# Patient Record
Sex: MALE | Race: WHITE | NOT HISPANIC OR LATINO | Employment: OTHER | URBAN - NONMETROPOLITAN AREA
[De-identification: names, ages, dates, MRNs, and addresses within clinical notes are randomized per-mention and may not be internally consistent; named-entity substitution may affect disease eponyms.]

---

## 2017-11-30 ENCOUNTER — OFFICE VISIT (OUTPATIENT)
Dept: CARDIOLOGY | Facility: CLINIC | Age: 57
End: 2017-11-30

## 2017-11-30 VITALS
OXYGEN SATURATION: 98 % | HEART RATE: 71 BPM | HEIGHT: 74 IN | SYSTOLIC BLOOD PRESSURE: 130 MMHG | WEIGHT: 193 LBS | BODY MASS INDEX: 24.77 KG/M2 | DIASTOLIC BLOOD PRESSURE: 80 MMHG

## 2017-11-30 DIAGNOSIS — R07.2 PRECORDIAL PAIN: ICD-10-CM

## 2017-11-30 DIAGNOSIS — R06.09 DOE (DYSPNEA ON EXERTION): ICD-10-CM

## 2017-11-30 DIAGNOSIS — I48.0 PAROXYSMAL ATRIAL FIBRILLATION (HCC): Primary | ICD-10-CM

## 2017-11-30 DIAGNOSIS — M54.40 CHRONIC LOW BACK PAIN WITH SCIATICA, SCIATICA LATERALITY UNSPECIFIED, UNSPECIFIED BACK PAIN LATERALITY: ICD-10-CM

## 2017-11-30 DIAGNOSIS — K57.32 DIVERTICULITIS OF LARGE INTESTINE WITHOUT PERFORATION OR ABSCESS WITHOUT BLEEDING: ICD-10-CM

## 2017-11-30 DIAGNOSIS — G89.29 CHRONIC LOW BACK PAIN WITH SCIATICA, SCIATICA LATERALITY UNSPECIFIED, UNSPECIFIED BACK PAIN LATERALITY: ICD-10-CM

## 2017-11-30 PROCEDURE — 93000 ELECTROCARDIOGRAM COMPLETE: CPT | Performed by: INTERNAL MEDICINE

## 2017-11-30 PROCEDURE — 99204 OFFICE O/P NEW MOD 45 MIN: CPT | Performed by: INTERNAL MEDICINE

## 2017-11-30 RX ORDER — FAMOTIDINE 20 MG/1
20 TABLET, FILM COATED ORAL DAILY
COMMUNITY
End: 2021-03-11

## 2017-11-30 RX ORDER — ASPIRIN 81 MG/1
81 TABLET ORAL DAILY
COMMUNITY
End: 2021-07-05 | Stop reason: ALTCHOICE

## 2017-11-30 RX ORDER — LATANOPROST 50 UG/ML
1 SOLUTION/ DROPS OPHTHALMIC NIGHTLY
COMMUNITY

## 2017-11-30 RX ORDER — GABAPENTIN 300 MG/1
300 CAPSULE ORAL 2 TIMES DAILY
COMMUNITY
End: 2021-03-11

## 2017-11-30 RX ORDER — DORZOLAMIDE HYDROCHLORIDE AND TIMOLOL MALEATE 20; 5 MG/ML; MG/ML
1 SOLUTION/ DROPS OPHTHALMIC EVERY MORNING
COMMUNITY

## 2017-11-30 RX ORDER — LORAZEPAM 1 MG/1
0.5 TABLET ORAL NIGHTLY
COMMUNITY
End: 2021-03-11

## 2017-11-30 NOTE — PROGRESS NOTES
Viktor Mendez  [id number]  1960  57 y.o.  male    Referring Provider: Kaz Galindo DO    Reason for Follow-up Visit:  Initial visit for evaluation for Paroxysmal atrial fibrillation   Had periop Paroxysmal atrial fibrillation during abdominal surgery in [place] for diverticulitis and colostomy  Subjective    Overall feeling well   Chest pain with exertion, moderate substernal, pressure like. Lasts less than 1 minute   Non exertional chest pain at times and and at other times with exertion, he this this is due to gas  No diaphoresis  No nausea  No radiation  Precipitated with exertion  Moderate associated dyspnea    Moderate exertional shortness of breath on exertion relieved with rest  No significant cough or wheezing  Going on for several months  No palpitations  No associated chest pain  No significant pedal edema  No fever or chills  No significant expectoration  No hemoptysis  No presyncope or syncope  No significant pedal edema  Compliant with medications and dietary advice  Poor  effort tolerance  No presyncope or syncope  Compliant with medications          History of present illness:  Viktor Mendez is a 57 y.o. yo male with history of Paroxysmal atrial fibrillation who presents today for   Chief Complaint   Patient presents with   • Atrial Fibrillation     NEW    .    History  Past Medical History:   Diagnosis Date   • Atrial fibrillation    • DDD (degenerative disc disease), lumbar    • Hepatitis C    • Kidney cysts    ,   Past Surgical History:   Procedure Laterality Date   • COLONOSCOPY     • EYE SURGERY     ,   Family History   Problem Relation Age of Onset   • Diabetes Mother    • No Known Problems Father    ,   Social History   Substance Use Topics   • Smoking status: Former Smoker     Packs/day: 2.00     Years: 30.00     Types: Cigarettes   • Smokeless tobacco: None      Comment: QUIT 10 YEARS AGO    • Alcohol use No   ,     Medications  Current Outpatient Prescriptions   Medication Sig  "Dispense Refill   • aspirin 325 MG tablet Take 325 mg by mouth Daily.     • diltiaZEM (CARDIZEM) 30 MG tablet Take 30 mg by mouth 2 (Two) Times a Day.  0   • dorzolamide-timolol (COSOPT) 22.3-6.8 MG/ML ophthalmic solution 1 drop Every Morning.     • famotidine (PEPCID) 20 MG tablet Take 20 mg by mouth Daily.     • gabapentin (NEURONTIN) 300 MG capsule Take 300 mg by mouth 2 (Two) Times a Day.     • Lactobacillus (ACIDOPHILUS PO) Take 15 mg by mouth Daily.     • latanoprost (XALATAN) 0.005 % ophthalmic solution 1 drop Every Night.     • Epavvc-Dedgm-Ohqn-B12-Liver (LIVERITE PO) Take  by mouth Daily.     • LORazepam (ATIVAN) 1 MG tablet Take 0.5 mg by mouth Every Night.     • Multiple Vitamins-Minerals (MULTIVITAL PO) Take  by mouth.     • Omega-3 Fatty Acids (FISH OIL PO) Take  by mouth Daily.       No current facility-administered medications for this visit.        Allergies:  Review of patient's allergies indicates no known allergies.    Review of Systems  Review of Systems   Constitution: Negative.   HENT: Negative.    Eyes: Negative.    Cardiovascular: Positive for dyspnea on exertion and palpitations. Negative for chest pain, claudication, cyanosis, irregular heartbeat, leg swelling, near-syncope, orthopnea, paroxysmal nocturnal dyspnea and syncope.   Respiratory: Negative.    Endocrine: Negative.    Hematologic/Lymphatic: Negative.    Skin: Negative.    Musculoskeletal: Positive for arthritis and back pain.   Gastrointestinal: Negative for anorexia.   Genitourinary: Negative.    Neurological: Negative.    Psychiatric/Behavioral: Negative.        Objective     Physical Exam:  /80  Pulse 71  Ht 74\" (188 cm)  Wt 193 lb (87.5 kg)  SpO2 98%  BMI 24.78 kg/m2  Physical Exam   Constitutional: He appears well-developed.   HENT:   Head: Normocephalic.   Neck: Normal carotid pulses and no JVD present. No tracheal tenderness present. Carotid bruit is not present. No tracheal deviation and no edema present. "   Cardiovascular: Regular rhythm and normal pulses.    Murmur heard.   Systolic murmur is present with a grade of 2/6   Pulmonary/Chest: Effort normal. No stridor.   Abdominal: Soft.   Neurological: He is alert. He has normal strength. No cranial nerve deficit or sensory deficit.   Skin: Skin is warm.   Psychiatric: He has a normal mood and affect. His speech is normal and behavior is normal.       Results Review:       ECG 12 Lead  Date/Time: 11/30/2017 9:34 AM  Performed by: HUNTER MENDEZ  Authorized by: HUNTER MENDEZ   Comparison: not compared with previous ECG   Rhythm: sinus rhythm  Rate: normal  ST Segments: ST segments normal  T Waves: T waves normal  QRS axis: normal  Other: no other findings  Clinical impression: normal ECG            Assessment/Plan   Viktor was seen today for atrial fibrillation.    Diagnoses and all orders for this visit:    Paroxysmal atrial fibrillation  -     CBC & Differential; Future  -     Comprehensive Metabolic Panel; Future  -     TSH; Future  -     Holter Monitor - 24 Hour; Future    Chronic low back pain with sciatica, sciatica laterality unspecified, unspecified back pain laterality    Diverticulitis of large intestine without perforation or abscess without bleeding    CHAKRABORTY (dyspnea on exertion)  -     BNP; Future  -     Lipid Panel; Future    Precordial pain  -     Stress Test With Myocardial Perfusion One Day; Future    Other orders  -     ECG 12 Lead         No significant pedal edema. Compliant with medications and diet. Latest labs and medications reviewed.    Plan:    CBC CMP BNP TSH LIPID PANEL  Lexiscan stress test, unable to walk or run on treadmill with fall risk   Holter  Patient is asked to monitor BP at home or work, several times per month and return with written values at next office visit.   Get records from Cardiologist from [place]  Low salt cardiac diet.    Relevant printed educational materials given pertinent to above diagnoses    Close follow up with you as  scheduled.  Intensive factor modifications.  See order list.    Counseled regarding disease appropriate diet, fluid, caffeine, stimulants and sodium intake as well as importance of compliance to diet, exercise and regular follow up.  Avoid NSAIDS and COX2 inhibitors. Use Acetaminophen PRN.  Orders Placed This Encounter   Procedures   • Comprehensive Metabolic Panel     Standing Status:   Future     Standing Expiration Date:   11/30/2018   • BNP     Standing Status:   Future     Standing Expiration Date:   11/30/2018   • TSH     Standing Status:   Future     Standing Expiration Date:   11/30/2018   • Lipid Panel     Standing Status:   Future     Standing Expiration Date:   11/30/2018   • Stress Test With Myocardial Perfusion One Day     Standing Status:   Future     Standing Expiration Date:   11/30/2018     Order Specific Question:   What stress agent will be used?     Answer:   Regadenoson (Lexiscan)     Order Specific Question:   Difficulty walking criteria?     Answer:   Musculoskeletal (hips, knees, feet, back, amputee)     Order Specific Question:   Reason for exam?     Answer:   Chest Pain   • Holter Monitor - 24 Hour     Standing Status:   Future     Standing Expiration Date:   11/30/2018     Order Specific Question:   Reason for exam?     Answer:   AFib   • ECG 12 Lead     This order was created via procedure documentation   • CBC & Differential     Standing Status:   Future     Standing Expiration Date:   11/30/2018     Order Specific Question:   Manual Differential     Answer:   No       Return in about 6 weeks (around 1/11/2018).

## 2017-12-08 ENCOUNTER — APPOINTMENT (OUTPATIENT)
Dept: CARDIOLOGY | Facility: HOSPITAL | Age: 57
End: 2017-12-08
Attending: INTERNAL MEDICINE

## 2017-12-12 ENCOUNTER — OFFICE VISIT (OUTPATIENT)
Dept: SURGERY | Age: 57
End: 2017-12-12
Payer: MEDICAID

## 2017-12-12 VITALS
BODY MASS INDEX: 24.92 KG/M2 | DIASTOLIC BLOOD PRESSURE: 74 MMHG | HEIGHT: 74 IN | TEMPERATURE: 98 F | SYSTOLIC BLOOD PRESSURE: 130 MMHG | WEIGHT: 194.2 LBS

## 2017-12-12 DIAGNOSIS — Z43.3 ATTENTION TO COLOSTOMY (HCC): Primary | ICD-10-CM

## 2017-12-12 PROCEDURE — 99202 OFFICE O/P NEW SF 15 MIN: CPT | Performed by: SURGERY

## 2017-12-12 RX ORDER — LATANOPROST 50 UG/ML
SOLUTION/ DROPS OPHTHALMIC
COMMUNITY

## 2017-12-12 RX ORDER — LORAZEPAM 1 MG/1
0.5 TABLET ORAL
COMMUNITY

## 2017-12-12 RX ORDER — GABAPENTIN 300 MG/1
300 CAPSULE ORAL
COMMUNITY

## 2017-12-12 RX ORDER — YOHIMBE BARK 500 MG
CAPSULE ORAL
COMMUNITY

## 2017-12-12 RX ORDER — DORZOLAMIDE HYDROCHLORIDE AND TIMOLOL MALEATE 20; 5 MG/ML; MG/ML
SOLUTION/ DROPS OPHTHALMIC
COMMUNITY

## 2017-12-12 RX ORDER — ASPIRIN 325 MG
325 TABLET ORAL
COMMUNITY

## 2017-12-12 ASSESSMENT — ENCOUNTER SYMPTOMS
SHORTNESS OF BREATH: 0
SINUS PRESSURE: 0
COUGH: 0
NAUSEA: 0
DIARRHEA: 0
CHEST TIGHTNESS: 0
TROUBLE SWALLOWING: 0
CONSTIPATION: 0
WHEEZING: 0
ABDOMINAL PAIN: 0
BACK PAIN: 1
COLOR CHANGE: 0
ABDOMINAL DISTENTION: 0
SORE THROAT: 0
VOMITING: 0

## 2017-12-12 NOTE — PROGRESS NOTES
Bowel sounds are normal. He exhibits no distension and no mass. There is no tenderness. There is no guarding. His midline incision is well-healed and without evidence of a hernia. The colostomy stoma is pink and healthy. The sutures used to mature the colostomy remain in place. Assessment:      1) Continued satisfactory recovery post Iliana's procedure for diverticulitis. 2) Transient atrial fibrillation with rapid ventricular response, now resolved. I suspect this was probably related to the stress of his illness and surgery. Workup continues per Dr. Cinyd Gallagher. Plan:      1) I've encouraged Mr. Schaffer to continue with light to moderate activity. In another 3 weeks he may gradually return to normal activity with no restriction. 2) I've encouraged him to continue his follow-up with Dr. Cindy Gallagher for formal cardiology evaluation. 3) We will call his surgeons office in [place] to obtain a copy of the operative note  4) We spoke about colostomy reversal.  I told that we usually wait about 4-6 months before considering colostomy reversal and he's okay with that. He was told in [place] that the colostomy was done in such a way that it would be easy to reverse with a laparoscope or robot. I told him that I don't do the surgery that way but would be happy to refer him to someone in [place] or [place] who may be able to do so. He and his wife will think about that and let me know their decision at the next office visit. 5) Follow-up with me in 1 month.

## 2017-12-22 ENCOUNTER — HOSPITAL ENCOUNTER (OUTPATIENT)
Dept: CARDIOLOGY | Facility: HOSPITAL | Age: 57
Discharge: HOME | End: 2017-12-22
Attending: INTERNAL MEDICINE

## 2017-12-22 VITALS
HEART RATE: 51 BPM | BODY MASS INDEX: 24.38 KG/M2 | WEIGHT: 190 LBS | HEIGHT: 74 IN | SYSTOLIC BLOOD PRESSURE: 134 MMHG | DIASTOLIC BLOOD PRESSURE: 98 MMHG

## 2017-12-22 DIAGNOSIS — R07.2 PRECORDIAL PAIN: ICD-10-CM

## 2017-12-22 LAB
BH CV STRESS BP STAGE 1: NORMAL
BH CV STRESS COMMENTS STAGE 1: NORMAL
BH CV STRESS DOSE REGADENOSON STAGE 1: 0.4
BH CV STRESS DURATION MIN STAGE 1: 0
BH CV STRESS DURATION SEC STAGE 1: 10
BH CV STRESS HR STAGE 1: 99
BH CV STRESS PROTOCOL 1: NORMAL
BH CV STRESS RECOVERY BP: NORMAL MMHG
BH CV STRESS RECOVERY HR: 92 BPM
BH CV STRESS STAGE 1: 1
LV EF NUC BP: 65 %
MAXIMAL PREDICTED HEART RATE: 163 BPM
PERCENT MAX PREDICTED HR: 60.74 %
STRESS BASELINE BP: NORMAL MMHG
STRESS BASELINE HR: 51 BPM
STRESS PERCENT HR: 71 %
STRESS POST EXERCISE DUR SEC: 10 SEC
STRESS POST PEAK BP: NORMAL MMHG
STRESS POST PEAK HR: 99 BPM
STRESS TARGET HR: 139 BPM

## 2017-12-22 PROCEDURE — 78452 HT MUSCLE IMAGE SPECT MULT: CPT | Performed by: INTERNAL MEDICINE

## 2017-12-22 PROCEDURE — 93018 CV STRESS TEST I&R ONLY: CPT | Performed by: INTERNAL MEDICINE

## 2017-12-22 PROCEDURE — 93017 CV STRESS TEST TRACING ONLY: CPT

## 2017-12-22 PROCEDURE — 78452 HT MUSCLE IMAGE SPECT MULT: CPT

## 2017-12-22 PROCEDURE — A9500 TC99M SESTAMIBI: HCPCS | Performed by: INTERNAL MEDICINE

## 2017-12-22 PROCEDURE — 0 TECHNETIUM SESTAMIBI: Performed by: INTERNAL MEDICINE

## 2017-12-22 PROCEDURE — 25010000002 REGADENOSON 0.4 MG/5ML SOLUTION: Performed by: INTERNAL MEDICINE

## 2017-12-22 RX ADMIN — TECHNETIUM TC-99M SESTAMIBI 1 DOSE: 1 INJECTION INTRAVENOUS at 08:41

## 2017-12-22 RX ADMIN — TECHNETIUM TC-99M SESTAMIBI 1 DOSE: 1 INJECTION INTRAVENOUS at 10:22

## 2017-12-22 RX ADMIN — REGADENOSON 0.4 MG: 0.08 INJECTION, SOLUTION INTRAVENOUS at 10:14

## 2018-01-10 ENCOUNTER — TELEPHONE (OUTPATIENT)
Dept: CARDIOLOGY | Facility: CLINIC | Age: 58
End: 2018-01-10

## 2018-01-11 ENCOUNTER — TELEPHONE (OUTPATIENT)
Dept: CARDIOLOGY | Facility: CLINIC | Age: 58
End: 2018-01-11

## 2018-01-11 NOTE — TELEPHONE ENCOUNTER
PT CALLED HAD AN APPT FOR TOMORROW WAS CA DUE TO WEATHER.  PT WOULD LIKE TO KNOW LANCE RESULTS DONE 12/22/2017    PLEASES ADVISE

## 2018-01-23 ENCOUNTER — OFFICE VISIT (OUTPATIENT)
Dept: SURGERY | Age: 58
End: 2018-01-23
Payer: MEDICAID

## 2018-01-23 VITALS
TEMPERATURE: 98.3 F | DIASTOLIC BLOOD PRESSURE: 76 MMHG | WEIGHT: 200 LBS | SYSTOLIC BLOOD PRESSURE: 134 MMHG | HEIGHT: 74 IN | BODY MASS INDEX: 25.67 KG/M2

## 2018-01-23 DIAGNOSIS — Z43.3 ATTENTION TO COLOSTOMY (HCC): Primary | ICD-10-CM

## 2018-01-23 PROCEDURE — 99212 OFFICE O/P EST SF 10 MIN: CPT | Performed by: SURGERY

## 2018-03-13 ENCOUNTER — OFFICE VISIT (OUTPATIENT)
Dept: CARDIOLOGY | Facility: CLINIC | Age: 58
End: 2018-03-13

## 2018-03-13 VITALS
HEART RATE: 74 BPM | WEIGHT: 197 LBS | BODY MASS INDEX: 25.28 KG/M2 | SYSTOLIC BLOOD PRESSURE: 134 MMHG | OXYGEN SATURATION: 99 % | DIASTOLIC BLOOD PRESSURE: 86 MMHG | HEIGHT: 74 IN

## 2018-03-13 DIAGNOSIS — I47.1 SVT (SUPRAVENTRICULAR TACHYCARDIA) (HCC): ICD-10-CM

## 2018-03-13 DIAGNOSIS — M54.40 CHRONIC LOW BACK PAIN WITH SCIATICA, SCIATICA LATERALITY UNSPECIFIED, UNSPECIFIED BACK PAIN LATERALITY: ICD-10-CM

## 2018-03-13 DIAGNOSIS — I48.0 PAROXYSMAL ATRIAL FIBRILLATION (HCC): ICD-10-CM

## 2018-03-13 DIAGNOSIS — G89.29 CHRONIC LOW BACK PAIN WITH SCIATICA, SCIATICA LATERALITY UNSPECIFIED, UNSPECIFIED BACK PAIN LATERALITY: ICD-10-CM

## 2018-03-13 DIAGNOSIS — R06.09 DOE (DYSPNEA ON EXERTION): Primary | ICD-10-CM

## 2018-03-13 DIAGNOSIS — K57.32 DIVERTICULITIS OF LARGE INTESTINE WITHOUT PERFORATION OR ABSCESS WITHOUT BLEEDING: ICD-10-CM

## 2018-03-13 PROBLEM — R07.2 PRECORDIAL PAIN: Status: RESOLVED | Noted: 2017-11-30 | Resolved: 2018-03-13

## 2018-03-13 PROBLEM — I47.10 SVT (SUPRAVENTRICULAR TACHYCARDIA): Status: ACTIVE | Noted: 2018-03-13

## 2018-03-13 PROCEDURE — 93000 ELECTROCARDIOGRAM COMPLETE: CPT | Performed by: INTERNAL MEDICINE

## 2018-03-13 PROCEDURE — 99214 OFFICE O/P EST MOD 30 MIN: CPT | Performed by: INTERNAL MEDICINE

## 2018-03-13 NOTE — PROGRESS NOTES
Viktor Mendez  [id number]  1960  58 y.o.  male    Referring Provider: Kaz Galindo DO    Reason for Follow-up Visit:  Here for follow up after cardiac testing.   Low risk stress test with normal LVEF    Had periop Paroxysmal atrial fibrillation during abdominal surgery in [place] for diverticulitis and colostomy  Subjective    Mild chronic  exertional shortness of breath on exertion relieved with rest  No significant cough or wheezing  Going on for several months  No palpitations  No associated chest pain  No significant pedal edema  No fever or chills  No significant expectoration  No hemoptysis  No presyncope or syncope   Chronic low back pain    Feels tired   Arthritic pain in small joints           History of present illness:  Viktor Mendez is a 58 y.o. yo male with history of Paroxysmal atrial fibrillation who presents today for   Chief Complaint   Patient presents with   • Atrial Fibrillation     5 Month follow up    .    History  Past Medical History:   Diagnosis Date   • Atrial fibrillation    • DDD (degenerative disc disease), lumbar    • Hepatitis C    • Kidney cysts    ,   Past Surgical History:   Procedure Laterality Date   • COLONOSCOPY     • COLOSTOMY     • EYE SURGERY     ,   Family History   Problem Relation Age of Onset   • Diabetes Mother    • No Known Problems Father    ,   Social History   Substance Use Topics   • Smoking status: Former Smoker     Packs/day: 2.00     Years: 30.00     Types: Cigarettes   • Smokeless tobacco: Not on file      Comment: QUIT 10 YEARS AGO    • Alcohol use No   ,     Medications  Current Outpatient Prescriptions   Medication Sig Dispense Refill   • aspirin 325 MG tablet Take 325 mg by mouth Daily.     • diltiaZEM (CARDIZEM) 30 MG tablet Take 30 mg by mouth 2 (Two) Times a Day.  0   • dorzolamide-timolol (COSOPT) 22.3-6.8 MG/ML ophthalmic solution 1 drop Every Morning.     • famotidine (PEPCID) 20 MG tablet Take 20 mg by mouth Daily.     • gabapentin  (NEURONTIN) 300 MG capsule Take 300 mg by mouth 2 (Two) Times a Day.     • Lactobacillus (ACIDOPHILUS PO) Take 15 mg by mouth Daily.     • latanoprost (XALATAN) 0.005 % ophthalmic solution 1 drop Every Night.     • Fyfowy-Sftta-Ekfv-B12-Liver (LIVERITE PO) Take  by mouth Daily.     • LORazepam (ATIVAN) 1 MG tablet Take 0.5 mg by mouth Every Night.     • Multiple Vitamins-Minerals (MULTIVITAL PO) Take  by mouth.     • Omega-3 Fatty Acids (FISH OIL PO) Take  by mouth Daily.       No current facility-administered medications for this visit.    Holter  · Average HR: 74. Min HR: 55. Max HR: 120.     · The predominant rhythm noted during the testing period was sinus rhythm.  · Premature atrial and ventricular contractions occured rarely.  · Total ectopy burden during the monitoring period; PVCs:    9    and APCs:    91      · 5   supraventricular tachycardia episodes  longest  11     beats at maximum rate of  127      BPM      · No significant  ventricular tachy or mary arrhythmia.  · No correlated arrhythmia  · No significant pauses   myocardial perfusion scan    Low risk stress test with normal LVEF     Allergies:  Review of patient's allergies indicates no known allergies.    Review of Systems  Review of Systems   Constitution: Positive for weakness and malaise/fatigue.   HENT: Negative.    Eyes: Negative.    Cardiovascular: Positive for dyspnea on exertion. Negative for chest pain, claudication, cyanosis, irregular heartbeat, leg swelling, near-syncope, orthopnea, palpitations, paroxysmal nocturnal dyspnea and syncope.   Respiratory: Negative.    Endocrine: Negative.    Hematologic/Lymphatic: Negative.    Skin: Negative.    Musculoskeletal: Positive for arthritis and back pain.   Gastrointestinal: Negative for anorexia.   Genitourinary: Negative.    Psychiatric/Behavioral: Negative.        Objective     Physical Exam:  /86 (BP Location: Left arm, Patient Position: Sitting, Cuff Size: Adult)   Pulse 74   Ht  "188 cm (74\")   Wt 89.4 kg (197 lb)   SpO2 99%   BMI 25.29 kg/m²   Physical Exam   Constitutional: He appears well-developed.   HENT:   Head: Normocephalic.   Neck: Normal carotid pulses and no JVD present. No tracheal tenderness present. Carotid bruit is not present. No tracheal deviation and no edema present.   Cardiovascular: Regular rhythm and normal pulses.    Murmur heard.   Systolic murmur is present with a grade of 2/6   Pulmonary/Chest: Effort normal. No stridor.   Abdominal: Soft.   Neurological: He is alert. He has normal strength. No cranial nerve deficit or sensory deficit.   Skin: Skin is warm.   Psychiatric: He has a normal mood and affect. His speech is normal and behavior is normal.       Results Review:       ECG 12 Lead  Date/Time: 3/13/2018 10:12 AM  Performed by: HUNTER MENDEZ  Authorized by: HUNTER MENDEZ   Comparison: compared with previous ECG from 11/30/2017  Similar to previous ECG  Rhythm: sinus rhythm  Rate: normal  Conduction: conduction normal  ST Segments: ST segments normal  QRS axis: normal  Other: no other findings  Clinical impression: normal ECG            Assessment/Plan   Viktor was seen today for atrial fibrillation.    Diagnoses and all orders for this visit:    CHAKRABORTY (dyspnea on exertion)    Chronic low back pain with sciatica, sciatica laterality unspecified, unspecified back pain laterality    Paroxysmal atrial fibrillation    Diverticulitis of large intestine without perforation or abscess without bleeding    SVT (supraventricular tachycardia)    Other orders  -     ECG 12 Lead         No significant pedal edema. Compliant with medications and diet. Latest labs and medications reviewed.    Plan:    Acceptable cardiovascular risk of repeat colonoscopy surgery.  Can proceed with surgery with usual caution and perioperative hemodynamic and cardiac rhythm monitoring.   Monitor for signs of volume overload and maintain an euvolemic status.   The patient is advised to reduce or " "avoid caffeine or other cardiac stimulants.    Patient is asked to monitor BP at home or work, several times per month and return with written values at next office visit.   Low salt/ HTN/ Heart healthy carbohydrate restricted cardiac diet as applicable to this patient's current diagnoses.   This handout has relevant information regarding shopping for food, preparing meals, what to eat at restaurants, tracking of food intake, information regarding sodium intake and salt content, how to read food labels, knowing what to eat, tips reagarding physical activity, calorie count and calorie expenditure. What foods to avoid. Information regarding alcoholic drinks along with \"good\" and \"bad\" fats.  Relevant printed educational materials given pertinent to above diagnoses      Close follow up with you as scheduled.  Intensive factor modifications.  See order list.    Counseled regarding disease appropriate diet, fluid, caffeine, stimulants and sodium intake as well as importance of compliance to diet, exercise and regular follow up.  Avoid NSAIDS and COX2 inhibitors. Use Acetaminophen PRN.  The patient was advised that NSAID-type medications have three  very important potential side effects: cardiovascular complications, gastrointestinal irritation including hemorrhage and renal injuries.  Do not use anti-inflammatories such as Motrin/ibuprofen, Alleve/naprosyn, Mobic and like medications Use Tylenol instead.The patient expresses understanding of these issues and questions were answered.   monitor for any signs of bleeding including red or dark stools. Fall precautions.    Gave a copy of my notes and relevant tests/ prior ECG etc for the patient to review and follow specific advise and relevant findings if any, prognosis, along with my current and future plans.     Return in about 6 months (around 9/13/2018).                "

## 2018-04-03 ENCOUNTER — OFFICE VISIT (OUTPATIENT)
Dept: SURGERY | Age: 58
End: 2018-04-03
Payer: MEDICAID

## 2018-04-03 VITALS
BODY MASS INDEX: 25.41 KG/M2 | WEIGHT: 198 LBS | HEIGHT: 74 IN | DIASTOLIC BLOOD PRESSURE: 74 MMHG | TEMPERATURE: 98.6 F | SYSTOLIC BLOOD PRESSURE: 120 MMHG

## 2018-04-03 DIAGNOSIS — Z43.3 ATTENTION TO COLOSTOMY (HCC): Primary | ICD-10-CM

## 2018-04-03 PROCEDURE — 99212 OFFICE O/P EST SF 10 MIN: CPT | Performed by: SURGERY

## 2018-09-14 ENCOUNTER — LAB (OUTPATIENT)
Dept: LAB | Facility: HOSPITAL | Age: 58
End: 2018-09-14
Attending: INTERNAL MEDICINE

## 2018-09-14 ENCOUNTER — OFFICE VISIT (OUTPATIENT)
Dept: CARDIOLOGY | Facility: CLINIC | Age: 58
End: 2018-09-14

## 2018-09-14 VITALS
OXYGEN SATURATION: 99 % | HEIGHT: 74 IN | DIASTOLIC BLOOD PRESSURE: 84 MMHG | HEART RATE: 55 BPM | SYSTOLIC BLOOD PRESSURE: 142 MMHG | WEIGHT: 199.8 LBS | BODY MASS INDEX: 25.64 KG/M2

## 2018-09-14 DIAGNOSIS — I47.1 SVT (SUPRAVENTRICULAR TACHYCARDIA) (HCC): Primary | ICD-10-CM

## 2018-09-14 DIAGNOSIS — I48.0 PAROXYSMAL ATRIAL FIBRILLATION (HCC): ICD-10-CM

## 2018-09-14 DIAGNOSIS — R00.1 SINUS BRADYCARDIA: ICD-10-CM

## 2018-09-14 DIAGNOSIS — M54.40 CHRONIC LOW BACK PAIN WITH SCIATICA, SCIATICA LATERALITY UNSPECIFIED, UNSPECIFIED BACK PAIN LATERALITY: ICD-10-CM

## 2018-09-14 DIAGNOSIS — R06.09 DOE (DYSPNEA ON EXERTION): ICD-10-CM

## 2018-09-14 DIAGNOSIS — K57.32 DIVERTICULITIS OF LARGE INTESTINE WITHOUT PERFORATION OR ABSCESS WITHOUT BLEEDING: ICD-10-CM

## 2018-09-14 DIAGNOSIS — G89.29 CHRONIC LOW BACK PAIN WITH SCIATICA, SCIATICA LATERALITY UNSPECIFIED, UNSPECIFIED BACK PAIN LATERALITY: ICD-10-CM

## 2018-09-14 LAB
ALBUMIN SERPL-MCNC: 4.6 G/DL (ref 3.5–5)
ALBUMIN/GLOB SERPL: 1.4 G/DL (ref 1.1–2.5)
ALP SERPL-CCNC: 69 U/L (ref 24–120)
ALT SERPL W P-5'-P-CCNC: 25 U/L (ref 0–54)
ANION GAP SERPL CALCULATED.3IONS-SCNC: 10 MMOL/L (ref 4–13)
ARTICHOKE IGE QN: 34 MG/DL (ref 0–99)
AST SERPL-CCNC: 26 U/L (ref 7–45)
BASOPHILS # BLD AUTO: 0.03 10*3/MM3 (ref 0–0.2)
BASOPHILS NFR BLD AUTO: 0.5 % (ref 0–2)
BILIRUB SERPL-MCNC: 1.2 MG/DL (ref 0.1–1)
BUN BLD-MCNC: 15 MG/DL (ref 5–21)
BUN/CREAT SERPL: 16 (ref 7–25)
CALCIUM SPEC-SCNC: 9.4 MG/DL (ref 8.4–10.4)
CHLORIDE SERPL-SCNC: 107 MMOL/L (ref 98–110)
CHOLEST SERPL-MCNC: 161 MG/DL (ref 130–200)
CO2 SERPL-SCNC: 29 MMOL/L (ref 24–31)
CREAT BLD-MCNC: 0.94 MG/DL (ref 0.5–1.4)
DEPRECATED RDW RBC AUTO: 41.9 FL (ref 40–54)
EOSINOPHIL # BLD AUTO: 0.15 10*3/MM3 (ref 0–0.7)
EOSINOPHIL NFR BLD AUTO: 2.4 % (ref 0–4)
ERYTHROCYTE [DISTWIDTH] IN BLOOD BY AUTOMATED COUNT: 13.3 % (ref 12–15)
GFR SERPL CREATININE-BSD FRML MDRD: 82 ML/MIN/1.73
GLOBULIN UR ELPH-MCNC: 3.2 GM/DL
GLUCOSE BLD-MCNC: 109 MG/DL (ref 70–100)
HCT VFR BLD AUTO: 35.9 % (ref 40–52)
HDLC SERPL-MCNC: 25 MG/DL
HGB BLD-MCNC: 12.9 G/DL (ref 14–18)
IMM GRANULOCYTES # BLD: 0.02 10*3/MM3 (ref 0–0.03)
IMM GRANULOCYTES NFR BLD: 0.3 % (ref 0–5)
LDLC/HDLC SERPL: ABNORMAL {RATIO}
LYMPHOCYTES # BLD AUTO: 2.42 10*3/MM3 (ref 0.72–4.86)
LYMPHOCYTES NFR BLD AUTO: 39 % (ref 15–45)
MCH RBC QN AUTO: 31.2 PG (ref 28–32)
MCHC RBC AUTO-ENTMCNC: 35.9 G/DL (ref 33–36)
MCV RBC AUTO: 86.9 FL (ref 82–95)
MONOCYTES # BLD AUTO: 0.55 10*3/MM3 (ref 0.19–1.3)
MONOCYTES NFR BLD AUTO: 8.9 % (ref 4–12)
NEUTROPHILS # BLD AUTO: 3.04 10*3/MM3 (ref 1.87–8.4)
NEUTROPHILS NFR BLD AUTO: 48.9 % (ref 39–78)
NRBC BLD MANUAL-RTO: 0 /100 WBC (ref 0–0)
PLATELET # BLD AUTO: 170 10*3/MM3 (ref 130–400)
PMV BLD AUTO: 10.5 FL (ref 6–12)
POTASSIUM BLD-SCNC: 4.5 MMOL/L (ref 3.5–5.3)
PROT SERPL-MCNC: 7.8 G/DL (ref 6.3–8.7)
RBC # BLD AUTO: 4.13 10*6/MM3 (ref 4.8–5.9)
SODIUM BLD-SCNC: 146 MMOL/L (ref 135–145)
TRIGL SERPL-MCNC: 483 MG/DL (ref 0–149)
TSH SERPL DL<=0.05 MIU/L-ACNC: 1.6 MIU/ML (ref 0.47–4.68)
WBC NRBC COR # BLD: 6.21 10*3/MM3 (ref 4.8–10.8)

## 2018-09-14 PROCEDURE — 80061 LIPID PANEL: CPT | Performed by: INTERNAL MEDICINE

## 2018-09-14 PROCEDURE — 93000 ELECTROCARDIOGRAM COMPLETE: CPT | Performed by: INTERNAL MEDICINE

## 2018-09-14 PROCEDURE — 80053 COMPREHEN METABOLIC PANEL: CPT | Performed by: INTERNAL MEDICINE

## 2018-09-14 PROCEDURE — 85025 COMPLETE CBC W/AUTO DIFF WBC: CPT | Performed by: INTERNAL MEDICINE

## 2018-09-14 PROCEDURE — 99214 OFFICE O/P EST MOD 30 MIN: CPT | Performed by: INTERNAL MEDICINE

## 2018-09-14 PROCEDURE — 84443 ASSAY THYROID STIM HORMONE: CPT | Performed by: INTERNAL MEDICINE

## 2018-09-14 PROCEDURE — 36415 COLL VENOUS BLD VENIPUNCTURE: CPT

## 2018-09-14 NOTE — PROGRESS NOTES
Viktor Mendez  [id number]  1960  58 y.o.  male    Referring Provider: Kaz Galindo DO    Reason for Follow-up Visit:   Routine follow up.   Low risk stress test with normal LVEF    Had periop Paroxysmal atrial fibrillation during abdominal surgery in [place] for diverticulitis and colostomy      Subjective      Similar symptoms as during last visit  Mild chronic  exertional shortness of breath on exertion relieved with rest  No significant cough or wheezing  Going on for several months  No palpitations  No associated chest pain  No significant pedal edema  No fever or chills  No significant expectoration  No hemoptysis  No presyncope or syncope   Chronic low back pain    Feels tired   Arthritic pain in small joints           History of present illness:  Viktor Mendez is a 58 y.o. yo male with history of Paroxysmal atrial fibrillation who presents today for   Chief Complaint   Patient presents with   • Atrial Fibrillation     6 Month follow up    .    History  Past Medical History:   Diagnosis Date   • Atrial fibrillation (CMS/HCC)    • DDD (degenerative disc disease), lumbar    • Hepatitis C    • Kidney cysts    ,   Past Surgical History:   Procedure Laterality Date   • COLONOSCOPY     • COLOSTOMY     • EYE SURGERY     ,   Family History   Problem Relation Age of Onset   • Diabetes Mother    • No Known Problems Father    ,   Social History   Substance Use Topics   • Smoking status: Former Smoker     Packs/day: 2.00     Years: 30.00     Types: Cigarettes   • Smokeless tobacco: Never Used      Comment: QUIT 10 YEARS AGO    • Alcohol use No   ,     Medications  Current Outpatient Prescriptions   Medication Sig Dispense Refill   • dorzolamide-timolol (COSOPT) 22.3-6.8 MG/ML ophthalmic solution 1 drop Every Morning.     • famotidine (PEPCID) 20 MG tablet Take 20 mg by mouth Daily.     • gabapentin (NEURONTIN) 300 MG capsule Take 300 mg by mouth 2 (Two) Times a Day.     • Lactobacillus (ACIDOPHILUS PO) Take  "15 mg by mouth Daily.     • latanoprost (XALATAN) 0.005 % ophthalmic solution 1 drop Every Night.     • Multiple Vitamins-Minerals (MULTIVITAL PO) Take  by mouth.     • aspirin 325 MG tablet Take 325 mg by mouth Daily.     • Vjtrvy-Hzrry-Phmc-B12-Liver (LIVERITE PO) Take  by mouth Daily.     • LORazepam (ATIVAN) 1 MG tablet Take 0.5 mg by mouth Every Night.     • Omega-3 Fatty Acids (FISH OIL PO) Take  by mouth Daily.       No current facility-administered medications for this visit.    Holter  · Average HR: 74. Min HR: 55. Max HR: 120.     · The predominant rhythm noted during the testing period was sinus rhythm.  · Premature atrial and ventricular contractions occured rarely.  · Total ectopy burden during the monitoring period; PVCs:    9    and APCs:    91      · 5   supraventricular tachycardia episodes  longest  11     beats at maximum rate of  127      BPM      · No significant  ventricular tachy or mary arrhythmia.  · No correlated arrhythmia  · No significant pauses   myocardial perfusion scan    Low risk stress test with normal LVEF     Allergies:  Patient has no known allergies.    Review of Systems  Review of Systems   Constitution: Positive for weakness and malaise/fatigue.   HENT: Negative.    Eyes: Negative.    Cardiovascular: Positive for dyspnea on exertion. Negative for chest pain, claudication, cyanosis, irregular heartbeat, leg swelling, near-syncope, orthopnea, palpitations, paroxysmal nocturnal dyspnea and syncope.   Respiratory: Negative.    Endocrine: Negative.    Hematologic/Lymphatic: Negative.    Skin: Negative.    Musculoskeletal: Positive for arthritis and back pain.   Gastrointestinal: Negative for anorexia.   Genitourinary: Negative.    Psychiatric/Behavioral: Negative.        Objective     Physical Exam:  /84 (BP Location: Left arm, Patient Position: Sitting, Cuff Size: Adult)   Pulse 55   Ht 188 cm (74\")   Wt 90.6 kg (199 lb 12.8 oz)   SpO2 99%   BMI 25.65 kg/m²   Physical " Exam   Constitutional: He appears well-developed.   HENT:   Head: Normocephalic.   Neck: Normal carotid pulses and no JVD present. No tracheal tenderness present. Carotid bruit is not present. No tracheal deviation and no edema present.   Cardiovascular: Regular rhythm and normal pulses.    Murmur heard.   Systolic murmur is present with a grade of 2/6   Pulmonary/Chest: Effort normal. No stridor.   Abdominal: Soft.   Neurological: He is alert. He has normal strength. No cranial nerve deficit or sensory deficit.   Skin: Skin is warm.   Psychiatric: He has a normal mood and affect. His speech is normal and behavior is normal.       Results Review:       ECG 12 Lead  Date/Time: 9/14/2018 8:32 AM  Performed by: HUNTER MENDEZ  Authorized by: HUNTER MENDEZ   Comparison: compared with previous ECG from 3/13/2018  Comparison to previous ECG: Ventricular rate decreased from 66  to   49  beats per minute    Rhythm: sinus bradycardia  Rate: bradycardic  Conduction: conduction normal  ST Segments: ST segments normal  T Waves: T waves normal  QRS axis: normal  Other: no other findings  Clinical impression: abnormal ECG            Assessment/Plan   Viktor was seen today for atrial fibrillation.    Diagnoses and all orders for this visit:    SVT (supraventricular tachycardia) (CMS/HCC)    Paroxysmal atrial fibrillation (CMS/HCC)    Diverticulitis of large intestine without perforation or abscess without bleeding    CHAKRABORTY (dyspnea on exertion)  -     Lipid Panel; Future    Chronic low back pain with sciatica, sciatica laterality unspecified, unspecified back pain laterality    Sinus bradycardia  -     CBC & Differential; Future  -     Comprehensive Metabolic Panel; Future  -     TSH; Future    Other orders  -     ECG 12 Lead         No significant pedal edema. Compliant with medications and diet. Latest labs and medications reviewed.    Plan:    Low salt/ HTN/ Heart healthy carbohydrate restricted cardiac diet as applicable to this  "patient's current diagnoses.   This handout has relevant information regarding shopping for food, preparing meals, what to eat at restaurants, tracking of food intake, information regarding sodium intake and salt content, how to read food labels, knowing what to eat, tips reagarding physical activity, calorie count and calorie expenditure. What foods to avoid. Information regarding alcoholic drinks along with \"good\" and \"bad\" fats.  Reiterated prior recommendations     The patient is advised to reduce or avoid caffeine or other cardiac stimulants.     The patient was advised that NSAID-type medications have three  very important potential side effects: cardiovascular complications, gastrointestinal irritation including hemorrhage and renal injuries.  Do not use anti-inflammatories such as Motrin/ibuprofen, Alleve/naprosyn, Mobic and like medications Use Tylenol instead.The patient expresses understanding of these issues and questions were answered.   Monitor for any signs of bleeding including red or dark stools. Fall precautions.       Monitor for any signs of bleeding including red or dark stools. Fall precautions.   Patient is asked to monitor BP at home or work, several times per month and return with written values at next office visit.     Advised staying uptodate with immunizations per established standard guidelines.    Reviewed available prior notes, consults, prior visits, laboratory findings, radiology And cardiology relevant reports. Updated chart as applicable. I have reviewed the patient's medical history in detail and updated the computerized patient record as relevant.    Updated patient regarding any new or relevant abnormalities on review of records or any new findings on physical exam. Mentioned to patient about purpose of visit and desirable health short and long term goals and objectives.    Offered to give patient  a copy of my notes and relevant tests/ prior ECG etc for the patient to review " and follow specific advise and relevant findings if any, prognosis, along with my current and future plans.      Questions were encouraged, asked and answered to the patient's  understanding and satisfaction. Questions if any regarding current medications and side effects, need for refills and importance of compliance to medications stressed.    Reviewed available prior notes, consults, prior visits, laboratory findings, radiology and cardiology relevant reports. Updated chart as applicable. I have reviewed the patient's medical history in detail and updated the computerized patient record as relevant.    Updated patient regarding any new or relevant abnormalities on review of records or any new findings on physical exam. Mentioned to patient about purpose of visit and desirable health short and long term goals and objectives.    Monitor CBC, CMP, TSH (as indicated) and Lipid Panel by primary     Orders Placed This Encounter   Procedures   • Comprehensive Metabolic Panel     Standing Status:   Future     Standing Expiration Date:   9/14/2019   • TSH     Standing Status:   Future     Standing Expiration Date:   9/14/2019   • Lipid Panel     Standing Status:   Future     Standing Expiration Date:   9/14/2019   • ECG 12 Lead     This order was created via procedure documentation   • CBC & Differential     Standing Status:   Future     Standing Expiration Date:   9/14/2019     Order Specific Question:   Manual Differential     Answer:   No         He will talk to his opthalmologist for alternative eye drops due to bradycardia        Return in about 4 weeks (around 10/12/2018).

## 2019-08-28 ENCOUNTER — OFFICE VISIT (OUTPATIENT)
Dept: CARDIOLOGY | Facility: CLINIC | Age: 59
End: 2019-08-28

## 2019-08-28 VITALS
HEIGHT: 74 IN | SYSTOLIC BLOOD PRESSURE: 124 MMHG | HEART RATE: 60 BPM | BODY MASS INDEX: 26.18 KG/M2 | WEIGHT: 204 LBS | DIASTOLIC BLOOD PRESSURE: 82 MMHG | OXYGEN SATURATION: 99 %

## 2019-08-28 DIAGNOSIS — I48.0 PAROXYSMAL ATRIAL FIBRILLATION (HCC): ICD-10-CM

## 2019-08-28 DIAGNOSIS — G89.29 CHRONIC LOW BACK PAIN WITH SCIATICA, SCIATICA LATERALITY UNSPECIFIED, UNSPECIFIED BACK PAIN LATERALITY: ICD-10-CM

## 2019-08-28 DIAGNOSIS — I47.1 SVT (SUPRAVENTRICULAR TACHYCARDIA) (HCC): Primary | ICD-10-CM

## 2019-08-28 DIAGNOSIS — R00.1 SINUS BRADYCARDIA: ICD-10-CM

## 2019-08-28 DIAGNOSIS — R06.09 DOE (DYSPNEA ON EXERTION): ICD-10-CM

## 2019-08-28 DIAGNOSIS — M54.40 CHRONIC LOW BACK PAIN WITH SCIATICA, SCIATICA LATERALITY UNSPECIFIED, UNSPECIFIED BACK PAIN LATERALITY: ICD-10-CM

## 2019-08-28 PROCEDURE — 93000 ELECTROCARDIOGRAM COMPLETE: CPT | Performed by: INTERNAL MEDICINE

## 2019-08-28 PROCEDURE — 99213 OFFICE O/P EST LOW 20 MIN: CPT | Performed by: INTERNAL MEDICINE

## 2019-08-28 RX ORDER — BRIMONIDINE TARTRATE 0.15 %
DROPS OPHTHALMIC (EYE)
Refills: 3 | COMMUNITY
Start: 2019-05-28 | End: 2021-03-11

## 2019-08-28 NOTE — PROGRESS NOTES
Viktor Mendez  [id number]  1960  59 y.o.  male    Referring Provider: Kaz Galindo DO    Reason for Follow-up Visit:   Routine follow up.   Low risk stress test with normal LVEF    Had periop Paroxysmal atrial fibrillation during abdominal surgery in [place] for diverticulitis and colostomy  sinus bradycardia  now improved after changing eye drops      Subjective    No new events or complaints since last visit   Similar symptoms as during last visit     Mild chronic exertional shortness of breath on exertion relieved with rest  No significant cough or wheezing    No palpitations  No associated chest pain  No significant pedal edema    No fever or chills  No significant expectoration    No hemoptysis  No presyncope or syncope     Chronic low back pain    Feels tired   Arthritic pain in small joints           History of present illness:  Viktor Mendez is a 59 y.o. yo male with history of Paroxysmal atrial fibrillation who presents today for   Chief Complaint   Patient presents with   • Atrial Fibrillation     1 YR FU   • Shortness of Breath   .    History  Past Medical History:   Diagnosis Date   • Atrial fibrillation (CMS/HCC)    • DDD (degenerative disc disease), lumbar    • Hepatitis C    • Kidney cysts    ,   Past Surgical History:   Procedure Laterality Date   • COLONOSCOPY     • COLOSTOMY     • EYE SURGERY     ,   Family History   Problem Relation Age of Onset   • Diabetes Mother    ,   Social History     Tobacco Use   • Smoking status: Former Smoker     Packs/day: 2.00     Years: 30.00     Pack years: 60.00     Types: Cigarettes   • Smokeless tobacco: Never Used   • Tobacco comment: QUIT 10 YEARS AGO    Substance Use Topics   • Alcohol use: No   • Drug use: No   ,     Medications  Current Outpatient Medications   Medication Sig Dispense Refill   • aspirin 325 MG tablet Take 325 mg by mouth Daily.     • brimonidine (ALPHAGAN) 0.15 % ophthalmic solution INSTILL 1 DROP INTO EACH EYE TWICE DAILY  3    • dorzolamide-timolol (COSOPT) 22.3-6.8 MG/ML ophthalmic solution 1 drop Every Morning.     • latanoprost (XALATAN) 0.005 % ophthalmic solution 1 drop Every Night.     • Multiple Vitamins-Minerals (MULTIVITAL PO) Take  by mouth.     • famotidine (PEPCID) 20 MG tablet Take 20 mg by mouth Daily.     • gabapentin (NEURONTIN) 300 MG capsule Take 300 mg by mouth 2 (Two) Times a Day.     • Lactobacillus (ACIDOPHILUS PO) Take 15 mg by mouth Daily.     • Keyzcc-Vvfiq-Fgcf-B12-Liver (LIVERITE PO) Take  by mouth Daily.     • LORazepam (ATIVAN) 1 MG tablet Take 0.5 mg by mouth Every Night.     • Omega-3 Fatty Acids (FISH OIL PO) Take  by mouth Daily.       No current facility-administered medications for this visit.    Holter  · Average HR: 74. Min HR: 55. Max HR: 120.     · The predominant rhythm noted during the testing period was sinus rhythm.  · Premature atrial and ventricular contractions occured rarely.  · Total ectopy burden during the monitoring period; PVCs:    9    and APCs:    91      · 5   supraventricular tachycardia episodes  longest  11     beats at maximum rate of  127      BPM      · No significant  ventricular tachy or mary arrhythmia.  · No correlated arrhythmia  · No significant pauses   myocardial perfusion scan    Low risk stress test with normal LVEF     Allergies:  Patient has no known allergies.    Review of Systems  Review of Systems   Constitution: Negative for weakness and malaise/fatigue.   HENT: Negative.    Eyes: Negative.    Cardiovascular: Positive for dyspnea on exertion. Negative for chest pain, claudication, cyanosis, irregular heartbeat, leg swelling, near-syncope, orthopnea, palpitations, paroxysmal nocturnal dyspnea and syncope.   Respiratory: Negative.    Endocrine: Negative.    Hematologic/Lymphatic: Negative.    Skin: Negative.    Musculoskeletal: Positive for arthritis and back pain.   Gastrointestinal: Negative for anorexia.   Genitourinary: Negative.    Psychiatric/Behavioral:  "Negative.        Objective     Physical Exam:  /82   Pulse 60   Ht 188 cm (74.02\")   Wt 92.5 kg (204 lb)   SpO2 99%   BMI 26.18 kg/m²   Physical Exam   Constitutional: He appears well-developed.   HENT:   Head: Normocephalic.   Neck: Normal carotid pulses and no JVD present. No tracheal tenderness present. Carotid bruit is not present. No tracheal deviation and no edema present.   Cardiovascular: Regular rhythm and normal pulses.   Murmur heard.   Systolic murmur is present with a grade of 2/6.  Pulmonary/Chest: Effort normal. No stridor.   Abdominal: Soft. He exhibits no distension. There is no tenderness. There is no rigidity.   Neurological: He is alert. He has normal strength. No cranial nerve deficit or sensory deficit.   Skin: Skin is warm.   Psychiatric: He has a normal mood and affect. His speech is normal and behavior is normal.       Results Review:  myocardial perfusion scan  2017    · Myocardial perfusion imaging indicates a normal myocardial perfusion study with no evidence of ischemia.  · Left ventricular ejection fraction is normal (Calculated EF = 65%).  · Diaphragmatic attenuation and GI artifacts are present.  · Raw images reviewed with the following abnormalities noted: vertical motion artifact.  · Impressions are consistent with a low risk study.     Holter 2017    · Average HR: 74. Min HR: 55. Max HR: 120.     · The predominant rhythm noted during the testing period was sinus rhythm.  · Premature atrial and ventricular contractions occured rarely.  · Total ectopy burden during the monitoring period; PVCs:    9    and APCs:    91      · 5   supraventricular tachycardia episodes  longest  11     beats at maximum rate of  127      BPM      · No significant  ventricular tachy or mary arrhythmia.  · No correlated arrhythmia  · No significant pauses          ECG 12 Lead  Date/Time: 8/28/2019 9:53 AM  Performed by: Carlito Barton MD  Authorized by: Carlito Barton MD   Comparison: compared " with previous ECG from 9/14/2018  Comparison to previous ECG: Ventricular rate increased from  49  to  57  beats per minute   Rhythm: sinus bradycardia  Rate: bradycardic  Conduction: conduction normal  ST Segments: ST segments normal  T Waves: T waves normal  QRS axis: normal  Other: no other findings    Clinical impression: abnormal EKG            Assessment/Plan   Viktor was seen today for atrial fibrillation and shortness of breath.    Diagnoses and all orders for this visit:    SVT (supraventricular tachycardia) (CMS/HCC)    Sinus bradycardia    CHAKRABORTY (dyspnea on exertion)    Chronic low back pain with sciatica, sciatica laterality unspecified, unspecified back pain laterality    Paroxysmal atrial fibrillation (CMS/HCC)    Other orders  -     ECG 12 Lead        Plan        ____________________________________________________________________________________________________________________________________________  Health maintenance and recommendations      Similar recommendations as last visit       Offered to give patient  a copy      Questions were encouraged, asked and answered to the patient's  understanding and satisfaction. Questions if any regarding current medications and side effects, need for refills and importance of compliance to medications stressed.    Reviewed available prior notes, consults, prior visits, laboratory findings, radiology and cardiology relevant reports. Updated chart as applicable. I have reviewed the patient's medical history in detail and updated the computerized patient record as relevant.      Updated patient regarding any new or relevant abnormalities on review of records or any new findings on physical exam. Mentioned to patient about purpose of visit and desirable health short and long term goals and objectives.    Primary to monitor CBC CMP Lipid panel and TSH as  applicable    ___________________________________________________________________________________________________________________________________________           Return in about 1 year (around 8/28/2020).

## 2020-08-28 ENCOUNTER — OFFICE VISIT (OUTPATIENT)
Dept: CARDIOLOGY | Facility: CLINIC | Age: 60
End: 2020-08-28

## 2020-08-28 VITALS
HEART RATE: 59 BPM | HEIGHT: 74 IN | BODY MASS INDEX: 25.93 KG/M2 | SYSTOLIC BLOOD PRESSURE: 140 MMHG | WEIGHT: 202 LBS | DIASTOLIC BLOOD PRESSURE: 80 MMHG

## 2020-08-28 DIAGNOSIS — G89.29 CHRONIC LOW BACK PAIN WITH SCIATICA, SCIATICA LATERALITY UNSPECIFIED, UNSPECIFIED BACK PAIN LATERALITY: ICD-10-CM

## 2020-08-28 DIAGNOSIS — I47.1 SVT (SUPRAVENTRICULAR TACHYCARDIA) (HCC): ICD-10-CM

## 2020-08-28 DIAGNOSIS — M54.40 CHRONIC LOW BACK PAIN WITH SCIATICA, SCIATICA LATERALITY UNSPECIFIED, UNSPECIFIED BACK PAIN LATERALITY: ICD-10-CM

## 2020-08-28 DIAGNOSIS — R06.09 DOE (DYSPNEA ON EXERTION): ICD-10-CM

## 2020-08-28 DIAGNOSIS — I48.0 PAROXYSMAL ATRIAL FIBRILLATION (HCC): ICD-10-CM

## 2020-08-28 DIAGNOSIS — R00.1 SINUS BRADYCARDIA: Primary | ICD-10-CM

## 2020-08-28 DIAGNOSIS — K57.32 DIVERTICULITIS OF LARGE INTESTINE WITHOUT PERFORATION OR ABSCESS WITHOUT BLEEDING: ICD-10-CM

## 2020-08-28 PROCEDURE — 93000 ELECTROCARDIOGRAM COMPLETE: CPT | Performed by: INTERNAL MEDICINE

## 2020-08-28 PROCEDURE — 99213 OFFICE O/P EST LOW 20 MIN: CPT | Performed by: INTERNAL MEDICINE

## 2020-08-28 NOTE — PROGRESS NOTES
Viktor Mendez  [id number]  1960  60 y.o.  male    Referring Provider: Kaz Galindo DO    Reason for Follow-up Visit:   Routine follow up.   Low risk stress test with normal LVEF    Had periop Paroxysmal atrial fibrillation during abdominal surgery in [place] for diverticulitis and colostomy  sinus bradycardia  now improved after changing eye drops      Subjective    Overall feels the same    No new events or complaints since last visit   Overall the patient feels no major change from baseline symptoms   Similar symptoms as during last visit     Tolerating current medications well with no untoward side effects   Compliant with prescribed medication regimen. Tries to adhere to cardiac diet.        Mild chronic exertional shortness of breath and feels he has to stop when he does something moderate exertion  relieved with rest  No significant cough or wheezing    No palpitations  No associated chest pain  No significant pedal edema    No fever or chills  No significant expectoration    No hemoptysis  No presyncope or syncope     Chronic low back pain    Feels tired   Arthritic pain in small joints     He feels more tired   Easy fatiguability   Has cataract      BP well controlled at home.     Was started on antihypertensives by Dr Galindo which was subsequently stopped      Has anxiety    History of present illness:  Viktor Mendez is a 60 y.o. yo male with history of paroxysmal atrial fibrillation who presents today for   Chief Complaint   Patient presents with   • Rapid Heart Rate     1 YR FU    .    History  Past Medical History:   Diagnosis Date   • Atrial fibrillation (CMS/HCC)    • DDD (degenerative disc disease), lumbar    • Hepatitis C    • Kidney cysts    ,   Past Surgical History:   Procedure Laterality Date   • COLONOSCOPY     • COLOSTOMY     • EYE SURGERY     ,   Family History   Problem Relation Age of Onset   • Diabetes Mother    ,   Social History     Tobacco Use   • Smoking status: Former  Smoker     Packs/day: 2.00     Years: 30.00     Pack years: 60.00     Types: Cigarettes   • Smokeless tobacco: Never Used   • Tobacco comment: QUIT 10 YEARS AGO    Substance Use Topics   • Alcohol use: No   • Drug use: No   ,     Medications  Current Outpatient Medications   Medication Sig Dispense Refill   • aspirin 325 MG tablet Take 81 mg by mouth Daily.     • dorzolamide-timolol (COSOPT) 22.3-6.8 MG/ML ophthalmic solution 1 drop Every Morning.     • latanoprost (XALATAN) 0.005 % ophthalmic solution 1 drop Every Night.     • Multiple Vitamins-Minerals (MULTIVITAL PO) Take  by mouth.     • brimonidine (ALPHAGAN) 0.15 % ophthalmic solution INSTILL 1 DROP INTO EACH EYE TWICE DAILY  3   • famotidine (PEPCID) 20 MG tablet Take 20 mg by mouth Daily.     • gabapentin (NEURONTIN) 300 MG capsule Take 300 mg by mouth 2 (Two) Times a Day.     • Lactobacillus (ACIDOPHILUS PO) Take 15 mg by mouth Daily.     • Xpwqbf-Hmhnx-Fpcf-B12-Liver (LIVERITE PO) Take  by mouth Daily.     • LORazepam (ATIVAN) 1 MG tablet Take 0.5 mg by mouth Every Night.     • Omega-3 Fatty Acids (FISH OIL PO) Take  by mouth Daily.       No current facility-administered medications for this visit.    Holter  · Average HR: 74. Min HR: 55. Max HR: 120.     · The predominant rhythm noted during the testing period was sinus rhythm.  · Premature atrial and ventricular contractions occured rarely.  · Total ectopy burden during the monitoring period; PVCs:    9    and APCs:    91      · 5   supraventricular tachycardia episodes  longest  11     beats at maximum rate of  127      BPM      · No significant  ventricular tachy or mary arrhythmia.  · No correlated arrhythmia  · No significant pauses   myocardial perfusion scan    Low risk stress test with normal LVEF     Allergies:  Patient has no known allergies.    Review of Systems  Review of Systems   Constitution: Positive for decreased appetite. Negative for malaise/fatigue.   HENT: Negative.    Eyes:  "Negative.    Cardiovascular: Positive for dyspnea on exertion. Negative for chest pain, claudication, cyanosis, irregular heartbeat, leg swelling, near-syncope, orthopnea, palpitations, paroxysmal nocturnal dyspnea and syncope.   Respiratory: Negative.    Endocrine: Negative.    Hematologic/Lymphatic: Negative.    Skin: Negative.    Musculoskeletal: Positive for arthritis and back pain.   Gastrointestinal: Negative for anorexia.   Genitourinary: Negative.    Neurological: Negative for weakness.   Psychiatric/Behavioral: Negative.        Objective     Physical Exam:  /80   Pulse 59   Ht 188 cm (74\")   Wt 91.6 kg (202 lb)   BMI 25.94 kg/m²   Physical Exam   Constitutional: He appears well-developed.   HENT:   Head: Normocephalic.   Neck: Normal carotid pulses and no JVD present. No tracheal tenderness present. Carotid bruit is not present. No tracheal deviation and no edema present.   Cardiovascular: Regular rhythm and normal pulses.   Murmur heard.   Systolic murmur is present with a grade of 2/6.  Pulmonary/Chest: Effort normal. No stridor.   Abdominal: Soft. He exhibits no distension. There is no tenderness. There is no rigidity.   Neurological: He is alert. He has normal strength. No cranial nerve deficit or sensory deficit.   Skin: Skin is warm.   Psychiatric: He has a normal mood and affect. His speech is normal and behavior is normal.       Results Review:  myocardial perfusion scan  2017    · Myocardial perfusion imaging indicates a normal myocardial perfusion study with no evidence of ischemia.  · Left ventricular ejection fraction is normal (Calculated EF = 65%).  · Diaphragmatic attenuation and GI artifacts are present.  · Raw images reviewed with the following abnormalities noted: vertical motion artifact.  · Impressions are consistent with a low risk study.     Holter 2017    · Average HR: 74. Min HR: 55. Max HR: 120.     · The predominant rhythm noted during the testing period was sinus " rhythm.  · Premature atrial and ventricular contractions occured rarely.  · Total ectopy burden during the monitoring period; PVCs:    9    and APCs:    91      · 5   supraventricular tachycardia episodes  longest  11     beats at maximum rate of  127      BPM      · No significant  ventricular tachy or mary arrhythmia.  · No correlated arrhythmia  · No significant pauses          ECG 12 Lead  Date/Time: 8/28/2020 8:23 AM  Performed by: Carlito Barton MD  Authorized by: Carlito Barton MD   Comparison: compared with previous ECG from 8/28/2019  Similar to previous ECG  Comparison to previous ECG: Ventricular rate increased from  57   to   59  beats per minute   Rhythm: sinus bradycardia  Rate: bradycardic  Conduction: conduction normal  ST Segments: ST segments normal  T Waves: T waves normal  QRS axis: normal  Other: no other findings    Clinical impression: normal ECG            Assessment/Plan   Viktor was seen today for rapid heart rate.    Diagnoses and all orders for this visit:    Sinus bradycardia    CHAKRABORTY (dyspnea on exertion)  -     Adult Transthoracic Echo Complete W/ Cont if Necessary Per Protocol; Future    Chronic low back pain with sciatica, sciatica laterality unspecified, unspecified back pain laterality    Paroxysmal atrial fibrillation (CMS/HCC)    SVT (supraventricular tachycardia) (CMS/HCC)    Diverticulitis of large intestine without perforation or abscess without bleeding    Other orders  -     ECG 12 Lead        ____________________________________________________________________________________________________________________________________________  Health maintenance and recommendations    Similar recommendations as last visit   Offered to give patient  a copy      Questions were encouraged, asked and answered to the patient's  understanding and satisfaction. Questions if any regarding current medications and side effects, need for refills and importance of compliance to medications  stressed.    Reviewed available prior notes, consults, prior visits, laboratory findings, radiology and cardiology relevant reports. Updated chart as applicable. I have reviewed the patient's medical history in detail and updated the computerized patient record as relevant.      Updated patient regarding any new or relevant abnormalities on review of records or any new findings on physical exam. Mentioned to patient about purpose of visit and desirable health short and long term goals and objectives.    Primary to monitor CBC CMP Lipid panel and TSH as applicable    ___________________________________________________________________________________________________________________________________________     Plan      Orders Placed This Encounter   Procedures   • ECG 12 Lead     This order was created via procedure documentation   • Adult Transthoracic Echo Complete W/ Cont if Necessary Per Protocol     Standing Status:   Future     Standing Expiration Date:   8/28/2021     Order Specific Question:   Reason for exam?     Answer:   Dyspnea        Discussed that Neurontin and Lorazepam can cause fatigue   Follow up with Dagmar TAMAYO to address interim issues            Return in about 6 months (around 2/28/2021).

## 2020-09-23 ENCOUNTER — HOSPITAL ENCOUNTER (OUTPATIENT)
Dept: CARDIOLOGY | Facility: HOSPITAL | Age: 60
Discharge: HOME | End: 2020-09-23
Admitting: INTERNAL MEDICINE

## 2020-09-23 VITALS
DIASTOLIC BLOOD PRESSURE: 80 MMHG | BODY MASS INDEX: 25.92 KG/M2 | SYSTOLIC BLOOD PRESSURE: 140 MMHG | WEIGHT: 201.94 LBS | HEIGHT: 74 IN

## 2020-09-23 DIAGNOSIS — R06.09 DOE (DYSPNEA ON EXERTION): ICD-10-CM

## 2020-09-23 PROCEDURE — 93356 MYOCRD STRAIN IMG SPCKL TRCK: CPT

## 2020-09-23 PROCEDURE — 93306 TTE W/DOPPLER COMPLETE: CPT

## 2020-09-23 PROCEDURE — 93356 MYOCRD STRAIN IMG SPCKL TRCK: CPT | Performed by: INTERNAL MEDICINE

## 2020-09-23 PROCEDURE — 93306 TTE W/DOPPLER COMPLETE: CPT | Performed by: INTERNAL MEDICINE

## 2020-09-25 LAB
BH CV ECHO MEAS - AO MAX PG (FULL): 3.7 MMHG
BH CV ECHO MEAS - AO MAX PG: 10 MMHG
BH CV ECHO MEAS - AO MEAN PG (FULL): 2 MMHG
BH CV ECHO MEAS - AO MEAN PG: 6 MMHG
BH CV ECHO MEAS - AO ROOT AREA (BSA CORRECTED): 1.7
BH CV ECHO MEAS - AO ROOT AREA: 10.5 CM^2
BH CV ECHO MEAS - AO ROOT DIAM: 3.7 CM
BH CV ECHO MEAS - AO V2 MAX: 158 CM/SEC
BH CV ECHO MEAS - AO V2 MEAN: 112 CM/SEC
BH CV ECHO MEAS - AO V2 VTI: 43.3 CM
BH CV ECHO MEAS - AVA(I,A): 3 CM^2
BH CV ECHO MEAS - AVA(I,D): 3 CM^2
BH CV ECHO MEAS - AVA(V,A): 3.6 CM^2
BH CV ECHO MEAS - AVA(V,D): 3.6 CM^2
BH CV ECHO MEAS - BSA(HAYCOCK): 2.2 M^2
BH CV ECHO MEAS - BSA: 2.2 M^2
BH CV ECHO MEAS - BZI_BMI: 25.8 KILOGRAMS/M^2
BH CV ECHO MEAS - BZI_METRIC_HEIGHT: 188 CM
BH CV ECHO MEAS - BZI_METRIC_WEIGHT: 91.2 KG
BH CV ECHO MEAS - EDV(CUBED): 88.7 ML
BH CV ECHO MEAS - EDV(MOD-SP4): 111 ML
BH CV ECHO MEAS - EDV(TEICH): 90.5 ML
BH CV ECHO MEAS - EF(CUBED): 64.1 %
BH CV ECHO MEAS - EF(MOD-SP4): 66.1 %
BH CV ECHO MEAS - EF(TEICH): 55.8 %
BH CV ECHO MEAS - ESV(CUBED): 31.9 ML
BH CV ECHO MEAS - ESV(MOD-SP4): 37.6 ML
BH CV ECHO MEAS - ESV(TEICH): 40 ML
BH CV ECHO MEAS - FS: 28.9 %
BH CV ECHO MEAS - IVS/LVPW: 1.6
BH CV ECHO MEAS - IVSD: 1.5 CM
BH CV ECHO MEAS - LA DIMENSION: 3.4 CM
BH CV ECHO MEAS - LA/AO: 0.93
BH CV ECHO MEAS - LAT PEAK E' VEL: 14.5 CM/SEC
BH CV ECHO MEAS - LV DIASTOLIC VOL/BSA (35-75): 51 ML/M^2
BH CV ECHO MEAS - LV MASS(C)D: 196.7 GRAMS
BH CV ECHO MEAS - LV MASS(C)DI: 90.3 GRAMS/M^2
BH CV ECHO MEAS - LV MAX PG: 6.3 MMHG
BH CV ECHO MEAS - LV MEAN PG: 4 MMHG
BH CV ECHO MEAS - LV SYSTOLIC VOL/BSA (12-30): 17.3 ML/M^2
BH CV ECHO MEAS - LV V1 MAX: 125 CM/SEC
BH CV ECHO MEAS - LV V1 MEAN: 94.6 CM/SEC
BH CV ECHO MEAS - LV V1 VTI: 28.8 CM
BH CV ECHO MEAS - LVIDD: 4.5 CM
BH CV ECHO MEAS - LVIDS: 3.2 CM
BH CV ECHO MEAS - LVLD AP4: 8.7 CM
BH CV ECHO MEAS - LVLS AP4: 6.5 CM
BH CV ECHO MEAS - LVOT AREA (M): 4.5 CM^2
BH CV ECHO MEAS - LVOT AREA: 4.5 CM^2
BH CV ECHO MEAS - LVOT DIAM: 2.4 CM
BH CV ECHO MEAS - LVPWD: 0.93 CM
BH CV ECHO MEAS - MED PEAK E' VEL: 6.4 CM/SEC
BH CV ECHO MEAS - MV A MAX VEL: 81.9 CM/SEC
BH CV ECHO MEAS - MV DEC SLOPE: 262 CM/SEC^2
BH CV ECHO MEAS - MV DEC TIME: 0.27 SEC
BH CV ECHO MEAS - MV E MAX VEL: 70.1 CM/SEC
BH CV ECHO MEAS - MV E/A: 0.86
BH CV ECHO MEAS - RAP SYSTOLE: 10 MMHG
BH CV ECHO MEAS - RVSP: 25.2 MMHG
BH CV ECHO MEAS - SI(AO): 208 ML/M^2
BH CV ECHO MEAS - SI(CUBED): 26.1 ML/M^2
BH CV ECHO MEAS - SI(LVOT): 59.8 ML/M^2
BH CV ECHO MEAS - SI(MOD-SP4): 33.7 ML/M^2
BH CV ECHO MEAS - SI(TEICH): 23.2 ML/M^2
BH CV ECHO MEAS - SV(AO): 453.1 ML
BH CV ECHO MEAS - SV(CUBED): 56.9 ML
BH CV ECHO MEAS - SV(LVOT): 130.3 ML
BH CV ECHO MEAS - SV(MOD-SP4): 73.4 ML
BH CV ECHO MEAS - SV(TEICH): 50.5 ML
BH CV ECHO MEAS - TR MAX VEL: 195 CM/SEC
BH CV ECHO MEASUREMENTS AVERAGE E/E' RATIO: 6.71
LEFT ATRIUM VOLUME INDEX: 22.8 ML/M2
LEFT ATRIUM VOLUME: 49.6 CM3
MAXIMAL PREDICTED HEART RATE: 160 BPM
STRESS TARGET HR: 136 BPM

## 2021-03-11 ENCOUNTER — OFFICE VISIT (OUTPATIENT)
Dept: CARDIOLOGY | Facility: CLINIC | Age: 61
End: 2021-03-11

## 2021-03-11 VITALS
HEIGHT: 74 IN | OXYGEN SATURATION: 98 % | HEART RATE: 57 BPM | WEIGHT: 208 LBS | BODY MASS INDEX: 26.69 KG/M2 | SYSTOLIC BLOOD PRESSURE: 142 MMHG | DIASTOLIC BLOOD PRESSURE: 88 MMHG

## 2021-03-11 DIAGNOSIS — I97.89 POSTOPERATIVE ATRIAL FIBRILLATION (HCC): Primary | ICD-10-CM

## 2021-03-11 DIAGNOSIS — I48.91 POSTOPERATIVE ATRIAL FIBRILLATION (HCC): Primary | ICD-10-CM

## 2021-03-11 DIAGNOSIS — I47.1 PSVT (PAROXYSMAL SUPRAVENTRICULAR TACHYCARDIA) (HCC): ICD-10-CM

## 2021-03-11 DIAGNOSIS — I10 ESSENTIAL HYPERTENSION: ICD-10-CM

## 2021-03-11 DIAGNOSIS — R00.1 SINUS BRADYCARDIA: ICD-10-CM

## 2021-03-11 PROCEDURE — 99214 OFFICE O/P EST MOD 30 MIN: CPT | Performed by: NURSE PRACTITIONER

## 2021-03-11 PROCEDURE — 93000 ELECTROCARDIOGRAM COMPLETE: CPT | Performed by: NURSE PRACTITIONER

## 2021-03-11 RX ORDER — LISINOPRIL 10 MG/1
5 TABLET ORAL DAILY
COMMUNITY

## 2021-03-11 RX ORDER — HYDROXYZINE HYDROCHLORIDE 10 MG/1
10 TABLET, FILM COATED ORAL EVERY 6 HOURS PRN
COMMUNITY

## 2021-03-11 RX ORDER — BUSPIRONE HYDROCHLORIDE 7.5 MG/1
7.5 TABLET ORAL 2 TIMES DAILY
COMMUNITY
End: 2022-11-03 | Stop reason: DRUGHIGH

## 2021-03-11 NOTE — PROGRESS NOTES
"Chief Complaint  Atrial Fibrillation (6mo F/U. Has been well. No chest pain, palpitations, or edema. )    Subjective          Viktor Mendez presents to [facility] for routine follow-up.  He has a history of perioperative paroxysmal atrial fibrillation, hypertension, colostomy, bradycardia, paroxysmal supraventricular tachycardia and hepatitis C.  Patient denies chest pain, shortness of breath, palpitations, dizziness, syncope, orthopnea, PND, edema or decreased stamina.  Patient denies any signs of bleeding.  Atrial Fibrillation  Presents for follow-up visit. Symptoms include bradycardia. Symptoms are negative for an AICD problem, chest pain, dizziness, hemodynamic instability, hypertension, hypotension, pacemaker problem, palpitations, shortness of breath, syncope, tachycardia and weakness. The symptoms have been stable. Past medical history includes atrial fibrillation. There are no medication compliance problems.   Hypertension  This is a chronic problem. The current episode started more than 1 month ago. Pertinent negatives include no anxiety, blurred vision, chest pain, headaches, malaise/fatigue, neck pain, orthopnea, palpitations, peripheral edema, PND, shortness of breath or sweats. Current antihypertension treatment includes ACE inhibitors. The current treatment provides significant improvement.       Objective   Vital Signs:   /88   Pulse 57   Ht 188 cm (74\")   Wt 94.3 kg (208 lb)   SpO2 98%   BMI 26.71 kg/m²     Vitals and nursing note reviewed.   Constitutional:       General: Awake.      Appearance: Normal and healthy appearance. Well-developed, normal weight and not in distress.   Eyes:      General: Lids are normal.      Conjunctiva/sclera: Conjunctivae normal.      Pupils: Pupils are equal, round, and reactive to light.   HENT:      Head: Normocephalic and atraumatic.      Nose: Nose normal.   Neck:      Vascular: No JVR. JVD normal.   Pulmonary:      Effort: " Pulmonary effort is normal.      Breath sounds: Normal breath sounds. No wheezing. No rhonchi. No rales.   Chest:      Chest wall: Not tender to palpatation.   Cardiovascular:      PMI at left midclavicular line. Normal rate. Regular rhythm. Normal S1. Normal S2.      Murmurs: There is no murmur.      No gallop. No click. No rub.   Pulses:     Intact distal pulses.   Edema:     Peripheral edema absent.   Abdominal:      General: Bowel sounds are normal.      Palpations: Abdomen is soft.      Tenderness: There is no abdominal tenderness.   Musculoskeletal: Normal range of motion.         General: No tenderness.      Cervical back: Normal range of motion. Skin:     General: Skin is warm and dry.   Neurological:      General: No focal deficit present.      Mental Status: Alert, oriented to person, place, and time and oriented to person, place and time.   Psychiatric:         Attention and Perception: Attention and perception normal.         Mood and Affect: Mood and affect normal.         Speech: Speech normal.         Behavior: Behavior normal. Behavior is cooperative.         Thought Content: Thought content normal.         Cognition and Memory: Cognition and memory normal.         Judgment: Judgment normal.         Result Review :   The following data was reviewed by: RONI Moreau on 03/11/2021:      Data reviewed: Cardiology studies 2D echo 9/23/20.      ECG 12 Lead    Date/Time: 3/11/2021 2:08 PM  Performed by: Dagmar Meyer APRN  Authorized by: Dagmar Meyer APRN   Comparison: compared with previous ECG from 8/28/2020  Similar to previous ECG  Rhythm: sinus bradycardia  Rate: normal  BPM: 57  Q waves: II, III and aVF      Clinical impression: abnormal EKG              Assessment and Plan    Diagnoses and all orders for this visit:    1. Postoperative atrial fibrillation (CMS/HCC) (Primary)- following abdominal surgery for diverticulitis resulting in colostomy in [place]. Remotely. No known  recurrence.     2. PSVT (paroxysmal supraventricular tachycardia) (CMS/HCC)- asymptomatic.     3. Sinus bradycardia- asymptomatic.     4. Essential hypertension- borderline in office today. Management per primary care provider.  Continue lisinopril.  Monitor and record daily blood pressure. Report readings consistently higher than 140/90 or consistently lower than 100/60.         Follow Up   Return in about 6 months (around 9/11/2021) for Next scheduled follow up with Dr. Barton.  Patient was given instructions and counseling regarding his condition or for health maintenance advice. Please see specific information pulled into the AVS if appropriate.

## 2021-07-05 ENCOUNTER — HOSPITAL ENCOUNTER (EMERGENCY)
Facility: HOSPITAL | Age: 61
Discharge: HOME | End: 2021-07-05
Attending: STUDENT IN AN ORGANIZED HEALTH CARE EDUCATION/TRAINING PROGRAM | Admitting: STUDENT IN AN ORGANIZED HEALTH CARE EDUCATION/TRAINING PROGRAM

## 2021-07-05 ENCOUNTER — APPOINTMENT (OUTPATIENT)
Dept: CT IMAGING | Facility: HOSPITAL | Age: 61
End: 2021-07-05

## 2021-07-05 ENCOUNTER — APPOINTMENT (OUTPATIENT)
Dept: GENERAL RADIOLOGY | Facility: HOSPITAL | Age: 61
End: 2021-07-05

## 2021-07-05 VITALS
SYSTOLIC BLOOD PRESSURE: 110 MMHG | RESPIRATION RATE: 16 BRPM | BODY MASS INDEX: 25.93 KG/M2 | OXYGEN SATURATION: 97 % | HEART RATE: 72 BPM | DIASTOLIC BLOOD PRESSURE: 82 MMHG | TEMPERATURE: 97.4 F | HEIGHT: 74 IN | WEIGHT: 202 LBS

## 2021-07-05 DIAGNOSIS — I48.0 PAROXYSMAL ATRIAL FIBRILLATION WITH RAPID VENTRICULAR RESPONSE (HCC): Primary | ICD-10-CM

## 2021-07-05 LAB
ALBUMIN SERPL-MCNC: 4.2 G/DL (ref 3.5–5.2)
ALBUMIN/GLOB SERPL: 1.6 G/DL
ALP SERPL-CCNC: 83 U/L (ref 39–117)
ALT SERPL W P-5'-P-CCNC: 16 U/L (ref 1–41)
ANION GAP SERPL CALCULATED.3IONS-SCNC: 9 MMOL/L (ref 5–15)
AST SERPL-CCNC: 18 U/L (ref 1–40)
BASOPHILS # BLD AUTO: 0.03 10*3/MM3 (ref 0–0.2)
BASOPHILS NFR BLD AUTO: 0.4 % (ref 0–1.5)
BILIRUB SERPL-MCNC: 0.7 MG/DL (ref 0–1.2)
BUN SERPL-MCNC: 13 MG/DL (ref 8–23)
BUN/CREAT SERPL: 17.1 (ref 7–25)
CALCIUM SPEC-SCNC: 9.2 MG/DL (ref 8.6–10.5)
CHLORIDE SERPL-SCNC: 108 MMOL/L (ref 98–107)
CO2 SERPL-SCNC: 26 MMOL/L (ref 22–29)
CREAT SERPL-MCNC: 0.76 MG/DL (ref 0.76–1.27)
DEPRECATED RDW RBC AUTO: 41.2 FL (ref 37–54)
EOSINOPHIL # BLD AUTO: 0.16 10*3/MM3 (ref 0–0.4)
EOSINOPHIL NFR BLD AUTO: 2.3 % (ref 0.3–6.2)
ERYTHROCYTE [DISTWIDTH] IN BLOOD BY AUTOMATED COUNT: 13.1 % (ref 12.3–15.4)
GFR SERPL CREATININE-BSD FRML MDRD: 104 ML/MIN/1.73
GLOBULIN UR ELPH-MCNC: 2.7 GM/DL
GLUCOSE SERPL-MCNC: 136 MG/DL (ref 65–99)
HCT VFR BLD AUTO: 37 % (ref 37.5–51)
HGB BLD-MCNC: 13.5 G/DL (ref 13–17.7)
HOLD SPECIMEN: NORMAL
HOLD SPECIMEN: NORMAL
IMM GRANULOCYTES # BLD AUTO: 0.02 10*3/MM3 (ref 0–0.05)
IMM GRANULOCYTES NFR BLD AUTO: 0.3 % (ref 0–0.5)
LYMPHOCYTES # BLD AUTO: 2.14 10*3/MM3 (ref 0.7–3.1)
LYMPHOCYTES NFR BLD AUTO: 31.3 % (ref 19.6–45.3)
MCH RBC QN AUTO: 31.9 PG (ref 26.6–33)
MCHC RBC AUTO-ENTMCNC: 36.5 G/DL (ref 31.5–35.7)
MCV RBC AUTO: 87.5 FL (ref 79–97)
MONOCYTES # BLD AUTO: 0.49 10*3/MM3 (ref 0.1–0.9)
MONOCYTES NFR BLD AUTO: 7.2 % (ref 5–12)
NEUTROPHILS NFR BLD AUTO: 3.99 10*3/MM3 (ref 1.7–7)
NEUTROPHILS NFR BLD AUTO: 58.5 % (ref 42.7–76)
NRBC BLD AUTO-RTO: 0 /100 WBC (ref 0–0.2)
PLATELET # BLD AUTO: 153 10*3/MM3 (ref 140–450)
PMV BLD AUTO: 10 FL (ref 6–12)
POTASSIUM SERPL-SCNC: 3.9 MMOL/L (ref 3.5–5.2)
PROT SERPL-MCNC: 6.9 G/DL (ref 6–8.5)
RBC # BLD AUTO: 4.23 10*6/MM3 (ref 4.14–5.8)
SODIUM SERPL-SCNC: 143 MMOL/L (ref 136–145)
TROPONIN T SERPL-MCNC: <0.01 NG/ML (ref 0–0.03)
TROPONIN T SERPL-MCNC: <0.01 NG/ML (ref 0–0.03)
WBC # BLD AUTO: 6.83 10*3/MM3 (ref 3.4–10.8)
WHOLE BLOOD HOLD SPECIMEN: NORMAL

## 2021-07-05 PROCEDURE — 99284 EMERGENCY DEPT VISIT MOD MDM: CPT

## 2021-07-05 PROCEDURE — 71045 X-RAY EXAM CHEST 1 VIEW: CPT

## 2021-07-05 PROCEDURE — 93010 ELECTROCARDIOGRAM REPORT: CPT | Performed by: INTERNAL MEDICINE

## 2021-07-05 PROCEDURE — 70450 CT HEAD/BRAIN W/O DYE: CPT

## 2021-07-05 PROCEDURE — 80053 COMPREHEN METABOLIC PANEL: CPT | Performed by: STUDENT IN AN ORGANIZED HEALTH CARE EDUCATION/TRAINING PROGRAM

## 2021-07-05 PROCEDURE — 93005 ELECTROCARDIOGRAM TRACING: CPT | Performed by: EMERGENCY MEDICINE

## 2021-07-05 PROCEDURE — 96374 THER/PROPH/DIAG INJ IV PUSH: CPT

## 2021-07-05 PROCEDURE — 93005 ELECTROCARDIOGRAM TRACING: CPT | Performed by: STUDENT IN AN ORGANIZED HEALTH CARE EDUCATION/TRAINING PROGRAM

## 2021-07-05 PROCEDURE — 84484 ASSAY OF TROPONIN QUANT: CPT | Performed by: STUDENT IN AN ORGANIZED HEALTH CARE EDUCATION/TRAINING PROGRAM

## 2021-07-05 PROCEDURE — 85025 COMPLETE CBC W/AUTO DIFF WBC: CPT | Performed by: STUDENT IN AN ORGANIZED HEALTH CARE EDUCATION/TRAINING PROGRAM

## 2021-07-05 RX ORDER — METOPROLOL TARTRATE 5 MG/5ML
5 INJECTION INTRAVENOUS
Status: DISCONTINUED | OUTPATIENT
Start: 2021-07-05 | End: 2021-07-05 | Stop reason: HOSPADM

## 2021-07-05 RX ORDER — ASPIRIN 81 MG/1
324 TABLET, CHEWABLE ORAL ONCE
Status: COMPLETED | OUTPATIENT
Start: 2021-07-05 | End: 2021-07-05

## 2021-07-05 RX ORDER — RIVAROXABAN 15 MG-20MG
KIT ORAL
Qty: 51 TABLET | Refills: 0 | Status: SHIPPED | OUTPATIENT
Start: 2021-07-05 | End: 2021-07-07

## 2021-07-05 RX ORDER — NITROGLYCERIN 0.4 MG/1
0.4 TABLET SUBLINGUAL
Status: DISCONTINUED | OUTPATIENT
Start: 2021-07-05 | End: 2021-07-05

## 2021-07-05 RX ORDER — SODIUM CHLORIDE 0.9 % (FLUSH) 0.9 %
10 SYRINGE (ML) INJECTION AS NEEDED
Status: DISCONTINUED | OUTPATIENT
Start: 2021-07-05 | End: 2021-07-05 | Stop reason: HOSPADM

## 2021-07-05 RX ADMIN — ASPIRIN 243 MG: 81 TABLET, CHEWABLE ORAL at 12:17

## 2021-07-05 RX ADMIN — METOPROLOL TARTRATE 5 MG: 5 INJECTION INTRAVENOUS at 12:25

## 2021-07-05 NOTE — ED PROVIDER NOTES
Subjective   Patient states for the past few days he has been feeling slightly lightheaded and fatigued.  States that he does not know why this is happening.  States that he has been able to tolerate oral intake and has been having normal bowel movements.  States that he works as a .  States that he has been hydrating well.  States that he is not having any chest pain or shortness of breath.  Denies any palpitations at this time.  States that he has a history of A. fib after a surgery sometime ago but he was never started on any medication.  States that he only takes aspirin every day.  States that he has not had any abdominal pain or any vomiting.  Denies any dizziness at this time.  States that he just feels fatigued but denies any fevers or chills as well.  Denies any numbness or tingling.          Review of Systems   All other systems reviewed and are negative.      Past Medical History:   Diagnosis Date   • Atrial fibrillation (CMS/HCC)    • DDD (degenerative disc disease), lumbar    • Essential hypertension 3/11/2021   • Hepatitis C    • Kidney cysts        No Known Allergies    Past Surgical History:   Procedure Laterality Date   • COLONOSCOPY     • COLOSTOMY     • EYE SURGERY         Family History   Problem Relation Age of Onset   • Diabetes Mother        Social History     Socioeconomic History   • Marital status: Single     Spouse name: Not on file   • Number of children: Not on file   • Years of education: Not on file   • Highest education level: Not on file   Tobacco Use   • Smoking status: Former Smoker     Packs/day: 2.00     Years: 30.00     Pack years: 60.00     Types: Cigarettes   • Smokeless tobacco: Never Used   • Tobacco comment: QUIT 10 YEARS AGO    Substance and Sexual Activity   • Alcohol use: No   • Drug use: No   • Sexual activity: Defer           Objective   Physical Exam  Constitutional:       Appearance: Normal appearance.   HENT:      Head: Normocephalic and atraumatic.       Nose: Nose normal.      Mouth/Throat:      Mouth: Mucous membranes are moist.   Eyes:      Extraocular Movements: Extraocular movements intact.   Cardiovascular:      Rate and Rhythm: Normal rate and regular rhythm.      Pulses: Normal pulses.      Heart sounds: Normal heart sounds. No murmur heard.   No friction rub. No gallop.    Pulmonary:      Effort: Pulmonary effort is normal. No respiratory distress.      Breath sounds: Normal breath sounds. No stridor. No wheezing, rhonchi or rales.   Chest:      Chest wall: No tenderness.   Abdominal:      General: Abdomen is flat. Bowel sounds are normal. There is no distension.      Palpations: Abdomen is soft. There is no mass.      Tenderness: There is no abdominal tenderness. There is no guarding or rebound.   Musculoskeletal:         General: No swelling, tenderness, deformity or signs of injury. Normal range of motion.      Cervical back: Normal range of motion.   Skin:     General: Skin is warm and dry.      Capillary Refill: Capillary refill takes less than 2 seconds.      Coloration: Skin is not jaundiced or pale.      Findings: No bruising, erythema or rash.   Neurological:      General: No focal deficit present.      Mental Status: He is alert and oriented to person, place, and time.   Psychiatric:         Mood and Affect: Mood normal.         Behavior: Behavior normal.         Thought Content: Thought content normal.         Judgment: Judgment normal.         ECG 12 Lead      Date/Time: 7/5/2021 1:45 PM  Performed by: Louisa Jeong MD  Authorized by: Louisa Jeong MD                    ED Course                                           MDM  Number of Diagnoses or Management Options  Paroxysmal atrial fibrillation with rapid ventricular response (CMS/HCC): established and improving  Diagnosis management comments: Patient arrived hemodynamically stable and is afebrile.  Given his history and his examination patient was placed on the monitor and IV access was  established.  Was anticoagulated, troponin and EKG.  Also plan to obtain a CT scan of the head and a chest x-ray.  Patient's labs and images were all reviewed by and have no acute abnormalities.  Patient was given 5 mg of IV metoprolol after an EKG was reviewed and the patient was found to be in A. fib with RVR.  Patient returned to a normal rate however intermittently had episodes of A. fib.  Given this we consulted the cardiology service for recommendations.  Cardiology service recommended starting patient on oral anticoagulation as well as Lopressor for rate control.  Patient was okay with this plan.  I went over the risks and benefits of starting oral anticoagulation with Xarelto.  Patient and his son had some questions were all answered.  I encouraged the patient to follow-up with his PCP and with cardiology.  Encouraged him to take his medication as prescribed.  Patient was okay with this plan.  Patient was feeling much better after his heart rate returned to normal.  Patient was okay with being discharged this time.  Patient was discharged stable condition to follow-up.       Amount and/or Complexity of Data Reviewed  Clinical lab tests: reviewed  Tests in the radiology section of CPT®: reviewed  Tests in the medicine section of CPT®: reviewed        Final diagnoses:   None       ED Disposition  ED Disposition     None          No follow-up provider specified.       Medication List      No changes were made to your prescriptions during this visit.          Louisa Jeong MD  07/06/21 0947

## 2021-07-05 NOTE — DISCHARGE INSTRUCTIONS
Please take the Xarelto as prescribed.  Please stop taking the aspirin while you are taking Xarelto.  Please take the metoprolol as prescribed.  Please follow-up with your primary care doctor and with your cardiologist at your appointment.  Please call the cardiologist to expedite your follow-up.  Return to the ER if you have any other concerns.  Please return to the ER within 24 to 48 hours if you have new symptoms, other concerns or any other questions.

## 2021-07-06 LAB
QT INTERVAL: 314 MS
QT INTERVAL: 368 MS
QTC INTERVAL: 410 MS
QTC INTERVAL: 422 MS

## 2021-07-07 ENCOUNTER — OFFICE VISIT (OUTPATIENT)
Dept: CARDIOLOGY | Facility: CLINIC | Age: 61
End: 2021-07-07

## 2021-07-07 VITALS
HEART RATE: 65 BPM | WEIGHT: 201 LBS | SYSTOLIC BLOOD PRESSURE: 120 MMHG | DIASTOLIC BLOOD PRESSURE: 80 MMHG | BODY MASS INDEX: 26.64 KG/M2 | OXYGEN SATURATION: 100 % | HEIGHT: 73 IN

## 2021-07-07 DIAGNOSIS — I47.1 PSVT (PAROXYSMAL SUPRAVENTRICULAR TACHYCARDIA) (HCC): ICD-10-CM

## 2021-07-07 DIAGNOSIS — R00.1 SINUS BRADYCARDIA: ICD-10-CM

## 2021-07-07 DIAGNOSIS — R06.09 DOE (DYSPNEA ON EXERTION): ICD-10-CM

## 2021-07-07 DIAGNOSIS — I48.0 PAF (PAROXYSMAL ATRIAL FIBRILLATION) (HCC): ICD-10-CM

## 2021-07-07 DIAGNOSIS — I10 ESSENTIAL HYPERTENSION: Primary | ICD-10-CM

## 2021-07-07 PROCEDURE — 99214 OFFICE O/P EST MOD 30 MIN: CPT | Performed by: INTERNAL MEDICINE

## 2021-07-07 NOTE — PROGRESS NOTES
Viktro Mendez  [id number]  1960  61 y.o.  male    Referring Provider: Kaz Galindo DO    Reason for Follow-up Visit:   Routine follow up.   Low risk stress test with normal LVEF    Had periop Paroxysmal atrial fibrillation during abdominal surgery in [place] for diverticulitis and colostomy  sinus bradycardia  now improved after changing eye drops  Was in ER for atrial fibrillation   Started on on anticoagulation    Subjective    Mild chronic exertional shortness of breath on exertion relieved with rest  No significant cough or wheezing    Intermittent palpitations, once every several days to several weeks lasting for less than 1 minute  associated symptoms of dizziness, weakness,   No chest pain  Mild shortness of breath at baseline that gets worse     Intermittent dizzy spells, no presyncope or syncope   No associated chest pain  No significant pedal edema    No fever or chills  No significant expectoration    No hemoptysis  No presyncope or syncope    Tolerating current medications well with no untoward side effects   Compliant with prescribed medication regimen. Tries to adhere to cardiac diet.       BP well controlled at home.     Was started on antihypertensives by Dr Galindo which was subsequently stopped  Has anxiety  No bleeding, excessive bruising, gait instability or fall risks      He reports being under a lot of stress lately on account of work         History of present illness:  Viktor Mendez is a 61 y.o. yo male with history of paroxysmal atrial fibrillation who presents today for   Chief Complaint   Patient presents with   • Atrial Fibrillation     1 week recent ER    .    History  Past Medical History:   Diagnosis Date   • Atrial fibrillation (CMS/HCC)    • DDD (degenerative disc disease), lumbar    • Essential hypertension 3/11/2021   • Hepatitis C    • Kidney cysts    ,   Past Surgical History:   Procedure Laterality Date   • COLONOSCOPY     • COLOSTOMY     • EYE SURGERY     ,    Family History   Problem Relation Age of Onset   • Diabetes Mother    ,   Social History     Tobacco Use   • Smoking status: Former Smoker     Packs/day: 2.00     Years: 30.00     Pack years: 60.00     Types: Cigarettes   • Smokeless tobacco: Never Used   • Tobacco comment: QUIT 10 YEARS AGO    Substance Use Topics   • Alcohol use: No   • Drug use: No   ,     Medications  Current Outpatient Medications   Medication Sig Dispense Refill   • busPIRone (BUSPAR) 7.5 MG tablet Take 7.5 mg by mouth Daily.     • dorzolamide-timolol (COSOPT) 22.3-6.8 MG/ML ophthalmic solution 1 drop Every Morning.     • hydrOXYzine (ATARAX) 10 MG tablet Take 10 mg by mouth Every Night.     • latanoprost (XALATAN) 0.005 % ophthalmic solution 1 drop Every Night.     • lisinopril (PRINIVIL,ZESTRIL) 10 MG tablet Take 10 mg by mouth 2 (two) times a day.     • metoprolol tartrate (LOPRESSOR) 25 MG tablet Take 0.5 tablets by mouth 2 (two) times a day. 60 tablet 0   • Multiple Vitamins-Minerals (MULTIVITAL PO) Take  by mouth.     • rivaroxaban (Xarelto) 20 MG tablet Take 1 tablet by mouth Daily. 30 tablet 11     No current facility-administered medications for this visit.   Holter  · Average HR: 74. Min HR: 55. Max HR: 120.     · The predominant rhythm noted during the testing period was sinus rhythm.  · Premature atrial and ventricular contractions occured rarely.  · Total ectopy burden during the monitoring period; PVCs:    9    and APCs:    91      · 5   supraventricular tachycardia episodes  longest  11     beats at maximum rate of  127      BPM      · No significant  ventricular tachy or mary arrhythmia.  · No correlated arrhythmia  · No significant pauses   myocardial perfusion scan    Low risk stress test with normal LVEF     Allergies:  Patient has no known allergies.    Review of Systems  Review of Systems   Constitutional: Positive for decreased appetite. Negative for malaise/fatigue.   HENT: Negative.    Eyes: Negative.   "  Cardiovascular: Positive for dyspnea on exertion. Negative for chest pain, claudication, cyanosis, irregular heartbeat, leg swelling, near-syncope, orthopnea, palpitations, paroxysmal nocturnal dyspnea and syncope.   Respiratory: Negative.    Endocrine: Negative.    Hematologic/Lymphatic: Negative.    Skin: Negative.    Musculoskeletal: Positive for arthritis and back pain.   Gastrointestinal: Negative for anorexia.   Genitourinary: Negative.    Neurological: Negative for weakness.   Psychiatric/Behavioral: Negative.        Objective     Physical Exam:  /80   Pulse 65   Ht 185.4 cm (73\")   Wt 91.2 kg (201 lb)   SpO2 100%   BMI 26.52 kg/m²   Physical Exam   Constitutional: He appears well-developed.   HENT:   Head: Normocephalic.   Neck: Normal carotid pulses and no JVD present. No tracheal tenderness present. Carotid bruit is not present. No tracheal deviation present.   Cardiovascular: Normal pulses. An irregularly irregular rhythm present.   Murmur heard.   Systolic murmur is present with a grade of 2/6.  Pulmonary/Chest: Effort normal. No stridor.   Abdominal: Soft. He exhibits no distension. There is no abdominal tenderness. There is no rigidity.   Neurological: He is alert. No cranial nerve deficit or sensory deficit.   Skin: Skin is warm.   Psychiatric: His speech is normal and behavior is normal.       Results Review:    Results for orders placed during the hospital encounter of 09/23/20    Adult Transthoracic Echo Complete W/ Cont if Necessary Per Protocol    Interpretation Summary  · Left ventricular ejection fraction appears to be 61 - 65%. Left ventricular systolic function is normal.  · Normal global longitudinal LV strain (GLS) = -19.2%.  · Left ventricular diastolic function was normal.  · No evidence of pulmonary hypertension is present.     myocardial perfusion scan  2017    · Myocardial perfusion imaging indicates a normal myocardial perfusion study with no evidence of ischemia.  · Left " ventricular ejection fraction is normal (Calculated EF = 65%).  · Diaphragmatic attenuation and GI artifacts are present.  · Raw images reviewed with the following abnormalities noted: vertical motion artifact.  · Impressions are consistent with a low risk study.     Holter 2017    · Average HR: 74. Min HR: 55. Max HR: 120.     · The predominant rhythm noted during the testing period was sinus rhythm.  · Premature atrial and ventricular contractions occured rarely.  · Total ectopy burden during the monitoring period; PVCs:    9    and APCs:    91      · 5   supraventricular tachycardia episodes  longest  11     beats at maximum rate of  127      BPM      · No significant  ventricular tachy or mary arrhythmia.  · No correlated arrhythmia  · No significant pauses        Procedures    Assessment/Plan   Diagnoses and all orders for this visit:    1. Essential hypertension (Primary)    2. Sinus bradycardia    3. PSVT (paroxysmal supraventricular tachycardia) (CMS/[facility])    4. CHAKRABORTY (dyspnea on exertion)    5. PAF (paroxysmal atrial fibrillation) (CMS/[facility])  -     Cardioversion External in Cardiology Department; Future    Other orders  -     rivaroxaban (Xarelto) 20 MG tablet; Take 1 tablet by mouth Daily.  Dispense: 30 tablet; Refill: 11           Plan      DC cardioversion after adequate duration and therapeutic anticoagulation last week of August    Orders Placed This Encounter   Procedures   • Cardioversion External in Cardiology Department     Last week of August     Standing Status:   Future     Standing Expiration Date:   7/7/2022     Order Specific Question:   What type of sedation does the patient require?     Answer:   Moderate Sedation     Order Specific Question:   At which hospital location will this be performed?     Answer:   [facility]     Order Specific Question:   Reason for Exam:     Answer:   AF      Continue on anticoagulation     Requested Prescriptions     Signed Prescriptions Disp Refills   • rivaroxaban  (Xarelto) 20 MG tablet 30 tablet 11     Sig: Take 1 tablet by mouth Daily.        I support the patient's decision to take the Covid -19 vaccine   Had both dose   No major issues   Now fully immunized      Check BP and heart rates twice daily at least 3x / week  at home and bring a recording for me to review next visit  If BP >130/85 or < 100/60 persistently over 3 reading 30 mins apart call sooner      Any mid level provider working with me to address interim issues            Return in about 15 weeks (around 10/20/2021).

## 2021-07-27 ENCOUNTER — OFFICE VISIT (OUTPATIENT)
Dept: CARDIOLOGY | Facility: CLINIC | Age: 61
End: 2021-07-27

## 2021-07-27 VITALS
WEIGHT: 202 LBS | HEART RATE: 56 BPM | SYSTOLIC BLOOD PRESSURE: 136 MMHG | DIASTOLIC BLOOD PRESSURE: 80 MMHG | BODY MASS INDEX: 26.77 KG/M2 | HEIGHT: 73 IN | OXYGEN SATURATION: 98 %

## 2021-07-27 DIAGNOSIS — I10 ESSENTIAL HYPERTENSION: ICD-10-CM

## 2021-07-27 DIAGNOSIS — I47.1 PSVT (PAROXYSMAL SUPRAVENTRICULAR TACHYCARDIA) (HCC): Primary | ICD-10-CM

## 2021-07-27 DIAGNOSIS — R00.1 SINUS BRADYCARDIA: ICD-10-CM

## 2021-07-27 DIAGNOSIS — I48.0 PAF (PAROXYSMAL ATRIAL FIBRILLATION) (HCC): ICD-10-CM

## 2021-07-27 DIAGNOSIS — R06.09 DOE (DYSPNEA ON EXERTION): ICD-10-CM

## 2021-07-27 PROCEDURE — 99214 OFFICE O/P EST MOD 30 MIN: CPT | Performed by: INTERNAL MEDICINE

## 2021-07-27 PROCEDURE — 93000 ELECTROCARDIOGRAM COMPLETE: CPT | Performed by: INTERNAL MEDICINE

## 2021-07-27 NOTE — PROGRESS NOTES
"Viktor Mendez  [id number]  1960  61 y.o.  male    Referring Provider: Kaz Galindo DO    Reason for Follow-up Visit:   Routine follow up.   Low risk stress test with normal LVEF    Had periop Paroxysmal atrial fibrillation during abdominal surgery in [place] for diverticulitis and colostomy  sinus bradycardia  now improved after changing eye drops  Was in ER for atrial fibrillation   Started on on anticoagulation    Subjective    Overall feels the same   No new events or complaints since last visit   Overall the patient feels no major change from baseline symptoms   Similar symptoms as during last visit      Mild chronic exertional shortness of breath on exertion relieved with rest  No significant cough or wheezing    Intermittent palpitations, once every several days to several weeks lasting for less than 1 minute  associated symptoms of dizziness, weakness,   No chest pain  Mild shortness of breath at baseline that gets worse     Intermittent dizzy spells, no presyncope or syncope   No associated chest pain  No significant pedal edema    No fever or chills  No significant expectoration    No hemoptysis  No presyncope or syncope    Tolerating current medications well with no untoward side effects   Compliant with prescribed medication regimen. Tries to adhere to cardiac diet.       BP well controlled at home.     In past was low and decreased Lisinopril from 10 mg BID to daily  Was started on antihypertensives by Dr Galindo which was subsequently stopped  Has anxiety  No bleeding, excessive bruising, gait instability or fall risks      He reports being under a lot of stress lately on account of work   Continues to have stress        History of present illness:  Viktor Mendez is a 61 y.o. yo male with history of paroxysmal atrial fibrillation who presents today for   Chief Complaint   Patient presents with   • Atrial Fibrillation     3 wk - medication questions - pt reports feeling \"foggy headed\" " with no energy - frequent belching/burping   .    History  Past Medical History:   Diagnosis Date   • Atrial fibrillation (CMS/HCC)    • DDD (degenerative disc disease), lumbar    • Essential hypertension 3/11/2021   • Hepatitis C    • Kidney cysts    ,   Past Surgical History:   Procedure Laterality Date   • COLONOSCOPY     • COLOSTOMY     • EYE SURGERY     ,   Family History   Problem Relation Age of Onset   • Diabetes Mother    ,   Social History     Tobacco Use   • Smoking status: Former Smoker     Packs/day: 2.00     Years: 30.00     Pack years: 60.00     Types: Cigarettes   • Smokeless tobacco: Never Used   • Tobacco comment: QUIT 10 YEARS AGO    Substance Use Topics   • Alcohol use: No   • Drug use: No   ,     Medications  Current Outpatient Medications   Medication Sig Dispense Refill   • busPIRone (BUSPAR) 7.5 MG tablet Take 7.5 mg by mouth Daily.     • dorzolamide-timolol (COSOPT) 22.3-6.8 MG/ML ophthalmic solution 1 drop Every Morning.     • hydrOXYzine (ATARAX) 10 MG tablet Take 10 mg by mouth Every Night.     • latanoprost (XALATAN) 0.005 % ophthalmic solution 1 drop Every Night.     • lisinopril (PRINIVIL,ZESTRIL) 10 MG tablet Take 10 mg by mouth Daily.     • metoprolol tartrate (LOPRESSOR) 25 MG tablet Take 0.5 tablets by mouth 2 (two) times a day. 60 tablet 0   • Multiple Vitamins-Minerals (MULTIVITAL PO) Take  by mouth.     • rivaroxaban (Xarelto) 20 MG tablet Take 1 tablet by mouth Daily. 30 tablet 11     No current facility-administered medications for this visit.   Holter  · Average HR: 74. Min HR: 55. Max HR: 120.     · The predominant rhythm noted during the testing period was sinus rhythm.  · Premature atrial and ventricular contractions occured rarely.  · Total ectopy burden during the monitoring period; PVCs:    9    and APCs:    91      · 5   supraventricular tachycardia episodes  longest  11     beats at maximum rate of  127      BPM      · No significant  ventricular tachy or mary  "arrhythmia.  · No correlated arrhythmia  · No significant pauses   myocardial perfusion scan    Low risk stress test with normal LVEF     Allergies:  Patient has no known allergies.    Review of Systems  Review of Systems   Constitutional: Positive for decreased appetite. Negative for malaise/fatigue.   HENT: Negative.    Eyes: Negative.    Cardiovascular: Positive for dyspnea on exertion. Negative for chest pain, claudication, cyanosis, irregular heartbeat, leg swelling, near-syncope, orthopnea, palpitations, paroxysmal nocturnal dyspnea and syncope.   Respiratory: Negative.    Endocrine: Negative.    Hematologic/Lymphatic: Negative.    Skin: Negative.    Musculoskeletal: Positive for arthritis and back pain.   Gastrointestinal: Negative for anorexia.   Genitourinary: Negative.    Neurological: Negative for weakness.   Psychiatric/Behavioral: Negative.        Objective     Physical Exam:  /80   Pulse 56   Ht 185.4 cm (73\")   Wt 91.6 kg (202 lb)   SpO2 98%   BMI 26.65 kg/m²   Physical Exam   Constitutional: He appears well-developed.   HENT:   Head: Normocephalic.   Neck: Normal carotid pulses and no JVD present. No tracheal tenderness present. Carotid bruit is not present. No tracheal deviation present.   Cardiovascular: Regular rhythm and normal pulses.   Murmur heard.   Systolic murmur is present with a grade of 2/6.  Pulmonary/Chest: Effort normal. No stridor.   Abdominal: Soft. He exhibits no distension. There is no abdominal tenderness. There is no rigidity.   Neurological: He is alert. No cranial nerve deficit or sensory deficit.   Skin: Skin is warm.   Psychiatric: His speech is normal and behavior is normal.       Results Review:    Results for orders placed during the hospital encounter of 09/23/20    Adult Transthoracic Echo Complete W/ Cont if Necessary Per Protocol    Interpretation Summary  · Left ventricular ejection fraction appears to be 61 - 65%. Left ventricular systolic function is " normal.  · Normal global longitudinal LV strain (GLS) = -19.2%.  · Left ventricular diastolic function was normal.  · No evidence of pulmonary hypertension is present.     myocardial perfusion scan  2017    · Myocardial perfusion imaging indicates a normal myocardial perfusion study with no evidence of ischemia.  · Left ventricular ejection fraction is normal (Calculated EF = 65%).  · Diaphragmatic attenuation and GI artifacts are present.  · Raw images reviewed with the following abnormalities noted: vertical motion artifact.  · Impressions are consistent with a low risk study.     Holter 2017    · Average HR: 74. Min HR: 55. Max HR: 120.     · The predominant rhythm noted during the testing period was sinus rhythm.  · Premature atrial and ventricular contractions occured rarely.  · Total ectopy burden during the monitoring period; PVCs:    9    and APCs:    91      · 5   supraventricular tachycardia episodes  longest  11     beats at maximum rate of  127      BPM      · No significant  ventricular tachy or mary arrhythmia.  · No correlated arrhythmia  · No significant pauses          ECG 12 Lead    Date/Time: 7/27/2021 9:36 AM  Performed by: Carlito Barton MD  Authorized by: Carlito Barton MD   Comparison: compared with previous ECG from 7/5/2021  Comparison to previous ECG: sinus bradycardia replaced atrial fibrillation    Rhythm: sinus bradycardia  Rate: bradycardic  Conduction: conduction normal  ST Segments: ST segments normal  T Waves: T waves normal  QRS axis: normal  Other: no other findings    Clinical impression: abnormal EKG            Assessment/Plan   Diagnoses and all orders for this visit:    1. PSVT (paroxysmal supraventricular tachycardia) (CMS/HCC) (Primary)    2. Sinus bradycardia    3. Essential hypertension    4. CHAKRABORTY (dyspnea on exertion)    5. PAF (paroxysmal atrial fibrillation) (CMS/HCC)  -     Holter Monitor - 72 Hour Up To 15 Days; Future    Other orders  -     ECG 12 Lead            Plan      See GI       Orders Placed This Encounter   Procedures   • Holter Monitor - 72 Hour Up To 15 Days     Standing Status:   Future     Standing Expiration Date:   7/27/2022     Order Specific Question:   Reason for exam?     Answer:   AFib     Order Specific Question:   Release to patient     Answer:   Immediate   • ECG 12 Lead     This order was created via procedure documentation     Order Specific Question:   Release to patient     Answer:   Immediate      Cancel cardioversion as in sinus rhythm now   OK to stop beta blocker therapy as severe fatigue and dizziness    I support the patient's decision to take the Covid -19 vaccine   Had both dose   No major issues   Now fully immunized      Check BP and heart rates twice daily  at home and bring a recording for me to review next visit  If BP >130/85 or < 100/60 persistently over 3 reading 30 mins apart call sooner      Any mid level provider working with me to address interim issues            Return in about 6 weeks (around 9/7/2021).

## 2021-08-17 ENCOUNTER — OFFICE VISIT (OUTPATIENT)
Dept: GASTROENTEROLOGY | Facility: CLINIC | Age: 61
End: 2021-08-17

## 2021-08-17 ENCOUNTER — LAB (OUTPATIENT)
Dept: LAB | Facility: HOSPITAL | Age: 61
End: 2021-08-17

## 2021-08-17 VITALS
DIASTOLIC BLOOD PRESSURE: 100 MMHG | TEMPERATURE: 97.1 F | HEIGHT: 74 IN | WEIGHT: 204 LBS | OXYGEN SATURATION: 99 % | SYSTOLIC BLOOD PRESSURE: 138 MMHG | HEART RATE: 58 BPM | BODY MASS INDEX: 26.18 KG/M2

## 2021-08-17 DIAGNOSIS — B18.2 CHRONIC HEPATITIS C WITHOUT HEPATIC COMA (HCC): ICD-10-CM

## 2021-08-17 DIAGNOSIS — K21.9 GASTROESOPHAGEAL REFLUX DISEASE, UNSPECIFIED WHETHER ESOPHAGITIS PRESENT: Primary | ICD-10-CM

## 2021-08-17 DIAGNOSIS — Z86.010 HX OF COLONIC POLYP: ICD-10-CM

## 2021-08-17 DIAGNOSIS — R10.11 RIGHT UPPER QUADRANT ABDOMINAL PAIN: ICD-10-CM

## 2021-08-17 DIAGNOSIS — D68.9 COAGULOPATHY (HCC): ICD-10-CM

## 2021-08-17 PROBLEM — Z86.0100 HX OF COLONIC POLYP: Status: ACTIVE | Noted: 2021-08-17

## 2021-08-17 PROCEDURE — 99214 OFFICE O/P EST MOD 30 MIN: CPT | Performed by: NURSE PRACTITIONER

## 2021-08-17 PROCEDURE — 36415 COLL VENOUS BLD VENIPUNCTURE: CPT

## 2021-08-17 PROCEDURE — 87522 HEPATITIS C REVRS TRNSCRPJ: CPT

## 2021-08-17 RX ORDER — PANTOPRAZOLE SODIUM 40 MG/1
40 TABLET, DELAYED RELEASE ORAL DAILY
Qty: 30 TABLET | Refills: 11 | Status: SHIPPED | OUTPATIENT
Start: 2021-08-17

## 2021-08-17 NOTE — PROGRESS NOTES
"[facility] GASTROENTEROLOGY - OFFICE NOTE    8/17/2021    Viktor Mendez   1960    Primary Physician: Kaz Galindo DO    Chief Complaint   Patient presents with   • Hepatitis C     treated 1990's   • Abdominal Pain     has been better-- last colon 2017 per pt in [place]   heartburn       HISTORY OF PRESENT ILLNESS:     Viktor Mendez is a 61 y.o. male presents with hepatitis c and abdominal pain.     Abdominal pain  For 1 year intermittent. Location is ruq. Described as a sharp pain. Stress can trigger. He has a lot of stress.  May or may not occur after eating.  Pepto helps. The pain has actually been better recently and he thinks it is due to stress.  No asa , nsaids, or arthritis meds. No black stools.       GERD  Manifested by heartburn for years. Occurs 2-3 days per week. Triggers by \" any food\". Has taken nexium in the past that has helped.   No caffeine. No tobacco          Hepatitis c  He was diagnosed in the 1990's. He did take treatment for hep c in the late 1990's. The treatment was a pill and injection. He did finish the treatment. He was told back then that the treatment was successful. He did have liver biopsy in the late 1990's and patient denies having cirrhosis. Rare etoh. No vaccination for hep a or b.   No jaundice. No abdominal distention. No ankle swelling.       He has bm daily. No constipation or diarrhea. No rectal bleeding. No weight loss.       He is on xarelto for 2 months for Afib. Cardiologist is Dr. Barton.       Labs 7-5-21 ( [facility]) lft normal, hgb normal, plt normal, bun normal, creat normal, albumin normal. Labs 7-29-21 amylase and lipase normal. hgb 12.4, mcv normal, lft's normal.       He has history of perforated diverticulitis with resection/colostomy with reversal.   He has seen Dr. Araujo in the past. Last colonoscopy 2016 by Dr. Araujo and had colon polyps. Was supposed to have repeat last year but did not have done. Last egd was 2016 by Dr. Araujo.   No family " history of colon cancer or colon polyps.   He also has history of gastric ulcers and history of h pylori in the past.     Past Medical History:   Diagnosis Date   • Anxiety    • Atrial fibrillation (CMS/HCC)    • DDD (degenerative disc disease), lumbar    • Essential hypertension 3/11/2021   • Glaucoma    • Hepatitis C    • Kidney cysts        Past Surgical History:   Procedure Laterality Date   • COLONOSCOPY     • COLOSTOMY     • EYE SURGERY         Outpatient Medications Marked as Taking for the 8/17/21 encounter (Office Visit) with Brenda Vera APRN   Medication Sig Dispense Refill   • busPIRone (BUSPAR) 7.5 MG tablet Take 7.5 mg by mouth Daily.     • dorzolamide-timolol (COSOPT) 22.3-6.8 MG/ML ophthalmic solution 1 drop Every Morning.     • FIBER PO Take  by mouth.     • hydrOXYzine (ATARAX) 10 MG tablet Take 10 mg by mouth Every Night.     • latanoprost (XALATAN) 0.005 % ophthalmic solution 1 drop Every Night.     • lisinopril (PRINIVIL,ZESTRIL) 10 MG tablet Take 10 mg by mouth Daily.     • metoprolol tartrate (LOPRESSOR) 25 MG tablet Take 0.5 tablets by mouth 2 (two) times a day. 60 tablet 0   • Multiple Vitamins-Minerals (MULTIVITAL PO) Take  by mouth.     • rivaroxaban (Xarelto) 20 MG tablet Take 1 tablet by mouth Daily. 30 tablet 11       No Known Allergies    Social History     Socioeconomic History   • Marital status: Single     Spouse name: Not on file   • Number of children: Not on file   • Years of education: Not on file   • Highest education level: Not on file   Tobacco Use   • Smoking status: Former Smoker     Packs/day: 2.00     Years: 30.00     Pack years: 60.00     Types: Cigarettes   • Smokeless tobacco: Never Used   • Tobacco comment: QUIT 10 YEARS AGO    Substance and Sexual Activity   • Alcohol use: Yes     Comment: occ   • Drug use: No   • Sexual activity: Defer       Family History   Problem Relation Age of Onset   • Diabetes Mother    • Colon cancer Neg Hx    • Colon polyps Neg Hx    •  "Esophageal cancer Neg Hx        Review of Systems   Constitutional: Negative for chills, fever and unexpected weight change.   Respiratory: Negative for cough, shortness of breath and wheezing.    Cardiovascular: Negative for chest pain and palpitations.   Gastrointestinal: Positive for abdominal pain. Negative for abdominal distention, anal bleeding, blood in stool, constipation, diarrhea, nausea and vomiting.        Vitals:    08/17/21 0924   BP: 138/100   Pulse: 58   Temp: 97.1 °F (36.2 °C)   SpO2: 99%   Weight: 92.5 kg (204 lb)   Height: 186.7 cm (73.5\")      Body mass index is 26.55 kg/m².    Physical Exam  Vitals reviewed.   Constitutional:       General: He is not in acute distress.  Cardiovascular:      Rate and Rhythm: Normal rate and regular rhythm.      Heart sounds: Normal heart sounds.   Pulmonary:      Effort: Pulmonary effort is normal.      Breath sounds: Normal breath sounds.   Abdominal:      General: Bowel sounds are normal. There is no distension.      Palpations: Abdomen is soft.      Tenderness: There is no abdominal tenderness.   Skin:     General: Skin is warm and dry.   Neurological:      Mental Status: He is alert.         Results for orders placed or performed during the hospital encounter of 07/05/21   Comprehensive Metabolic Panel    Specimen: Blood   Result Value Ref Range    Glucose 136 (H) 65 - 99 mg/dL    BUN 13 8 - 23 mg/dL    Creatinine 0.76 0.76 - 1.27 mg/dL    Sodium 143 136 - 145 mmol/L    Potassium 3.9 3.5 - 5.2 mmol/L    Chloride 108 (H) 98 - 107 mmol/L    CO2 26.0 22.0 - 29.0 mmol/L    Calcium 9.2 8.6 - 10.5 mg/dL    Total Protein 6.9 6.0 - 8.5 g/dL    Albumin 4.20 3.50 - 5.20 g/dL    ALT (SGPT) 16 1 - 41 U/L    AST (SGOT) 18 1 - 40 U/L    Alkaline Phosphatase 83 39 - 117 U/L    Total Bilirubin 0.7 0.0 - 1.2 mg/dL    eGFR Non African Amer 104 >60 mL/min/1.73    Globulin 2.7 gm/dL    A/G Ratio 1.6 g/dL    BUN/Creatinine Ratio 17.1 7.0 - 25.0    Anion Gap 9.0 5.0 - 15.0 mmol/L "   Troponin    Specimen: Blood   Result Value Ref Range    Troponin T <0.010 0.000 - 0.030 ng/mL   Troponin    Specimen: Blood   Result Value Ref Range    Troponin T <0.010 0.000 - 0.030 ng/mL   CBC Auto Differential    Specimen: Blood   Result Value Ref Range    WBC 6.83 3.40 - 10.80 10*3/mm3    RBC 4.23 4.14 - 5.80 10*6/mm3    Hemoglobin 13.5 13.0 - 17.7 g/dL    Hematocrit 37.0 (L) 37.5 - 51.0 %    MCV 87.5 79.0 - 97.0 fL    MCH 31.9 26.6 - 33.0 pg    MCHC 36.5 (H) 31.5 - 35.7 g/dL    RDW 13.1 12.3 - 15.4 %    RDW-SD 41.2 37.0 - 54.0 fl    MPV 10.0 6.0 - 12.0 fL    Platelets 153 140 - 450 10*3/mm3    Neutrophil % 58.5 42.7 - 76.0 %    Lymphocyte % 31.3 19.6 - 45.3 %    Monocyte % 7.2 5.0 - 12.0 %    Eosinophil % 2.3 0.3 - 6.2 %    Basophil % 0.4 0.0 - 1.5 %    Immature Grans % 0.3 0.0 - 0.5 %    Neutrophils, Absolute 3.99 1.70 - 7.00 10*3/mm3    Lymphocytes, Absolute 2.14 0.70 - 3.10 10*3/mm3    Monocytes, Absolute 0.49 0.10 - 0.90 10*3/mm3    Eosinophils, Absolute 0.16 0.00 - 0.40 10*3/mm3    Basophils, Absolute 0.03 0.00 - 0.20 10*3/mm3    Immature Grans, Absolute 0.02 0.00 - 0.05 10*3/mm3    nRBC 0.0 0.0 - 0.2 /100 WBC   ECG 12 Lead   Result Value Ref Range    QT Interval 314 ms    QTC Interval 422 ms   ECG 12 Lead   Result Value Ref Range    QT Interval 368 ms    QTC Interval 410 ms   Green Top (Gel)   Result Value Ref Range    Extra Tube Hold for add-ons.    Lavender Top   Result Value Ref Range    Extra Tube hold for add-on    Red Top   Result Value Ref Range    Extra Tube Hold for add-ons.            ASSESSMENT AND PLAN    Assessment/Plan     Diagnoses and all orders for this visit:    1. Gastroesophageal reflux disease, unspecified whether esophagitis present (Primary)    2. Right upper quadrant abdominal pain    3. Chronic hepatitis C without hepatic coma (CMS/HCC)  Comments:  treated 1990's and states treatment was successful  Orders:  -     HCV RT-PCR,Quant(Non-Graph); Future    4. Hx of colonic  polyp    5. Coagulopathy (CMS/HCC)  Comments:  xarelto for afib    Other orders  -     pantoprazole (PROTONIX) 40 MG EC tablet; Take 1 tablet by mouth Daily.  Dispense: 30 tablet; Refill: 11    In regards to GERD recommend strict anti reflux precautions. I discussed non pharmaceutical treatment of gerd.  This includes gradually losing weight to achieve ideal body wt., elevation of the head of bed by 4-6 inches, nothing to eat or drink 3 hours prior to lying down, avoiding tight clothing, stress reduction, tobacco cessation, reduction of alcohol intake, and dietary restrictions (avoiding caffeine, coffee, fatty foods, mints, chocolate, spicy foods and tomato based sauces as much as possible). Trial of protonix daily.       In regard to ruq pain, differential diagnosis discussed.  This has improved over the last 3 weeks and he thinks was due to stress. Trial of ppi.  I do recommend upper endoscopy for further evaluation and he is agreeable.  However prior to scheduling EGD I will send a letter to Dr. Barton in regards to the patient can safely hold Xarelto 24 hours prior to procedure.  Recommend ER if worsening or severe pain.    In regards to chronic hep c, check hcv rna.  He did receive treatment for hepatitis C back in the 1990s and was told that the treatment was successful.  Will check HCVRNA and contact patient with results.    In regards to history of colon polyps, he is overdue for colonoscopy.  We will send a letter to Dr. Barton in regards to the patient can safely hold Xarelto 24 hours prior to procedure.  I will contact patient once he has responded.      Will request last egd and colonoscopy with path  from Dr. Araujo.         Risk, benefits, and alternatives of endoscopy were explained in full.  They understand that there is a risk of bleeding, perforation, and infection.  The risk of perforation goes up with esophageal dilation.  Other options to evaluate UGI complaints could involve barium swallow or UGI  series, but these would be diagnostic tests only.  Patient was given time to ask questions.  I answered them to their satisfaction and they are agreeable to proceeding.     All risks, benefits, alternatives, and indications of colonoscopy procedure have been discussed with the patient. Risks to include perforation of the colon requiring possible surgery or colostomy, risk of bleeding from biopsies or removal of colon tissue, possibility of missing a colon polyp or cancer, or adverse drug reaction.  Benefits to include the diagnosis and management of disease of the colon and rectum. Alternatives to include barium enema, radiographic evaluation, lab testing or no intervention. Pt verbalizes understanding and agrees.                The patient was advised on the risks of stopping blood thinners for a procedure.  The risks discussed included the risk of developing myocardial infarction, CVA, embolus, clogging of the arteries or stents, etc.  We discussed the potential consequences of the risks discussed.  The benefits of stopping as well as the alternatives were discussed as well.   Patient is to hold their anticoagulation medication per the direction of their prescribing physician, Dr. Barton.  A letter will be sent to prescribing This is to prevent any risk or complication from bleeding intra and post procedure. If they develop bleeding post procedure they are to go the emergency department for further evaluation and treatment immediately.       Received records: 1/2017 colonoscopy by Dr. Araujo, colon polyps ( path hyperplastic, and tubular adenomatous)  extensive diverticular disease, and normal TI. EGD  1/2017 by Dr. Araujo biopsy of the esophagus, sliding hiatal hernia with irregular Z-line, biopsies taken to rule out Pearson's metaplasia, bilious gastritis, biopsy of the small bowel to rule out celiac.  Path of the small bowel biopsy was benign/no abnormality, gastric biopsy with no significant histopathological  abnormality, GE junction biopsy benign/no intestinal metaplasia.      Brenda Vera, APRN

## 2021-08-18 LAB
HCV RNA SERPL NAA+PROBE-ACNC: NORMAL IU/ML
TEST INFORMATION: NORMAL

## 2021-08-20 ENCOUNTER — TELEPHONE (OUTPATIENT)
Dept: GASTROENTEROLOGY | Facility: CLINIC | Age: 61
End: 2021-08-20

## 2021-08-23 ENCOUNTER — PREP FOR SURGERY (OUTPATIENT)
Dept: OTHER | Facility: HOSPITAL | Age: 61
End: 2021-08-23

## 2021-08-23 ENCOUNTER — TELEPHONE (OUTPATIENT)
Dept: GASTROENTEROLOGY | Facility: CLINIC | Age: 61
End: 2021-08-23

## 2021-08-23 DIAGNOSIS — K21.9 GASTROESOPHAGEAL REFLUX DISEASE, UNSPECIFIED WHETHER ESOPHAGITIS PRESENT: Primary | ICD-10-CM

## 2021-08-23 DIAGNOSIS — Z86.010 HX OF COLONIC POLYP: ICD-10-CM

## 2021-08-23 DIAGNOSIS — R10.11 RIGHT UPPER QUADRANT ABDOMINAL PAIN: ICD-10-CM

## 2021-08-23 NOTE — TELEPHONE ENCOUNTER
Please let patient know Dr. Barton approved patient to be off of xarelto 2 days prior to procedures. Risk of being off of anticoagulant discussed at office visit.     We discussed egd and colonoscopy , I will put in case request Miralax prep. Thank you         ----- Message from Carlito Barton MD sent at 8/21/2021  8:31 PM CDT -----  Acceptable cardiovascular risk of planned procedure.  Can proceed with surgery with usual caution and perioperative hemodynamic and cardiac rhythm monitoring.    Hold Xarelto x 2-3 days prior to procedure and resume when possible when no bleeding risks after procedure.      Patient and the doctor performing the procedure and requesting holding anticoagulation to be aware that   can get a stroke or other forms of cardioembolism when the patient  is off anticoagulation but willing to assume this risk       Implicit per requesting provider that the need and benefit of the recommended procedure outweighs the risk of anticoagulation cessation    No need for Lovenox           ----- Message -----  From: Brenda Vera APRN  Sent: 8/17/2021  10:18 AM CDT  To: Carlito Barton MD

## 2021-10-14 DIAGNOSIS — Z01.818 PREOPERATIVE TESTING: Primary | ICD-10-CM

## 2021-10-20 ENCOUNTER — OFFICE VISIT (OUTPATIENT)
Dept: CARDIOLOGY | Facility: CLINIC | Age: 61
End: 2021-10-20

## 2021-10-20 VITALS
BODY MASS INDEX: 27.7 KG/M2 | DIASTOLIC BLOOD PRESSURE: 80 MMHG | HEIGHT: 73 IN | OXYGEN SATURATION: 100 % | WEIGHT: 209 LBS | SYSTOLIC BLOOD PRESSURE: 130 MMHG | HEART RATE: 62 BPM

## 2021-10-20 DIAGNOSIS — Z79.01 CURRENT USE OF LONG TERM ANTICOAGULATION: ICD-10-CM

## 2021-10-20 DIAGNOSIS — R00.1 SINUS BRADYCARDIA: ICD-10-CM

## 2021-10-20 DIAGNOSIS — I48.0 PAF (PAROXYSMAL ATRIAL FIBRILLATION) (HCC): Primary | ICD-10-CM

## 2021-10-20 DIAGNOSIS — I47.1 PSVT (PAROXYSMAL SUPRAVENTRICULAR TACHYCARDIA) (HCC): ICD-10-CM

## 2021-10-20 DIAGNOSIS — I10 ESSENTIAL HYPERTENSION: Chronic | ICD-10-CM

## 2021-10-20 PROCEDURE — 99214 OFFICE O/P EST MOD 30 MIN: CPT | Performed by: NURSE PRACTITIONER

## 2021-10-20 NOTE — PROGRESS NOTES
"Chief Complaint  PSVT (8wk F/U. No palpitations. Fatigue has improved since stopping Metoprolol) and Results (Holter)    Subjective          Viktor Mendez presents to [facility] CARDIOLOGY for routine follow-up of outpatient testing. 14 day Holter monitor revealed predominantly sinus rhythm with rare supraventricular ectopic beats with APC burden of 0.7%, rare premature ventricular contractions with PVC burden less than 0.1%, lowest heart rate of 46 bpm at 9:40 PM consistent with marked sinus bradycardia possibly during sleep, 5 runs of supraventricular tachycardia with longest duration of 6 beats at a maximum rate of 124 bpm, no significant pauses and no correlated arrhythmias. He has paroxysmal atrial fibrillation on chronic anticoagulation, hypertension, colostomy, bradycardia, paroxysmal supraventricular tachycardia and hepatitis C.  Patient denies chest pain, shortness of breath, palpitations, dizziness, syncope, orthopnea, PND, edema or decreased stamina.  Patient denies any signs of bleeding.    Atrial Fibrillation  Presents for follow-up visit. Symptoms include bradycardia. Symptoms are negative for an AICD problem, chest pain, dizziness, hemodynamic instability, hypertension, hypotension, pacemaker problem, palpitations, shortness of breath, syncope, tachycardia and weakness. The symptoms have been stable. Past medical history includes atrial fibrillation. There are no medication compliance problems.   Hypertension  This is a chronic problem. The current episode started more than 1 month ago. Pertinent negatives include no anxiety, blurred vision, chest pain, headaches, malaise/fatigue, neck pain, orthopnea, palpitations, peripheral edema, PND, shortness of breath or sweats. Current antihypertension treatment includes ACE inhibitors. The current treatment provides significant improvement.       Objective   Vital Signs:   /80   Pulse 62   Ht 185.4 cm (73\")   Wt 94.8 kg (209 lb)  "  SpO2 100%   BMI 27.57 kg/m²     Vitals and nursing note reviewed.   Constitutional:       General: Awake.      Appearance: Normal and healthy appearance. Well-developed, normal weight and not in distress.   Eyes:      General: Lids are normal.      Conjunctiva/sclera: Conjunctivae normal.      Pupils: Pupils are equal, round, and reactive to light.   HENT:      Head: Normocephalic and atraumatic.      Nose: Nose normal.   Neck:      Vascular: No JVR. JVD normal.   Pulmonary:      Effort: Pulmonary effort is normal.      Breath sounds: Normal breath sounds. No wheezing. No rhonchi. No rales.   Chest:      Chest wall: Not tender to palpatation.   Cardiovascular:      PMI at left midclavicular line. Normal rate. Regular rhythm. Normal S1. Normal S2.      Murmurs: There is no murmur.      No gallop. No click. No rub.   Pulses:     Intact distal pulses.   Edema:     Peripheral edema absent.   Abdominal:      General: Bowel sounds are normal.      Palpations: Abdomen is soft.      Tenderness: There is no abdominal tenderness.   Musculoskeletal: Normal range of motion.         General: No tenderness.      Cervical back: Normal range of motion. Skin:     General: Skin is warm and dry.   Neurological:      General: No focal deficit present.      Mental Status: Alert, oriented to person, place, and time and oriented to person, place and time.   Psychiatric:         Attention and Perception: Attention and perception normal.         Mood and Affect: Mood and affect normal.         Speech: Speech normal.         Behavior: Behavior normal. Behavior is cooperative.         Thought Content: Thought content normal.         Cognition and Memory: Cognition and memory normal.         Judgment: Judgment normal.         Result Review :   The following data was reviewed by: RONI Moreau on 03/11/2021:  Common labs    Common Labsle 7/5/21 7/5/21    1205 1205   Glucose  136 (A)   BUN  13   Creatinine  0.76   eGFR Non African  Am  104   Sodium  143   Potassium  3.9   Chloride  108 (A)   Calcium  9.2   Albumin  4.20   Total Bilirubin  0.7   Alkaline Phosphatase  83   AST (SGOT)  18   ALT (SGPT)  16   WBC 6.83    Hemoglobin 13.5    Hematocrit 37.0 (A)    Platelets 153    (A) Abnormal value            Data reviewed: Cardiology studies 2D echo 9/23/20.           Assessment and Plan    Diagnoses and all orders for this visit:    1.PAF (paroxysmal atrial fibrillation) (HCC) (Primary)- following abdominal surgery for diverticulitis resulting in colostomy in [place] remotely with recent recurrence resulting in ED presentation. Anticoagulated.      2. PSVT (paroxysmal supraventricular tachycardia) (CMS/HCC)- asymptomatic.     3. Sinus bradycardia- asymptomatic.     4. Essential hypertension- borderline in office today. Management per primary care provider.  Continue lisinopril.  Monitor and record daily blood pressure. Report readings consistently higher than 140/90 or consistently lower than 100/60.     5. Current use of long term anticoagulation- pt continues on Xarelto. Denies bleeding.       Follow Up   Return in about 6 months (around 4/20/2022) for Next scheduled follow up with Dr. Barton.  Patient was given instructions and counseling regarding his condition or for health maintenance advice. Please see specific information pulled into the AVS if appropriate.

## 2021-10-22 ENCOUNTER — LAB (OUTPATIENT)
Dept: LAB | Facility: HOSPITAL | Age: 61
End: 2021-10-22

## 2021-10-22 LAB — SARS-COV-2 ORF1AB RESP QL NAA+PROBE: NOT DETECTED

## 2021-10-22 PROCEDURE — U0004 COV-19 TEST NON-CDC HGH THRU: HCPCS | Performed by: NURSE PRACTITIONER

## 2021-10-22 PROCEDURE — C9803 HOPD COVID-19 SPEC COLLECT: HCPCS | Performed by: NURSE PRACTITIONER

## 2021-10-25 ENCOUNTER — TELEPHONE (OUTPATIENT)
Dept: GASTROENTEROLOGY | Facility: CLINIC | Age: 61
End: 2021-10-25

## 2021-11-08 ENCOUNTER — TELEPHONE (OUTPATIENT)
Dept: GASTROENTEROLOGY | Facility: CLINIC | Age: 61
End: 2021-11-08

## 2022-02-17 ENCOUNTER — TELEPHONE (OUTPATIENT)
Dept: CARDIOLOGY | Facility: CLINIC | Age: 62
End: 2022-02-17

## 2022-02-17 NOTE — TELEPHONE ENCOUNTER
PATIENT HAS CALLED STATING THAT HIS COLONOSCOPY FROM LAST YEAR WAS RESCHEDULED TO 3/7/22.     PATIENT WANTED TO KNOW IF HE WAS STILL OK TO PROCEED WITH THAT PROCEDURE WHILE HOLDING HIS BLOOD THINNERS.     LOV: 10/21/21   HX: PAF, PSVT     PATIENT IS CURRENTLY TAKING XARELTO 20MG       PLEASE ADVISE

## 2022-03-01 ENCOUNTER — TELEPHONE (OUTPATIENT)
Dept: GASTROENTEROLOGY | Facility: CLINIC | Age: 62
End: 2022-03-01

## 2022-03-01 ENCOUNTER — PREP FOR SURGERY (OUTPATIENT)
Dept: OTHER | Facility: HOSPITAL | Age: 62
End: 2022-03-01

## 2022-03-01 DIAGNOSIS — R10.11 RIGHT UPPER QUADRANT ABDOMINAL PAIN: ICD-10-CM

## 2022-03-01 DIAGNOSIS — K21.9 GASTROESOPHAGEAL REFLUX DISEASE, UNSPECIFIED WHETHER ESOPHAGITIS PRESENT: Primary | ICD-10-CM

## 2022-03-01 DIAGNOSIS — Z86.010 HX OF COLONIC POLYP: ICD-10-CM

## 2022-03-01 NOTE — TELEPHONE ENCOUNTER
I saw him 8/2021 for gerd, ruq pain, history colon polyps. He was put in recall for 3/2022. Dr. Barton says ok to hold xarelto 2 days prior to procedures. Please contact patient and let him know. If he is ready then we will plan on egd and colonoscopy on the same day. Let me know so I can put in case request if he is agreeable. Thank you

## 2022-03-16 DIAGNOSIS — Z01.818 PREOPERATIVE TESTING: Primary | ICD-10-CM

## 2022-03-18 ENCOUNTER — LAB (OUTPATIENT)
Dept: LAB | Facility: HOSPITAL | Age: 62
End: 2022-03-18

## 2022-03-18 LAB — SARS-COV-2 ORF1AB RESP QL NAA+PROBE: NOT DETECTED

## 2022-03-18 PROCEDURE — U0004 COV-19 TEST NON-CDC HGH THRU: HCPCS | Performed by: NURSE PRACTITIONER

## 2022-03-18 PROCEDURE — C9803 HOPD COVID-19 SPEC COLLECT: HCPCS | Performed by: NURSE PRACTITIONER

## 2022-03-21 ENCOUNTER — HOSPITAL ENCOUNTER (OUTPATIENT)
Facility: HOSPITAL | Age: 62
Setting detail: HOSPITAL OUTPATIENT SURGERY
Discharge: HOME | End: 2022-03-21
Attending: INTERNAL MEDICINE | Admitting: INTERNAL MEDICINE

## 2022-03-21 ENCOUNTER — ANESTHESIA (OUTPATIENT)
Dept: GASTROENTEROLOGY | Facility: HOSPITAL | Age: 62
End: 2022-03-21

## 2022-03-21 ENCOUNTER — ANESTHESIA EVENT (OUTPATIENT)
Dept: GASTROENTEROLOGY | Facility: HOSPITAL | Age: 62
End: 2022-03-21

## 2022-03-21 VITALS
SYSTOLIC BLOOD PRESSURE: 121 MMHG | HEART RATE: 98 BPM | RESPIRATION RATE: 12 BRPM | TEMPERATURE: 97 F | WEIGHT: 196 LBS | OXYGEN SATURATION: 95 % | DIASTOLIC BLOOD PRESSURE: 89 MMHG | HEIGHT: 75 IN | BODY MASS INDEX: 24.37 KG/M2

## 2022-03-21 DIAGNOSIS — R10.11 RIGHT UPPER QUADRANT ABDOMINAL PAIN: ICD-10-CM

## 2022-03-21 DIAGNOSIS — K21.9 GASTROESOPHAGEAL REFLUX DISEASE, UNSPECIFIED WHETHER ESOPHAGITIS PRESENT: ICD-10-CM

## 2022-03-21 DIAGNOSIS — Z86.010 HX OF COLONIC POLYP: ICD-10-CM

## 2022-03-21 PROBLEM — Z86.19 HEPATITIS C VIRUS INFECTION CURED AFTER ANTIVIRAL DRUG THERAPY: Status: ACTIVE | Noted: 2022-03-21

## 2022-03-21 PROCEDURE — 45385 COLONOSCOPY W/LESION REMOVAL: CPT | Performed by: INTERNAL MEDICINE

## 2022-03-21 PROCEDURE — 87081 CULTURE SCREEN ONLY: CPT | Performed by: INTERNAL MEDICINE

## 2022-03-21 PROCEDURE — 88305 TISSUE EXAM BY PATHOLOGIST: CPT | Performed by: INTERNAL MEDICINE

## 2022-03-21 PROCEDURE — 25010000002 PROPOFOL 10 MG/ML EMULSION: Performed by: NURSE ANESTHETIST, CERTIFIED REGISTERED

## 2022-03-21 PROCEDURE — 43239 EGD BIOPSY SINGLE/MULTIPLE: CPT | Performed by: INTERNAL MEDICINE

## 2022-03-21 RX ORDER — LIDOCAINE HYDROCHLORIDE 10 MG/ML
0.5 INJECTION, SOLUTION EPIDURAL; INFILTRATION; INTRACAUDAL; PERINEURAL ONCE AS NEEDED
Status: DISCONTINUED | OUTPATIENT
Start: 2022-03-21 | End: 2022-03-21 | Stop reason: HOSPADM

## 2022-03-21 RX ORDER — SODIUM CHLORIDE 0.9 % (FLUSH) 0.9 %
10 SYRINGE (ML) INJECTION AS NEEDED
Status: DISCONTINUED | OUTPATIENT
Start: 2022-03-21 | End: 2022-03-21 | Stop reason: HOSPADM

## 2022-03-21 RX ORDER — ONDANSETRON 2 MG/ML
4 INJECTION INTRAMUSCULAR; INTRAVENOUS ONCE AS NEEDED
Status: DISCONTINUED | OUTPATIENT
Start: 2022-03-21 | End: 2022-03-21 | Stop reason: HOSPADM

## 2022-03-21 RX ORDER — SODIUM CHLORIDE 9 MG/ML
500 INJECTION, SOLUTION INTRAVENOUS CONTINUOUS PRN
Status: DISCONTINUED | OUTPATIENT
Start: 2022-03-21 | End: 2022-03-21 | Stop reason: HOSPADM

## 2022-03-21 RX ORDER — PROPOFOL 10 MG/ML
VIAL (ML) INTRAVENOUS AS NEEDED
Status: DISCONTINUED | OUTPATIENT
Start: 2022-03-21 | End: 2022-03-21 | Stop reason: SURG

## 2022-03-21 RX ORDER — LIDOCAINE HYDROCHLORIDE 20 MG/ML
INJECTION, SOLUTION EPIDURAL; INFILTRATION; INTRACAUDAL; PERINEURAL AS NEEDED
Status: DISCONTINUED | OUTPATIENT
Start: 2022-03-21 | End: 2022-03-21 | Stop reason: SURG

## 2022-03-21 RX ADMIN — PROPOFOL 350 MG: 10 INJECTION, EMULSION INTRAVENOUS at 11:25

## 2022-03-21 RX ADMIN — LIDOCAINE HYDROCHLORIDE 100 MG: 20 INJECTION, SOLUTION EPIDURAL; INFILTRATION; INTRACAUDAL; PERINEURAL at 11:25

## 2022-03-21 RX ADMIN — SODIUM CHLORIDE 500 ML: 9 INJECTION, SOLUTION INTRAVENOUS at 09:54

## 2022-03-21 NOTE — ANESTHESIA POSTPROCEDURE EVALUATION
"Patient: Viktor Mendez    Procedure Summary     Date: 03/21/22 Room / Location:  [facility] /  [facility]    Anesthesia Start: 1124 Anesthesia Stop: 1149    Procedures:       ESOPHAGOGASTRODUODENOSCOPY WITH ANESTHESIA (N/A )      COLONOSCOPY WITH ANESTHESIA (N/A ) Diagnosis:       Gastroesophageal reflux disease, unspecified whether esophagitis present      Right upper quadrant abdominal pain      Hx of colonic polyp      (Gastroesophageal reflux disease, unspecified whether esophagitis present [K21.9])      (Right upper quadrant abdominal pain [R10.11])      (Hx of colonic polyp [Z86.010])    Surgeons: Teja Parker MD Provider: Comfort Madsen CRNA    Anesthesia Type: MAC ASA Status: 3          Anesthesia Type: MAC    Vitals  Vitals Value Taken Time   /89 03/21/22 1206   Temp     Pulse 95 03/21/22 1207   Resp 12 03/21/22 1205   SpO2 99 % 03/21/22 1207   Vitals shown include unvalidated device data.        Post Anesthesia Care and Evaluation    Patient location during evaluation: PHASE II  Patient participation: complete - patient participated  Level of consciousness: awake and awake and alert  Pain score: 0  Pain management: adequate  Airway patency: patent  Anesthetic complications: No anesthetic complications  PONV Status: none  Cardiovascular status: acceptable  Respiratory status: acceptable  Hydration status: acceptable    Comments: Patient discharged according to acceptable Ezio score per RN assessment. See nursing records for further information.     Blood pressure 121/89, pulse 98, temperature 97 °F (36.1 °C), temperature source Temporal, resp. rate 12, height 190.5 cm (75\"), weight 88.9 kg (196 lb), SpO2 95 %.        "

## 2022-03-21 NOTE — ANESTHESIA PREPROCEDURE EVALUATION
Anesthesia Evaluation     no history of anesthetic complications:  NPO Solid Status: > 8 hours  NPO Liquid Status: > 2 hours           Airway   Mallampati: I  TM distance: >3 FB  Neck ROM: full  No difficulty expected  Dental      Pulmonary    (+) a smoker Former, shortness of breath,   Cardiovascular   Exercise tolerance: poor (<4 METS)    (+) hypertension, dysrhythmias Atrial Fib,   (-) CAD      Neuro/Psych  (-) seizures, TIA, CVA  GI/Hepatic/Renal/Endo    (+)  GERD,  hepatitis (s/p treatment) C,   (-) diabetes    Musculoskeletal     Abdominal    Substance History      OB/GYN          Other   arthritis,                    Anesthesia Plan    ASA 3     MAC     intravenous induction     Anesthetic plan, all risks, benefits, and alternatives have been provided, discussed and informed consent has been obtained with: patient.        CODE STATUS:

## 2022-03-21 NOTE — H&P
[facility] Gastroenterology  Pre Procedure History & Physical    Chief Complaint:   Colon polyps    Subjective     HPI:   The patient has a history of colon polyps who presents for exam.  He has a history of adenomatous polyps found in 2017 at an outside hospital.  He presents for surveillance colonoscopy.  He also scheduled for endoscopy exam he was having some heartburn and right upper quadrant discomfort when he was in the office in August.  He was placed on daily Protonix.  He states he has been taking that and has felt quite well since then.    Past Medical History:   Past Medical History:   Diagnosis Date   • Anxiety    • Atrial fibrillation (HCC)    • DDD (degenerative disc disease), lumbar    • Essential hypertension 03/11/2021   • GERD (gastroesophageal reflux disease)    • Glaucoma    • Hepatitis C    • Kidney cysts        Past Surgical History:  Past Surgical History:   Procedure Laterality Date   • COLONOSCOPY     • COLOSTOMY     • COLOSTOMY REVISION     • ENDOSCOPY     • EYE SURGERY         Family History:  Family History   Problem Relation Age of Onset   • Diabetes Mother    • No Known Problems Father    • Colon cancer Neg Hx    • Colon polyps Neg Hx    • Esophageal cancer Neg Hx        Social History:   reports that he has quit smoking. His smoking use included cigarettes. He has a 60.00 pack-year smoking history. He has never used smokeless tobacco. He reports current alcohol use. He reports that he does not use drugs.    Medications:   Prior to Admission medications    Medication Sig Start Date End Date Taking? Authorizing Provider   busPIRone (BUSPAR) 7.5 MG tablet Take 7.5 mg by mouth 2 (two) times a day.   Yes Giuliana Espinoza MD   dorzolamide-timolol (COSOPT) 22.3-6.8 MG/ML ophthalmic solution 1 drop Every Morning.   Yes ProviderGiuliana MD   FIBER PO Take  by mouth.   Yes Giuliana Espinoza MD   latanoprost (XALATAN) 0.005 % ophthalmic solution 1 drop Every Night.   Yes  "Provider, MD Giuliana   lisinopril (PRINIVIL,ZESTRIL) 10 MG tablet Take 5 mg by mouth Daily.   Yes ProviderGiuliana MD   pantoprazole (PROTONIX) 40 MG EC tablet Take 1 tablet by mouth Daily. 8/17/21  Yes Brenda Vera APRN   hydrOXYzine (ATARAX) 10 MG tablet Take 10 mg by mouth As Needed.    Provider, MD Giuliana   rivaroxaban (Xarelto) 20 MG tablet Take 1 tablet by mouth Daily. 7/7/21   Carlito Barton MD       Allergies:  Patient has no known allergies.    ROS:    General: Weight stable  Resp: No SOA  Cardiovascular: No CP    Objective     Blood pressure 140/90, pulse 100, temperature 97 °F (36.1 °C), temperature source Temporal, resp. rate 19, height 190.5 cm (75\"), weight 88.9 kg (196 lb), SpO2 99 %.    Physical Exam   Constitutional: Pt is oriented to person, place, and in no distress.   Cardiovascular: Normal rate, regular rhythm.    Pulmonary/Chest: Effort normal. No respiratory distress.   Abdominal:  Non-distended.  Psychiatric: Mood, memory, affect and judgment appear normal.     Assessment/Plan     Diagnosis:  Colon polyps  GERD, right upper quadrant pain, history of peptic ulcer disease  Anticipated Surgical Procedure:  Colonoscopy and endoscopy    The risks, benefits, and alternatives of this procedure have been discussed with the patient or the responsible party- the patient understands and agrees to proceed.      EMR Dragon/transcription disclaimer:  Much of this encounter note is electronic transcription/translation of spoken language to printed text.  The electronic translation of spoken language may be erroneous, or at times, nonsensical words or phrases may be inadvertently transcribed.  Although I have reviewed the note for such errors, some may still exist.  "

## 2022-03-22 LAB
CYTO UR: NORMAL
LAB AP CASE REPORT: NORMAL
PATH REPORT.FINAL DX SPEC: NORMAL
PATH REPORT.GROSS SPEC: NORMAL
UREASE TISS QL: NEGATIVE

## 2022-05-03 ENCOUNTER — OFFICE VISIT (OUTPATIENT)
Dept: CARDIOLOGY | Facility: CLINIC | Age: 62
End: 2022-05-03

## 2022-05-03 VITALS
HEART RATE: 91 BPM | OXYGEN SATURATION: 98 % | DIASTOLIC BLOOD PRESSURE: 64 MMHG | HEIGHT: 74 IN | WEIGHT: 201 LBS | BODY MASS INDEX: 25.8 KG/M2 | SYSTOLIC BLOOD PRESSURE: 122 MMHG

## 2022-05-03 DIAGNOSIS — Z79.01 CURRENT USE OF LONG TERM ANTICOAGULATION: ICD-10-CM

## 2022-05-03 DIAGNOSIS — R06.09 DOE (DYSPNEA ON EXERTION): ICD-10-CM

## 2022-05-03 DIAGNOSIS — I10 ESSENTIAL HYPERTENSION: Chronic | ICD-10-CM

## 2022-05-03 DIAGNOSIS — I47.1 PSVT (PAROXYSMAL SUPRAVENTRICULAR TACHYCARDIA): ICD-10-CM

## 2022-05-03 DIAGNOSIS — I48.0 PAF (PAROXYSMAL ATRIAL FIBRILLATION): Primary | ICD-10-CM

## 2022-05-03 PROCEDURE — 93000 ELECTROCARDIOGRAM COMPLETE: CPT | Performed by: INTERNAL MEDICINE

## 2022-05-03 PROCEDURE — 99214 OFFICE O/P EST MOD 30 MIN: CPT | Performed by: INTERNAL MEDICINE

## 2022-05-03 NOTE — PROGRESS NOTES
Vkitor Mendez  [id number]  1960  62 y.o.  male    Referring Provider: Kaz Galindo DO    Reason for Follow-up Visit:   Routine follow up.   Low risk stress test with normal LVEF    Had periop paroxysmal atrial fibrillation during abdominal surgery in [place] for diverticulitis and colostomy  sinus bradycardia  now improved after changing eye drops  Was in ER for atrial fibrillation   Started on on anticoagulation  ECG today shows atrial fibrillation with controlled ventricular rate       Subjective    Mild chronic exertional shortness of breath on exertion relieved with rest  No significant cough or wheezing    No palpitations  No associated chest pain  No significant pedal edema    No fever or chills  No significant expectoration    No hemoptysis  No presyncope or syncope    Tolerating current medications well with no untoward side effects   Compliant with prescribed medication regimen. Tries to adhere to cardiac diet.     No bleeding, excessive bruising, gait instability or fall risks    BP well controlled at home.           History of present illness:  Viktor Mendez is a 62 y.o. yo male with paroxysmal atrial fibrillation who presents today for   Chief Complaint   Patient presents with   • Follow-up   .    History  Past Medical History:   Diagnosis Date   • Anxiety    • Atrial fibrillation (HCC)    • DDD (degenerative disc disease), lumbar    • Essential hypertension 03/11/2021   • GERD (gastroesophageal reflux disease)    • Glaucoma    • Hepatitis C    • Kidney cysts    ,   Past Surgical History:   Procedure Laterality Date   • COLONOSCOPY     • COLONOSCOPY N/A 3/21/2022    Procedure: COLONOSCOPY WITH ANESTHESIA;  Surgeon: Teja Parker MD;  Location: [facility] ENDOSCOPY;  Service: Gastroenterology;  Laterality: N/A;  pre RUQ pain; hx colon polyp  post; polyps   Kaz Galindo DO   • COLOSTOMY     • COLOSTOMY REVISION     • ENDOSCOPY     • ENDOSCOPY N/A 3/21/2022    Procedure:  ESOPHAGOGASTRODUODENOSCOPY WITH ANESTHESIA;  Surgeon: Teja Parker MD;  Location: [facility] ENDOSCOPY;  Service: Gastroenterology;  Laterality: N/A;  pre GERD  post; normal   Kaz Galindo,    • EYE SURGERY     ,   Family History   Problem Relation Age of Onset   • Diabetes Mother    • No Known Problems Father    • Colon cancer Neg Hx    • Colon polyps Neg Hx    • Esophageal cancer Neg Hx    ,   Social History     Tobacco Use   • Smoking status: Former Smoker     Packs/day: 2.00     Years: 30.00     Pack years: 60.00     Types: Cigarettes   • Smokeless tobacco: Never Used   • Tobacco comment: QUIT 10 YEARS AGO    Vaping Use   • Vaping Use: Never used   Substance Use Topics   • Alcohol use: Yes     Comment: occ   • Drug use: No   ,     Medications  Current Outpatient Medications   Medication Sig Dispense Refill   • busPIRone (BUSPAR) 7.5 MG tablet Take 7.5 mg by mouth 2 (two) times a day.     • dorzolamide-timolol (COSOPT) 22.3-6.8 MG/ML ophthalmic solution 1 drop Every Morning.     • FIBER PO Take  by mouth.     • latanoprost (XALATAN) 0.005 % ophthalmic solution 1 drop Every Night.     • lisinopril (PRINIVIL,ZESTRIL) 10 MG tablet Take 5 mg by mouth Daily.     • pantoprazole (PROTONIX) 40 MG EC tablet Take 1 tablet by mouth Daily. 30 tablet 11   • rivaroxaban (Xarelto) 20 MG tablet Take 1 tablet by mouth Daily. 30 tablet 11   • hydrOXYzine (ATARAX) 10 MG tablet Take 10 mg by mouth As Needed.       No current facility-administered medications for this visit.   Holter  · Average HR: 74. Min HR: 55. Max HR: 120.     · The predominant rhythm noted during the testing period was sinus rhythm.  · Premature atrial and ventricular contractions occured rarely.  · Total ectopy burden during the monitoring period; PVCs:    9    and APCs:    91      · 5   supraventricular tachycardia episodes  longest  11     beats at maximum rate of  127      BPM      · No significant  ventricular tachy or mary arrhythmia.  · No  "correlated arrhythmia  · No significant pauses   myocardial perfusion scan    Low risk stress test with normal LVEF     Allergies:  Patient has no known allergies.    Review of Systems  Review of Systems   Constitutional: Positive for decreased appetite. Negative for malaise/fatigue.   HENT: Negative.    Eyes: Negative.    Cardiovascular: Positive for dyspnea on exertion. Negative for chest pain, claudication, cyanosis, irregular heartbeat, leg swelling, near-syncope, orthopnea, palpitations, paroxysmal nocturnal dyspnea and syncope.   Respiratory: Negative.    Endocrine: Negative.    Hematologic/Lymphatic: Negative.    Skin: Negative.    Musculoskeletal: Positive for arthritis and back pain.   Gastrointestinal: Negative for anorexia.   Genitourinary: Negative.    Neurological: Negative for weakness.   Psychiatric/Behavioral: Negative.        Objective     Physical Exam:  /64   Pulse 91   Ht 188 cm (74\")   Wt 91.2 kg (201 lb)   SpO2 98%   BMI 25.81 kg/m²   Physical Exam   Constitutional: He appears well-developed.   HENT:   Head: Normocephalic.   Neck: Normal carotid pulses and no JVD present. No tracheal tenderness present. Carotid bruit is not present. No tracheal deviation present.   Cardiovascular: Regular rhythm and normal pulses.   Murmur heard.   Systolic murmur is present with a grade of 2/6.  Pulmonary/Chest: Effort normal. No stridor.   Abdominal: Soft. He exhibits no distension. There is no abdominal tenderness. There is no rigidity.   Neurological: He is alert. No cranial nerve deficit or sensory deficit.   Skin: Skin is warm.   Psychiatric: His speech is normal and behavior is normal.       Results Review:    Results for orders placed during the hospital encounter of 09/23/20    Adult Transthoracic Echo Complete W/ Cont if Necessary Per Protocol    Interpretation Summary  · Left ventricular ejection fraction appears to be 61 - 65%. Left ventricular systolic function is normal.  · Normal " global longitudinal LV strain (GLS) = -19.2%.  · Left ventricular diastolic function was normal.  · No evidence of pulmonary hypertension is present.     myocardial perfusion scan  2017    · Myocardial perfusion imaging indicates a normal myocardial perfusion study with no evidence of ischemia.  · Left ventricular ejection fraction is normal (Calculated EF = 65%).  · Diaphragmatic attenuation and GI artifacts are present.  · Raw images reviewed with the following abnormalities noted: vertical motion artifact.  · Impressions are consistent with a low risk study.     Holter 2017    · Average HR: 74. Min HR: 55. Max HR: 120.     · The predominant rhythm noted during the testing period was sinus rhythm.  · Premature atrial and ventricular contractions occured rarely.  · Total ectopy burden during the monitoring period; PVCs:    9    and APCs:    91      · 5   supraventricular tachycardia episodes  longest  11     beats at maximum rate of  127      BPM      · No significant  ventricular tachy or mary arrhythmia.  · No correlated arrhythmia  · No significant pauses          ECG 12 Lead    Date/Time: 5/3/2022 9:50 AM  Performed by: Carlito Barton MD  Authorized by: Carlito Barton MD   Comparison: compared with previous ECG from 7/27/2021  Comparison to previous ECG: atrial fibrillation replaced  sinus rhythm   Rhythm: atrial fibrillation  Rate: normal  Conduction: conduction normal  ST Segments: ST segments normal  T Waves: T waves normal  QRS axis: normal  Other findings: non-specific ST-T wave changes    Clinical impression: abnormal EKG            Assessment/Plan   Diagnoses and all orders for this visit:    1. PAF (paroxysmal atrial fibrillation) ([facility]) (Primary)  -     Cardioversion External in Cardiology Department; Future  -     Adult Transthoracic Echo Complete w/ Color, Spectral and Contrast if necessary per protocol; Future  -     COVID-19,APTIMA PANTHER,PAD IN-HOUSE,NP/OP/NASAL SWAB IN UTM/VTM/SALINE/LIQUID PATRIZIA  TRANSPORT MEDIA/NP WASH OR ASPIRATE, 24 HR TAT - Swab, Nasal Cavity; Future    2. Current use of long term anticoagulation    3. PSVT (paroxysmal supraventricular tachycardia) (HCC)    4. CHAKRABORTY (dyspnea on exertion)  -     Adult Transthoracic Echo Complete w/ Color, Spectral and Contrast if necessary per protocol; Future    5. Essential hypertension    Other orders  -     ECG 12 Lead           Plan        Orders Placed This Encounter   Procedures   • COVID-19,APTIMA PANTHER,PAD IN-HOUSE,NP/OP/NASAL SWAB IN UTM/VTM/SALINE/LIQUID AMIES TRANSPORT MEDIA/NP WASH OR ASPIRATE, 24 HR TAT - Swab, Nasal Cavity     Standing Status:   Future     Standing Expiration Date:   5/3/2023     Order Specific Question:   Previously tested for COVID-19?     Answer:   Yes     Order Specific Question:   Employed in healthcare setting?     Answer:   No     Order Specific Question:   Symptomatic for COVID-19 as defined by CDC?     Answer:   No     Order Specific Question:   Hospitalized for COVID-19?     Answer:   No     Order Specific Question:   Admitted to ICU for COVID-19?     Answer:   No     Order Specific Question:   Resident in a congregate (group) care setting?     Answer:   No     Order Specific Question:   Release to patient     Answer:   Immediate   • Cardioversion External in Cardiology Department     Standing Status:   Future     Standing Expiration Date:   5/3/2023     Order Specific Question:   What type of sedation does the patient require?     Answer:   Monitored Anesthesia Care     Order Specific Question:   At which hospital location will this be performed?     Answer:   [facility]     Order Specific Question:   Reason for Exam:     Answer:   AF   • ECG 12 Lead     This order was created via procedure documentation     Order Specific Question:   Release to patient     Answer:   Immediate   • Adult Transthoracic Echo Complete w/ Color, Spectral and Contrast if necessary per protocol     Standing Status:   Future     Standing  Expiration Date:   5/3/2023     Order Specific Question:   Reason for exam?     Answer:   Dyspnea     Order Specific Question:   Reason for exam?     Answer:   Arrhythmia     Order Specific Question:   Release to patient     Answer:   Immediate         I support the patient's decision to take the Covid -19 vaccine   Had both dose   No major issues   Now fully immunized      Check BP and heart rates twice daily  at home and bring a recording for me to review next visit  If BP >130/85 or < 100/60 persistently over 3 reading 30 mins apart call sooner      Monitor for any signs of bleeding including red or dark stools as well as easy bruisabilty. Fall precautions.       Follow up with Dagmar TAMAYO , Jose TAMAYO  or myself            Return in about 8 weeks (around 6/28/2022).

## 2022-05-09 ENCOUNTER — LAB (OUTPATIENT)
Dept: LAB | Facility: HOSPITAL | Age: 62
End: 2022-05-09

## 2022-05-09 DIAGNOSIS — I48.0 PAF (PAROXYSMAL ATRIAL FIBRILLATION): ICD-10-CM

## 2022-05-09 LAB — SARS-COV-2 ORF1AB RESP QL NAA+PROBE: NOT DETECTED

## 2022-05-09 PROCEDURE — U0004 COV-19 TEST NON-CDC HGH THRU: HCPCS

## 2022-05-09 PROCEDURE — C9803 HOPD COVID-19 SPEC COLLECT: HCPCS

## 2022-05-11 ENCOUNTER — ANESTHESIA (OUTPATIENT)
Dept: CARDIOLOGY | Facility: HOSPITAL | Age: 62
End: 2022-05-11

## 2022-05-11 ENCOUNTER — HOSPITAL ENCOUNTER (OUTPATIENT)
Dept: CARDIOLOGY | Facility: HOSPITAL | Age: 62
Discharge: HOME | End: 2022-05-11

## 2022-05-11 ENCOUNTER — ANESTHESIA EVENT (OUTPATIENT)
Dept: CARDIOLOGY | Facility: HOSPITAL | Age: 62
End: 2022-05-11

## 2022-05-11 VITALS — SYSTOLIC BLOOD PRESSURE: 114 MMHG | OXYGEN SATURATION: 94 % | HEART RATE: 64 BPM | DIASTOLIC BLOOD PRESSURE: 93 MMHG

## 2022-05-11 VITALS
OXYGEN SATURATION: 98 % | HEART RATE: 63 BPM | RESPIRATION RATE: 15 BRPM | DIASTOLIC BLOOD PRESSURE: 82 MMHG | TEMPERATURE: 97.2 F | SYSTOLIC BLOOD PRESSURE: 119 MMHG

## 2022-05-11 DIAGNOSIS — I48.0 PAF (PAROXYSMAL ATRIAL FIBRILLATION): ICD-10-CM

## 2022-05-11 LAB
MAXIMAL PREDICTED HEART RATE: 158 BPM
STRESS TARGET HR: 134 BPM

## 2022-05-11 PROCEDURE — 92960 CARDIOVERSION ELECTRIC EXT: CPT | Performed by: INTERNAL MEDICINE

## 2022-05-11 PROCEDURE — 92960 CARDIOVERSION ELECTRIC EXT: CPT

## 2022-05-11 PROCEDURE — 25010000002 PROPOFOL 10 MG/ML EMULSION: Performed by: NURSE ANESTHETIST, CERTIFIED REGISTERED

## 2022-05-11 RX ORDER — CITALOPRAM 20 MG/1
20 TABLET ORAL DAILY
COMMUNITY

## 2022-05-11 RX ORDER — LIDOCAINE HYDROCHLORIDE 20 MG/ML
INJECTION, SOLUTION EPIDURAL; INFILTRATION; INTRACAUDAL; PERINEURAL AS NEEDED
Status: DISCONTINUED | OUTPATIENT
Start: 2022-05-11 | End: 2022-05-11 | Stop reason: SURG

## 2022-05-11 RX ORDER — SODIUM CHLORIDE 9 MG/ML
INJECTION, SOLUTION INTRAVENOUS CONTINUOUS PRN
Status: DISCONTINUED | OUTPATIENT
Start: 2022-05-11 | End: 2022-05-11 | Stop reason: SURG

## 2022-05-11 RX ADMIN — LIDOCAINE HYDROCHLORIDE 80 MG: 20 INJECTION, SOLUTION EPIDURAL; INFILTRATION; INTRACAUDAL; PERINEURAL at 14:22

## 2022-05-11 RX ADMIN — SODIUM CHLORIDE: 9 INJECTION, SOLUTION INTRAVENOUS at 14:22

## 2022-05-11 RX ADMIN — PROPOFOL 80 MCG/KG/MIN: 10 INJECTION, EMULSION INTRAVENOUS at 14:22

## 2022-05-11 NOTE — ANESTHESIA PREPROCEDURE EVALUATION
Anesthesia Evaluation     Patient summary reviewed   no history of anesthetic complications:               Airway   Mallampati: I  TM distance: >3 FB  Neck ROM: full  No difficulty expected  Dental      Pulmonary    (+) a smoker Former, shortness of breath,   Cardiovascular   Exercise tolerance: poor (<4 METS)    (+) hypertension, dysrhythmias Atrial Fib,   (-) CAD      Neuro/Psych  (-) seizures, TIA, CVA  GI/Hepatic/Renal/Endo    (+)  GERD,  hepatitis (s/p treatment) C,   (-) diabetes    Musculoskeletal     Abdominal    Substance History      OB/GYN          Other   arthritis,                        Anesthesia Plan    ASA 3     MAC   (Chart reviewed. CRNA to complete bedside evaluation, exam and R/B/A discussion prior to proceeding)          CODE STATUS:

## 2022-05-11 NOTE — ANESTHESIA POSTPROCEDURE EVALUATION
Patient: Viktor Mendez    Procedure Summary     Date: 05/11/22 Room / Location: [facility] CATH LAB    Anesthesia Start: 1416 Anesthesia Stop: 1427    Procedure: CARDIOVERSION EXTERNAL IN CARDIOLOGY DEPARTMENT Diagnosis:       PAF (paroxysmal atrial fibrillation) (HCC)      (AF)    Scheduled Providers: Carlito Barton MD Provider: Nir Loya CRNA    Anesthesia Type: MAC ASA Status: 3          Anesthesia Type: MAC    Vitals  Vitals Value Taken Time   /93 05/11/22 1426   Temp     Pulse 64 05/11/22 1427   Resp     SpO2 94 % 05/11/22 1427           Post Anesthesia Care and Evaluation    Patient location during evaluation: PACU  Patient participation: complete - patient participated  Level of consciousness: awake and alert  Pain management: adequate  Airway patency: patent  Anesthetic complications: No anesthetic complications    Cardiovascular status: acceptable  Respiratory status: acceptable  Hydration status: acceptable    Comments: Blood pressure 124/91, pulse 65, temperature 97.2 °F (36.2 °C), temperature source Temporal, resp. rate 16, SpO2 97 %.    Pt discharged from PACU based on petr score >8

## 2022-06-11 RX ORDER — RIVAROXABAN 20 MG/1
TABLET, FILM COATED ORAL
Qty: 90 TABLET | Refills: 3 | Status: SHIPPED | OUTPATIENT
Start: 2022-06-11 | End: 2022-09-27 | Stop reason: HOSPADM

## 2022-06-20 ENCOUNTER — HOSPITAL ENCOUNTER (EMERGENCY)
Facility: HOSPITAL | Age: 62
Discharge: HOME | End: 2022-06-20
Attending: STUDENT IN AN ORGANIZED HEALTH CARE EDUCATION/TRAINING PROGRAM | Admitting: STUDENT IN AN ORGANIZED HEALTH CARE EDUCATION/TRAINING PROGRAM

## 2022-06-20 ENCOUNTER — APPOINTMENT (OUTPATIENT)
Dept: CT IMAGING | Facility: HOSPITAL | Age: 62
End: 2022-06-20

## 2022-06-20 VITALS
RESPIRATION RATE: 18 BRPM | BODY MASS INDEX: 25.67 KG/M2 | WEIGHT: 200 LBS | TEMPERATURE: 98.4 F | OXYGEN SATURATION: 96 % | HEART RATE: 87 BPM | DIASTOLIC BLOOD PRESSURE: 84 MMHG | SYSTOLIC BLOOD PRESSURE: 139 MMHG | HEIGHT: 74 IN

## 2022-06-20 DIAGNOSIS — M54.41 RIGHT-SIDED LOW BACK PAIN WITH RIGHT-SIDED SCIATICA, UNSPECIFIED CHRONICITY: Primary | ICD-10-CM

## 2022-06-20 PROCEDURE — 99283 EMERGENCY DEPT VISIT LOW MDM: CPT

## 2022-06-20 PROCEDURE — 72131 CT LUMBAR SPINE W/O DYE: CPT

## 2022-06-20 PROCEDURE — 25010000002 KETOROLAC TROMETHAMINE PER 15 MG: Performed by: STUDENT IN AN ORGANIZED HEALTH CARE EDUCATION/TRAINING PROGRAM

## 2022-06-20 PROCEDURE — 96372 THER/PROPH/DIAG INJ SC/IM: CPT

## 2022-06-20 RX ORDER — KETOROLAC TROMETHAMINE 30 MG/ML
30 INJECTION, SOLUTION INTRAMUSCULAR; INTRAVENOUS ONCE
Status: COMPLETED | OUTPATIENT
Start: 2022-06-20 | End: 2022-06-20

## 2022-06-20 RX ORDER — HYDROCODONE BITARTRATE AND ACETAMINOPHEN 5; 325 MG/1; MG/1
1 TABLET ORAL ONCE
Status: COMPLETED | OUTPATIENT
Start: 2022-06-20 | End: 2022-06-20

## 2022-06-20 RX ORDER — CYCLOBENZAPRINE HCL 5 MG
5 TABLET ORAL 3 TIMES DAILY PRN
Qty: 15 TABLET | Refills: 0 | Status: SHIPPED | OUTPATIENT
Start: 2022-06-20 | End: 2022-06-25

## 2022-06-20 RX ORDER — LIDOCAINE 50 MG/G
1 PATCH TOPICAL
Status: DISCONTINUED | OUTPATIENT
Start: 2022-06-20 | End: 2022-06-20 | Stop reason: HOSPADM

## 2022-06-20 RX ORDER — HYDROCODONE BITARTRATE AND ACETAMINOPHEN 5; 325 MG/1; MG/1
1 TABLET ORAL 4 TIMES DAILY PRN
Qty: 9 TABLET | Refills: 0 | Status: SHIPPED | OUTPATIENT
Start: 2022-06-20 | End: 2022-06-23

## 2022-06-20 RX ORDER — LIDOCAINE 50 MG/G
1 PATCH TOPICAL EVERY 24 HOURS
Qty: 7 PATCH | Refills: 0 | Status: SHIPPED | OUTPATIENT
Start: 2022-06-20 | End: 2022-06-27

## 2022-06-20 RX ADMIN — KETOROLAC TROMETHAMINE 30 MG: 30 INJECTION, SOLUTION INTRAMUSCULAR; INTRAVENOUS at 20:34

## 2022-06-20 RX ADMIN — LIDOCAINE 1 PATCH: 50 PATCH CUTANEOUS at 20:35

## 2022-06-20 RX ADMIN — HYDROCODONE BITARTRATE AND ACETAMINOPHEN 1 TABLET: 5; 325 TABLET ORAL at 20:34

## 2022-06-28 ENCOUNTER — OFFICE VISIT (OUTPATIENT)
Dept: CARDIOLOGY | Facility: CLINIC | Age: 62
End: 2022-06-28

## 2022-06-28 VITALS
DIASTOLIC BLOOD PRESSURE: 78 MMHG | HEIGHT: 74 IN | HEART RATE: 87 BPM | OXYGEN SATURATION: 99 % | SYSTOLIC BLOOD PRESSURE: 152 MMHG | BODY MASS INDEX: 25.41 KG/M2 | WEIGHT: 198 LBS

## 2022-06-28 DIAGNOSIS — I10 ESSENTIAL HYPERTENSION: Chronic | ICD-10-CM

## 2022-06-28 DIAGNOSIS — K21.9 GASTROESOPHAGEAL REFLUX DISEASE, UNSPECIFIED WHETHER ESOPHAGITIS PRESENT: ICD-10-CM

## 2022-06-28 DIAGNOSIS — G89.29 CHRONIC LOW BACK PAIN WITH SCIATICA, SCIATICA LATERALITY UNSPECIFIED, UNSPECIFIED BACK PAIN LATERALITY: ICD-10-CM

## 2022-06-28 DIAGNOSIS — M54.40 CHRONIC LOW BACK PAIN WITH SCIATICA, SCIATICA LATERALITY UNSPECIFIED, UNSPECIFIED BACK PAIN LATERALITY: ICD-10-CM

## 2022-06-28 DIAGNOSIS — I48.0 PAF (PAROXYSMAL ATRIAL FIBRILLATION): ICD-10-CM

## 2022-06-28 DIAGNOSIS — R06.09 DOE (DYSPNEA ON EXERTION): Primary | ICD-10-CM

## 2022-06-28 PROCEDURE — 99214 OFFICE O/P EST MOD 30 MIN: CPT | Performed by: INTERNAL MEDICINE

## 2022-06-28 PROCEDURE — 93000 ELECTROCARDIOGRAM COMPLETE: CPT | Performed by: INTERNAL MEDICINE

## 2022-06-28 RX ORDER — CYCLOBENZAPRINE HCL 5 MG
5 TABLET ORAL 3 TIMES DAILY PRN
COMMUNITY
Start: 2022-06-25

## 2022-06-28 NOTE — PROGRESS NOTES
Viktor Mendez  [id number]  1960  62 y.o.  male    Referring Provider: Kaz Galindo DO    Reason for Follow-up Visit:   Routine follow up.   Low risk stress test with normal LVEF    Had periop paroxysmal atrial fibrillation during abdominal surgery in [place] for diverticulitis and colostomy  sinus bradycardia  now improved after changing eye drops  Was in ER for atrial fibrillation   Started on on anticoagulation  ECG today shows atrial fibrillation with controlled ventricular rate   Failed cardioversion   Now back in atrial fibrillation        Subjective      Overall feels the same   No new events or complaints since last visit   Overall the patient feels no major change from baseline symptoms   Similar symptoms as during last visit      Mild chronic exertional shortness of breath on exertion relieved with rest  No significant cough or wheezing    No palpitations  No associated chest pain  No significant pedal edema    No fever or chills  No significant expectoration    No hemoptysis  No presyncope or syncope    Tolerating current medications well with no untoward side effects   Compliant with prescribed medication regimen. Tries to adhere to cardiac diet.     No bleeding, excessive bruising, gait instability or fall risks    BP well controlled at home.       Chronic low back pain on muscle relaxants         History of present illness:  Viktor Mendez is a 62 y.o. yo male with paroxysmal atrial fibrillation who presents today for   Chief Complaint   Patient presents with   • paf     6wk- CV    .    History  Past Medical History:   Diagnosis Date   • Anxiety    • Atrial fibrillation (HCC)    • DDD (degenerative disc disease), lumbar    • Essential hypertension 03/11/2021   • GERD (gastroesophageal reflux disease)    • Glaucoma    • Hepatitis C    • Kidney cysts    ,   Past Surgical History:   Procedure Laterality Date   • COLONOSCOPY     • COLONOSCOPY N/A 3/21/2022    Procedure: COLONOSCOPY WITH  ANESTHESIA;  Surgeon: Teja Parker MD;  Location:  [facility];  Service: Gastroenterology;  Laterality: N/A;  pre RUQ pain; hx colon polyp  post; polyps   Kaz Galindo DO   • COLOSTOMY     • COLOSTOMY REVISION     • ENDOSCOPY     • ENDOSCOPY N/A 3/21/2022    Procedure: ESOPHAGOGASTRODUODENOSCOPY WITH ANESTHESIA;  Surgeon: Teja Parker MD;  Location:  [facility];  Service: Gastroenterology;  Laterality: N/A;  pre GERD  post; normal   Kaz Galindo DO   • EYE SURGERY     ,   Family History   Problem Relation Age of Onset   • Diabetes Mother    • No Known Problems Father    • Colon cancer Neg Hx    • Colon polyps Neg Hx    • Esophageal cancer Neg Hx    ,   Social History     Tobacco Use   • Smoking status: Former Smoker     Packs/day: 2.00     Years: 30.00     Pack years: 60.00     Types: Cigarettes   • Smokeless tobacco: Never Used   • Tobacco comment: QUIT 10 YEARS AGO    Vaping Use   • Vaping Use: Never used   Substance Use Topics   • Alcohol use: Yes     Comment: occ   • Drug use: No   ,     Medications  Current Outpatient Medications   Medication Sig Dispense Refill   • busPIRone (BUSPAR) 7.5 MG tablet Take 7.5 mg by mouth 2 (two) times a day.     • citalopram (CeleXA) 20 MG tablet Take 20 mg by mouth Daily.     • dorzolamide-timolol (COSOPT) 22.3-6.8 MG/ML ophthalmic solution 1 drop Every Morning.     • FIBER PO Take  by mouth.     • hydrOXYzine (ATARAX) 10 MG tablet Take 10 mg by mouth As Needed.     • latanoprost (XALATAN) 0.005 % ophthalmic solution 1 drop Every Night.     • lisinopril (PRINIVIL,ZESTRIL) 10 MG tablet Take 5 mg by mouth Daily.     • pantoprazole (PROTONIX) 40 MG EC tablet Take 1 tablet by mouth Daily. 30 tablet 11   • Xarelto 20 MG tablet Take 1 tablet by mouth once daily 90 tablet 3   • cyclobenzaprine (FLEXERIL) 5 MG tablet        No current facility-administered medications for this visit.   Holter  · Average HR: 74. Min HR: 55. Max HR: 120.    "  · The predominant rhythm noted during the testing period was sinus rhythm.  · Premature atrial and ventricular contractions occured rarely.  · Total ectopy burden during the monitoring period; PVCs:    9    and APCs:    91      · 5   supraventricular tachycardia episodes  longest  11     beats at maximum rate of  127      BPM      · No significant  ventricular tachy or mary arrhythmia.  · No correlated arrhythmia  · No significant pauses   myocardial perfusion scan    Low risk stress test with normal LVEF     Allergies:  Patient has no known allergies.    Review of Systems  Review of Systems   Constitutional: Positive for decreased appetite. Negative for malaise/fatigue.   HENT: Negative.    Eyes: Negative.    Cardiovascular: Positive for dyspnea on exertion. Negative for chest pain, claudication, cyanosis, irregular heartbeat, leg swelling, near-syncope, orthopnea, palpitations, paroxysmal nocturnal dyspnea and syncope.   Respiratory: Negative.    Endocrine: Negative.    Hematologic/Lymphatic: Negative.    Skin: Negative.    Musculoskeletal: Positive for arthritis and back pain.   Gastrointestinal: Negative for anorexia.   Genitourinary: Negative.    Neurological: Negative for weakness.   Psychiatric/Behavioral: Negative.        Objective     Physical Exam:  /78   Pulse 87   Ht 188 cm (74\")   Wt 89.8 kg (198 lb)   SpO2 99%   BMI 25.42 kg/m²   Physical Exam   Constitutional: He appears well-developed.   HENT:   Head: Normocephalic.   Neck: Normal carotid pulses and no JVD present. No tracheal tenderness present. Carotid bruit is not present. No tracheal deviation present.   Cardiovascular: Regular rhythm and normal pulses.   Murmur heard.   Systolic murmur is present with a grade of 2/6.  Pulmonary/Chest: Effort normal. No stridor.   Abdominal: Soft. He exhibits no distension. There is no abdominal tenderness. There is no rigidity.   Neurological: He is alert. No cranial nerve deficit or sensory " deficit.   Skin: Skin is warm.   Psychiatric: His speech is normal and behavior is normal.       Results Review:    Results for orders placed during the hospital encounter of 09/23/20    Adult Transthoracic Echo Complete W/ Cont if Necessary Per Protocol    Interpretation Summary  · Left ventricular ejection fraction appears to be 61 - 65%. Left ventricular systolic function is normal.  · Normal global longitudinal LV strain (GLS) = -19.2%.  · Left ventricular diastolic function was normal.  · No evidence of pulmonary hypertension is present.     myocardial perfusion scan  2017    · Myocardial perfusion imaging indicates a normal myocardial perfusion study with no evidence of ischemia.  · Left ventricular ejection fraction is normal (Calculated EF = 65%).  · Diaphragmatic attenuation and GI artifacts are present.  · Raw images reviewed with the following abnormalities noted: vertical motion artifact.  · Impressions are consistent with a low risk study.     Holter 2017    · Average HR: 74. Min HR: 55. Max HR: 120.     · The predominant rhythm noted during the testing period was sinus rhythm.  · Premature atrial and ventricular contractions occured rarely.  · Total ectopy burden during the monitoring period; PVCs:    9    and APCs:    91      · 5   supraventricular tachycardia episodes  longest  11     beats at maximum rate of  127      BPM      · No significant  ventricular tachy or mary arrhythmia.  · No correlated arrhythmia  · No significant pauses          ECG 12 Lead    Date/Time: 6/28/2022 9:27 AM  Performed by: Carlito Barton MD  Authorized by: Carlito Barton MD   Comparison: compared with previous ECG from 5/3/2022  Comparison to previous ECG: Ventricular rate changed from 78   to 87  beats per minute    Rhythm: atrial fibrillation  Rate: normal  QRS axis: normal  Other findings: non-specific ST-T wave changes    Clinical impression: abnormal EKG            Assessment & Plan   Diagnoses and all orders for this  visit:    1. CHAKRABORTY (dyspnea on exertion) (Primary)  -     Adult Transthoracic Echo Complete w/ Color, Spectral and Contrast if necessary per protocol; Future  -     Stress Test With Myocardial Perfusion (1 Day); Future    2. PAF (paroxysmal atrial fibrillation) (HCC)  -     Holter Monitor - 72 Hour Up To 15 Days; Future    3. Chronic low back pain with sciatica, sciatica laterality unspecified, unspecified back pain laterality    4. Essential hypertension    5. Gastroesophageal reflux disease, unspecified whether esophagitis present    Other orders  -     ECG 12 Lead           Plan    Patient expressed understanding  Encouraged and answered all questions   Discussed with the patient and all questioned fully answered. He will call me if any problems arise.   Discussed results of ECG   Failed cardioversion     Will get echo and myocardial perfusion scan prior to adding any other  meds   outpatient cardiac telemetry for rate control     Continue on anticoagulation          Orders Placed This Encounter   Procedures   • Stress Test With Myocardial Perfusion (1 Day)     Standing Status:   Future     Standing Expiration Date:   6/28/2023     Order Specific Question:   What stress agent will be used?     Answer:   Regadenoson (Lexiscan)     Order Specific Question:   Difficulty walking criteria?     Answer:   Musculoskeletal (hips, knees, feet, back, amputee)     Order Specific Question:   Reason for exam?     Answer:   Arrhythmia     Order Specific Question:   Release to patient     Answer:   Immediate   • Holter Monitor - 72 Hour Up To 15 Days     Standing Status:   Future     Standing Expiration Date:   6/28/2023     Order Specific Question:   Reason for exam?     Answer:   AFib     Order Specific Question:   Release to patient     Answer:   Immediate     Order Specific Question:   How many days is the patient to wear the monitor?     Answer:   14   • ECG 12 Lead     This order was created via procedure documentation      Order Specific Question:   Release to patient     Answer:   Immediate   • Adult Transthoracic Echo Complete w/ Color, Spectral and Contrast if necessary per protocol     Standing Status:   Future     Standing Expiration Date:   6/28/2023     Scheduling Instructions:      Myocardial strain to be performed during echocardiogram as long as technically feasible     Order Specific Question:   Reason for exam?     Answer:   Dyspnea     Order Specific Question:   Reason for exam?     Answer:   Arrhythmia     Order Specific Question:   Release to patient     Answer:   Immediate         I support the patient's decision to take the Covid -19 vaccine   Had both dose   No major issues   Now fully immunized      Check BP and heart rates twice daily  at home and bring a recording for me to review next visit  If BP >130/85 or < 100/60 persistently over 3 reading 30 mins apart call sooner      Monitor for any signs of bleeding including red or dark stools as well as easy bruisabilty. Fall precautions.       Follow up with Dagmar TAMAYO , Jose TAMAYO or Constance Elizondo PA-C               Return in about 3 months (around 9/28/2022).

## 2022-06-30 ENCOUNTER — HOSPITAL ENCOUNTER (OUTPATIENT)
Dept: CARDIOLOGY | Facility: HOSPITAL | Age: 62
Discharge: HOME | End: 2022-06-30

## 2022-06-30 VITALS
WEIGHT: 197.97 LBS | HEART RATE: 70 BPM | HEIGHT: 74 IN | BODY MASS INDEX: 25.41 KG/M2 | DIASTOLIC BLOOD PRESSURE: 93 MMHG | SYSTOLIC BLOOD PRESSURE: 131 MMHG

## 2022-06-30 DIAGNOSIS — R06.09 DOE (DYSPNEA ON EXERTION): ICD-10-CM

## 2022-06-30 LAB
BH CV NUCLEAR PRIOR STUDY: 3
BH CV REST NUCLEAR ISOTOPE DOSE: 10.3 MCI
BH CV STRESS BP STAGE 1: NORMAL
BH CV STRESS COMMENTS STAGE 1: NORMAL
BH CV STRESS DOSE REGADENOSON STAGE 1: 0.4
BH CV STRESS DURATION MIN STAGE 1: 0
BH CV STRESS DURATION SEC STAGE 1: 10
BH CV STRESS HR STAGE 1: 122
BH CV STRESS NUCLEAR ISOTOPE DOSE: 31.1 MCI
BH CV STRESS PROTOCOL 1: NORMAL
BH CV STRESS RECOVERY BP: NORMAL MMHG
BH CV STRESS RECOVERY HR: 96 BPM
BH CV STRESS STAGE 1: 1
LV EF NUC BP: 58 %
MAXIMAL PREDICTED HEART RATE: 158 BPM
PERCENT MAX PREDICTED HR: 77.22 %
STRESS BASELINE BP: NORMAL MMHG
STRESS BASELINE HR: 88 BPM
STRESS PERCENT HR: 91 %
STRESS POST EXERCISE DUR MIN: 0 MIN
STRESS POST EXERCISE DUR SEC: 10 SEC
STRESS POST PEAK BP: NORMAL MMHG
STRESS POST PEAK HR: 122 BPM
STRESS TARGET HR: 134 BPM

## 2022-06-30 PROCEDURE — 93018 CV STRESS TEST I&R ONLY: CPT | Performed by: INTERNAL MEDICINE

## 2022-06-30 PROCEDURE — 78452 HT MUSCLE IMAGE SPECT MULT: CPT

## 2022-06-30 PROCEDURE — 25010000002 REGADENOSON 0.4 MG/5ML SOLUTION: Performed by: INTERNAL MEDICINE

## 2022-06-30 PROCEDURE — 0 TECHNETIUM SESTAMIBI: Performed by: INTERNAL MEDICINE

## 2022-06-30 PROCEDURE — 93017 CV STRESS TEST TRACING ONLY: CPT

## 2022-06-30 PROCEDURE — A9500 TC99M SESTAMIBI: HCPCS | Performed by: INTERNAL MEDICINE

## 2022-06-30 PROCEDURE — 78452 HT MUSCLE IMAGE SPECT MULT: CPT | Performed by: INTERNAL MEDICINE

## 2022-06-30 RX ADMIN — TECHNETIUM TC 99M SESTAMIBI 1 DOSE: 1 INJECTION INTRAVENOUS at 09:24

## 2022-06-30 RX ADMIN — TECHNETIUM TC 99M SESTAMIBI 1 DOSE: 1 INJECTION INTRAVENOUS at 12:00

## 2022-06-30 RX ADMIN — REGADENOSON 0.4 MG: 0.08 INJECTION, SOLUTION INTRAVENOUS at 10:48

## 2022-07-05 ENCOUNTER — TELEPHONE (OUTPATIENT)
Dept: CARDIOLOGY | Facility: CLINIC | Age: 62
End: 2022-07-05

## 2022-07-05 NOTE — TELEPHONE ENCOUNTER
Caller: CHRISTIANO WALSH    Relationship: CHILD    Best call back number: [phone]    What is the best time to reach you: ANY    Who are you requesting to speak with (clinical staff, provider,  specific staff member): CLINICAL        What was the call regarding: PATIENT WAS TOLD TO STRESS TEST RESULTS  Do you require a callback: YES

## 2022-08-04 ENCOUNTER — TELEPHONE (OUTPATIENT)
Dept: CARDIOLOGY | Facility: CLINIC | Age: 62
End: 2022-08-04

## 2022-08-04 NOTE — TELEPHONE ENCOUNTER
Caller: NICOLASA HALL    Relationship to patient: Other    Best call back number: [phone]    Patient is needing: NICOLASA CORDOVA CALLING IN FOR ABNORMAL ZEO PATCH RESULTS . THEY  NOTIFIED THAT THEY WOULD BE UPLOADING THOSE FOR THE OFFICE TO VIEW.

## 2022-08-11 ENCOUNTER — APPOINTMENT (OUTPATIENT)
Dept: GENERAL RADIOLOGY | Facility: HOSPITAL | Age: 62
End: 2022-08-11

## 2022-08-11 ENCOUNTER — HOSPITAL ENCOUNTER (EMERGENCY)
Facility: HOSPITAL | Age: 62
Discharge: HOME | End: 2022-08-11
Attending: EMERGENCY MEDICINE | Admitting: EMERGENCY MEDICINE

## 2022-08-11 VITALS
BODY MASS INDEX: 25.45 KG/M2 | WEIGHT: 192 LBS | OXYGEN SATURATION: 99 % | RESPIRATION RATE: 18 BRPM | SYSTOLIC BLOOD PRESSURE: 132 MMHG | TEMPERATURE: 98 F | HEART RATE: 90 BPM | DIASTOLIC BLOOD PRESSURE: 80 MMHG | HEIGHT: 73 IN

## 2022-08-11 DIAGNOSIS — I48.0 PAROXYSMAL ATRIAL FIBRILLATION: Primary | ICD-10-CM

## 2022-08-11 LAB
ALBUMIN SERPL-MCNC: 4.8 G/DL (ref 3.5–5.2)
ALBUMIN/GLOB SERPL: 2 G/DL
ALP SERPL-CCNC: 66 U/L (ref 39–117)
ALT SERPL W P-5'-P-CCNC: 13 U/L (ref 1–41)
ANION GAP SERPL CALCULATED.3IONS-SCNC: 9 MMOL/L (ref 5–15)
AST SERPL-CCNC: 17 U/L (ref 1–40)
BASOPHILS # BLD AUTO: 0.01 10*3/MM3 (ref 0–0.2)
BASOPHILS NFR BLD AUTO: 0.2 % (ref 0–1.5)
BILIRUB SERPL-MCNC: 1.4 MG/DL (ref 0–1.2)
BUN SERPL-MCNC: 15 MG/DL (ref 8–23)
BUN/CREAT SERPL: 16.9 (ref 7–25)
CALCIUM SPEC-SCNC: 9.6 MG/DL (ref 8.6–10.5)
CHLORIDE SERPL-SCNC: 105 MMOL/L (ref 98–107)
CO2 SERPL-SCNC: 26 MMOL/L (ref 22–29)
CREAT SERPL-MCNC: 0.89 MG/DL (ref 0.76–1.27)
D DIMER PPP FEU-MCNC: 0.28 MCGFEU/ML (ref 0–0.5)
DEPRECATED RDW RBC AUTO: 42.6 FL (ref 37–54)
EGFRCR SERPLBLD CKD-EPI 2021: 96.9 ML/MIN/1.73
EOSINOPHIL # BLD AUTO: 0.04 10*3/MM3 (ref 0–0.4)
EOSINOPHIL NFR BLD AUTO: 0.6 % (ref 0.3–6.2)
ERYTHROCYTE [DISTWIDTH] IN BLOOD BY AUTOMATED COUNT: 13.1 % (ref 12.3–15.4)
GLOBULIN UR ELPH-MCNC: 2.4 GM/DL
GLUCOSE SERPL-MCNC: 94 MG/DL (ref 65–99)
HCT VFR BLD AUTO: 37.4 % (ref 37.5–51)
HGB BLD-MCNC: 13.2 G/DL (ref 13–17.7)
HOLD SPECIMEN: NORMAL
HOLD SPECIMEN: NORMAL
IMM GRANULOCYTES # BLD AUTO: 0.01 10*3/MM3 (ref 0–0.05)
IMM GRANULOCYTES NFR BLD AUTO: 0.2 % (ref 0–0.5)
LYMPHOCYTES # BLD AUTO: 2.07 10*3/MM3 (ref 0.7–3.1)
LYMPHOCYTES NFR BLD AUTO: 33 % (ref 19.6–45.3)
MCH RBC QN AUTO: 32.2 PG (ref 26.6–33)
MCHC RBC AUTO-ENTMCNC: 35.3 G/DL (ref 31.5–35.7)
MCV RBC AUTO: 91.2 FL (ref 79–97)
MONOCYTES # BLD AUTO: 0.48 10*3/MM3 (ref 0.1–0.9)
MONOCYTES NFR BLD AUTO: 7.6 % (ref 5–12)
NEUTROPHILS NFR BLD AUTO: 3.67 10*3/MM3 (ref 1.7–7)
NEUTROPHILS NFR BLD AUTO: 58.4 % (ref 42.7–76)
NRBC BLD AUTO-RTO: 0 /100 WBC (ref 0–0.2)
NT-PROBNP SERPL-MCNC: 1112 PG/ML (ref 0–900)
PLATELET # BLD AUTO: 196 10*3/MM3 (ref 140–450)
PMV BLD AUTO: 9.7 FL (ref 6–12)
POTASSIUM SERPL-SCNC: 4.3 MMOL/L (ref 3.5–5.2)
PROT SERPL-MCNC: 7.2 G/DL (ref 6–8.5)
RBC # BLD AUTO: 4.1 10*6/MM3 (ref 4.14–5.8)
SARS-COV-2 RNA PNL SPEC NAA+PROBE: NOT DETECTED
SODIUM SERPL-SCNC: 140 MMOL/L (ref 136–145)
TROPONIN T SERPL-MCNC: <0.01 NG/ML (ref 0–0.03)
TROPONIN T SERPL-MCNC: <0.01 NG/ML (ref 0–0.03)
WBC NRBC COR # BLD: 6.28 10*3/MM3 (ref 3.4–10.8)
WHOLE BLOOD HOLD COAG: NORMAL
WHOLE BLOOD HOLD SPECIMEN: NORMAL

## 2022-08-11 PROCEDURE — 85025 COMPLETE CBC W/AUTO DIFF WBC: CPT | Performed by: EMERGENCY MEDICINE

## 2022-08-11 PROCEDURE — 96375 TX/PRO/DX INJ NEW DRUG ADDON: CPT

## 2022-08-11 PROCEDURE — 96365 THER/PROPH/DIAG IV INF INIT: CPT

## 2022-08-11 PROCEDURE — 84484 ASSAY OF TROPONIN QUANT: CPT | Performed by: EMERGENCY MEDICINE

## 2022-08-11 PROCEDURE — 71045 X-RAY EXAM CHEST 1 VIEW: CPT

## 2022-08-11 PROCEDURE — 36415 COLL VENOUS BLD VENIPUNCTURE: CPT

## 2022-08-11 PROCEDURE — 93005 ELECTROCARDIOGRAM TRACING: CPT | Performed by: EMERGENCY MEDICINE

## 2022-08-11 PROCEDURE — 25010000002 AMIODARONE IN DEXTROSE 5% 150-4.21 MG/100ML-% SOLUTION: Performed by: EMERGENCY MEDICINE

## 2022-08-11 PROCEDURE — 25010000002 MAGNESIUM SULFATE 2 GM/50ML SOLUTION: Performed by: EMERGENCY MEDICINE

## 2022-08-11 PROCEDURE — 83880 ASSAY OF NATRIURETIC PEPTIDE: CPT | Performed by: EMERGENCY MEDICINE

## 2022-08-11 PROCEDURE — 80053 COMPREHEN METABOLIC PANEL: CPT | Performed by: EMERGENCY MEDICINE

## 2022-08-11 PROCEDURE — 85379 FIBRIN DEGRADATION QUANT: CPT | Performed by: EMERGENCY MEDICINE

## 2022-08-11 PROCEDURE — 93010 ELECTROCARDIOGRAM REPORT: CPT | Performed by: INTERNAL MEDICINE

## 2022-08-11 PROCEDURE — 87635 SARS-COV-2 COVID-19 AMP PRB: CPT | Performed by: EMERGENCY MEDICINE

## 2022-08-11 PROCEDURE — 99284 EMERGENCY DEPT VISIT MOD MDM: CPT

## 2022-08-11 RX ORDER — MAGNESIUM SULFATE HEPTAHYDRATE 40 MG/ML
2 INJECTION, SOLUTION INTRAVENOUS ONCE
Status: COMPLETED | OUTPATIENT
Start: 2022-08-11 | End: 2022-08-11

## 2022-08-11 RX ORDER — ASPIRIN 81 MG/1
324 TABLET, CHEWABLE ORAL ONCE
Status: COMPLETED | OUTPATIENT
Start: 2022-08-11 | End: 2022-08-11

## 2022-08-11 RX ORDER — SODIUM CHLORIDE 0.9 % (FLUSH) 0.9 %
10 SYRINGE (ML) INJECTION AS NEEDED
Status: DISCONTINUED | OUTPATIENT
Start: 2022-08-11 | End: 2022-08-11 | Stop reason: HOSPADM

## 2022-08-11 RX ADMIN — ASPIRIN 324 MG: 81 TABLET, CHEWABLE ORAL at 12:05

## 2022-08-11 RX ADMIN — AMIODARONE HYDROCHLORIDE 150 MG: 1.5 INJECTION, SOLUTION INTRAVENOUS at 12:40

## 2022-08-11 RX ADMIN — MAGNESIUM SULFATE HEPTAHYDRATE 2 G: 2 INJECTION, SOLUTION INTRAVENOUS at 12:37

## 2022-08-11 NOTE — ED PROVIDER NOTES
Subjective   Patient is 62-year-old who came the ER complaining of shortness of breath and palpitations got history of paroxysmal A. Fib.  He does not remember how long he has been A. fib came to the ER with a slightly increased rate.      Shortness of Breath  Severity:  Moderate  Onset quality:  Gradual  Timing:  Constant  Progression:  Worsening  Chronicity:  New  Context: not activity, not animal exposure, not emotional upset, not occupational exposure and not strong odors    Relieved by:  Nothing  Worsened by:  Nothing  Ineffective treatments:  None tried  Associated symptoms: no abdominal pain, no chest pain, no claudication, no cough, no diaphoresis, no headaches, no hemoptysis, no neck pain, no PND, no sputum production, no syncope, no swollen glands and no vomiting    Risk factors: no recent alcohol use, no family hx of DVT and no obesity    Palpitations  Associated symptoms: shortness of breath    Associated symptoms: no back pain, no chest pain, no cough, no diaphoresis, no hemoptysis, no nausea, no numbness, no PND, no syncope, no vomiting and no weakness        Review of Systems   Constitutional: Negative.  Negative for diaphoresis.   HENT: Negative.    Respiratory: Positive for shortness of breath. Negative for cough, hemoptysis and sputum production.    Cardiovascular: Positive for palpitations. Negative for chest pain, claudication, syncope and PND.   Gastrointestinal: Negative.  Negative for abdominal distention, abdominal pain, nausea and vomiting.   Endocrine: Negative.    Genitourinary: Negative.    Musculoskeletal: Negative.  Negative for back pain and neck pain.   Skin: Negative for color change and pallor.   Neurological: Negative.  Negative for syncope, weakness, light-headedness, numbness and headaches.   Hematological: Negative.  Does not bruise/bleed easily.   All other systems reviewed and are negative.      Past Medical History:   Diagnosis Date   • Anxiety    • Atrial fibrillation (HCC)     • DDD (degenerative disc disease), lumbar    • Essential hypertension 03/11/2021   • GERD (gastroesophageal reflux disease)    • Glaucoma    • Hepatitis C    • Kidney cysts        No Known Allergies    Past Surgical History:   Procedure Laterality Date   • COLONOSCOPY     • COLONOSCOPY N/A 3/21/2022    Procedure: COLONOSCOPY WITH ANESTHESIA;  Surgeon: Teja Parker MD;  Location: [facility];  Service: Gastroenterology;  Laterality: N/A;  pre RUQ pain; hx colon polyp  post; polyps   Kaz Galindo DO   • COLOSTOMY     • COLOSTOMY REVISION     • ENDOSCOPY     • ENDOSCOPY N/A 3/21/2022    Procedure: ESOPHAGOGASTRODUODENOSCOPY WITH ANESTHESIA;  Surgeon: Teja Parker MD;  Location:  [facility];  Service: Gastroenterology;  Laterality: N/A;  pre GERD  post; normal   Kaz Galindo DO   • EYE SURGERY         Family History   Problem Relation Age of Onset   • Diabetes Mother    • No Known Problems Father    • Colon cancer Neg Hx    • Colon polyps Neg Hx    • Esophageal cancer Neg Hx        Social History     Socioeconomic History   • Marital status:    Tobacco Use   • Smoking status: Former Smoker     Packs/day: 2.00     Years: 30.00     Pack years: 60.00     Types: Cigarettes   • Smokeless tobacco: Never Used   • Tobacco comment: QUIT 10 YEARS AGO    Vaping Use   • Vaping Use: Never used   Substance and Sexual Activity   • Alcohol use: Yes     Comment: occ   • Drug use: Yes     Types: Marijuana   • Sexual activity: Defer           Objective   Physical Exam  Vitals and nursing note reviewed. Exam conducted with a chaperone present.   Constitutional:       General: He is not in acute distress.     Appearance: Normal appearance. He is well-developed. He is not toxic-appearing.   HENT:      Head: Normocephalic and atraumatic.      Nose: Nose normal.      Mouth/Throat:      Mouth: Mucous membranes are moist.      Pharynx: Uvula midline.   Eyes:      General: Lids are normal.  Lids are everted, no foreign bodies appreciated.      Conjunctiva/sclera: Conjunctivae normal.      Pupils: Pupils are equal, round, and reactive to light.   Neck:      Vascular: Normal carotid pulses. No carotid bruit or JVD.      Trachea: Trachea and phonation normal. No tracheal deviation.   Cardiovascular:      Rate and Rhythm: Tachycardia present. Rhythm irregular.      Chest Wall: PMI is not displaced.      Pulses: Normal pulses.      Heart sounds: Normal heart sounds.     No gallop.   Pulmonary:      Effort: Pulmonary effort is normal. No tachypnea, accessory muscle usage or respiratory distress.      Breath sounds: Normal breath sounds. No stridor. No decreased breath sounds, wheezing, rhonchi or rales.   Abdominal:      General: Bowel sounds are normal. There is no distension.      Palpations: Abdomen is soft.      Tenderness: There is no abdominal tenderness.   Musculoskeletal:         General: No swelling. Normal range of motion.      Cervical back: Full passive range of motion without pain, normal range of motion and neck supple. No rigidity.      Right lower leg: No edema.      Left lower leg: No edema.      Comments: Lower extremity exam bilaterally is unremarkable.  There is no right or left calf tenderness .  There is no palpable venous cord.  No obvious difference in the size of the legs.  No pitting edema.  The dorsalis pedis and posterior tibial femoral and popliteal pulses are palpable and +2 bilaterally.  Homans sign is negative   Skin:     General: Skin is warm and dry.      Capillary Refill: Capillary refill takes less than 2 seconds.      Coloration: Skin is not jaundiced or pale.      Nails: There is no clubbing.   Neurological:      General: No focal deficit present.      Mental Status: He is alert and oriented to person, place, and time.      GCS: GCS eye subscore is 4. GCS verbal subscore is 5. GCS motor subscore is 6.      Cranial Nerves: Cranial nerves are intact. No cranial nerve  deficit.      Motor: Motor function is intact.      Gait: Gait normal.      Deep Tendon Reflexes: Reflexes are normal and symmetric. Reflexes normal.   Psychiatric:         Speech: Speech normal.         Behavior: Behavior normal.         Procedures           ED Course  ED Course as of 08/11/22 1626   Thu Aug 11, 2022   1152 A. fib [TS]   1254 Years Algorithm for Pulmonary Embolus  To be used in hemodynamically stable patient >18 years old    No signs of DVT are present, there is no hemoptysis, PE is not the most likely diagnosis and the D-dimer is not greater or equal to 1000 ng/mL. Therefore PE is excluded . The Years algorithm rules out PE (0.43 % with symptomatic VTE during 3-month follow-up) [TS]   1422 Atrial fibrillation [TS]   1608 Patient denies chest pain [TS]   1608 No clinical evidence of CHF elevated BNP is probably rate related with amiodarone and magnesium the rate has gone down to 86 still A. fib I have discussed with him he has been on anticoagulation I can go ahead and cardiovert him.  The patient does not want to do that he states that he feels much better with the rate being down and wants to go home and follow-up with his cardiologist he has been advised return the ER for any worsening symptoms or if he changes his mind. [TS]   1609 This patient presents with shortness of breath patient arrived hemodynamically stable was placed on the monitor and IV access obtained EKG was obtained and did not reveal any malignant/unstable dysrhythmias any acute ST elevation, no evidence of Brugada, or significantly prolonged QT .  Presentation not consistent with other acute, emergent cause of shortness of breath at this time.  Low suspicion for acute PE is low risk per Wells and Years criteria and no evidence of DVT such as calf swelling, tenderness, palpable tortuous lower extremity vein, or Homans' sign.  Low suspicion for pneumothorax as the patient is saturating well and has no radiographic evidence of a  pneumothorax.  Low suspicion for dissection there is no widened mediastinum, hypotension, pulses deficit, and no tearing back/abdominal pain.  Low suspicion for tamponade as there is no JVD, muffled heart sounds, electrical alternans on EKG, and no hypotension.  Low suspicion for pericarditis as there is no diffuse ST elevation or CA depression and the patient is afebrile.  No evidence of GI bleed per patient's history.  Low suspicion for CHF exacerbation as there is no evidence of volume overload and no evidence of severely elevated BNP.  Low suspicion for pneumonia since the patient has no fever no productive cough no chest x-ray findings of pneumonia and no leukocytosis.  [TS]   1609 Risks and benefits of treatments given and alternative treatment options discussed with patient/family. I answered all the questions in simple, plain language, and there was voiced understanding and agreement with plan of care. There were no further questions. Differential diagnosis discussed. Patient/family was advised that the practice of medicine is not always an exact science, and sometimes tests, physical exam, or history may not show the underlying conditions with certainty. Additionally, the condition may change or show itself later after initial presentation. There was also expressed understanding and agreement with this limitation of emergency medicine practice. Patient/family was asked to return to ED if any problem or issues or if condition worsens or does not improved. Patient/family agreed to follow up with PCP/specialist as advised, or return to ED if unable to see a provider in a timely fashion for continued symptoms.  [TS]   1610 Patient is not on any rate limiting drug as present no we will discuss with cardiology and see what they want to do. [TS]   1625 Discussed with Dr. Celeste the patient will be placed on metoprolol and discharged home [TS]      ED Course User Index  [TS] Tuan Langston MD                                            MDM  Number of Diagnoses or Management Options  Diagnosis management comments: Differential Diagnosis:  I considered pulmonary etiology, asthma, chronic obstructive pulmonary disease, pneumonia, pulmonary embolism, adult respiratory distress syndrome, pneumothorax, pleural effusion, pulmonary fibrosis, cardiac etiology, congestive heart failure, myocardial infarction, metabolic etiology, diabetic ketoacidosis, uremia, acidosis, sepsis, anemia, drug related etiology, hyperventilation and CNS disease as a possible cause of dyspnea in this patient. This is a partial list of diagnoses considered.            Amount and/or Complexity of Data Reviewed  Clinical lab tests: ordered and reviewed  Tests in the radiology section of CPT®: reviewed and ordered  Tests in the medicine section of CPT®: reviewed and ordered    Risk of Complications, Morbidity, and/or Mortality  Presenting problems: moderate  Diagnostic procedures: moderate  Management options: moderate  General comments: Wells score is 0        Final diagnoses:   Paroxysmal atrial fibrillation (HCC)       ED Disposition  ED Disposition     ED Disposition   Discharge    Condition   Stable    Comment   --             Kaz Galindo DO  [address] [facility] [address]  [phone]          Carlito Barton MD  [address] [place] [address]  [phone]    Schedule an appointment as soon as possible for a visit            Medication List      New Prescriptions    metoprolol tartrate 25 MG tablet  Commonly known as: LOPRESSOR  Take 1 tablet by mouth Daily for 20 days.           Where to Get Your Medications      These medications were sent to [facility] [place], [place] - [address] [facility] - [phone]  - [phone]   [address] [place] [address]    Phone: [phone]   · metoprolol tartrate 25 MG tablet          Tuan Langston MD  08/11/22 7864

## 2022-08-12 ENCOUNTER — TELEPHONE (OUTPATIENT)
Dept: CARDIOLOGY | Facility: CLINIC | Age: 62
End: 2022-08-12

## 2022-08-12 DIAGNOSIS — I48.0 PAF (PAROXYSMAL ATRIAL FIBRILLATION): Primary | ICD-10-CM

## 2022-08-12 LAB
QT INTERVAL: 332 MS
QT INTERVAL: 384 MS
QTC INTERVAL: 426 MS
QTC INTERVAL: 438 MS

## 2022-08-12 NOTE — TELEPHONE ENCOUNTER
AFTER DISCUSSING WITH  PATIENT HAS BEEN ON HIS ANTICOAGULANT FOR MORE THE 30 DAYS  CONSECUTIVELY WITH NO INTERRUPTIONS.    HE HAS PLACED A CARDIOVERSION ORDER.

## 2022-08-14 ENCOUNTER — ANESTHESIA EVENT (OUTPATIENT)
Dept: CARDIOLOGY | Facility: HOSPITAL | Age: 62
End: 2022-08-14

## 2022-08-15 ENCOUNTER — LAB (OUTPATIENT)
Dept: LAB | Facility: HOSPITAL | Age: 62
End: 2022-08-15

## 2022-08-15 ENCOUNTER — HOSPITAL ENCOUNTER (OUTPATIENT)
Dept: CARDIOLOGY | Facility: HOSPITAL | Age: 62
Discharge: HOME | End: 2022-08-15

## 2022-08-15 ENCOUNTER — ANESTHESIA (OUTPATIENT)
Dept: CARDIOLOGY | Facility: HOSPITAL | Age: 62
End: 2022-08-15

## 2022-08-15 VITALS — SYSTOLIC BLOOD PRESSURE: 96 MMHG | HEART RATE: 110 BPM | OXYGEN SATURATION: 96 % | DIASTOLIC BLOOD PRESSURE: 65 MMHG

## 2022-08-15 VITALS
OXYGEN SATURATION: 100 % | SYSTOLIC BLOOD PRESSURE: 111 MMHG | HEART RATE: 58 BPM | DIASTOLIC BLOOD PRESSURE: 80 MMHG | RESPIRATION RATE: 16 BRPM

## 2022-08-15 DIAGNOSIS — I48.0 PAROXYSMAL ATRIAL FIBRILLATION: ICD-10-CM

## 2022-08-15 DIAGNOSIS — I48.0 PAF (PAROXYSMAL ATRIAL FIBRILLATION): ICD-10-CM

## 2022-08-15 DIAGNOSIS — I48.0 PAROXYSMAL ATRIAL FIBRILLATION: Primary | ICD-10-CM

## 2022-08-15 LAB
MAXIMAL PREDICTED HEART RATE: 158 BPM
SARS-COV-2 RNA PNL SPEC NAA+PROBE: NOT DETECTED
STRESS TARGET HR: 134 BPM

## 2022-08-15 PROCEDURE — 92960 CARDIOVERSION ELECTRIC EXT: CPT

## 2022-08-15 PROCEDURE — C9803 HOPD COVID-19 SPEC COLLECT: HCPCS

## 2022-08-15 PROCEDURE — 25010000002 PROPOFOL 10 MG/ML EMULSION: Performed by: NURSE ANESTHETIST, CERTIFIED REGISTERED

## 2022-08-15 PROCEDURE — 93005 ELECTROCARDIOGRAM TRACING: CPT | Performed by: INTERNAL MEDICINE

## 2022-08-15 PROCEDURE — 87635 SARS-COV-2 COVID-19 AMP PRB: CPT

## 2022-08-15 PROCEDURE — 92960 CARDIOVERSION ELECTRIC EXT: CPT | Performed by: INTERNAL MEDICINE

## 2022-08-15 RX ORDER — PROPOFOL 10 MG/ML
VIAL (ML) INTRAVENOUS AS NEEDED
Status: DISCONTINUED | OUTPATIENT
Start: 2022-08-15 | End: 2022-08-15 | Stop reason: SURG

## 2022-08-15 RX ORDER — LIDOCAINE HYDROCHLORIDE 20 MG/ML
INJECTION, SOLUTION EPIDURAL; INFILTRATION; INTRACAUDAL; PERINEURAL AS NEEDED
Status: DISCONTINUED | OUTPATIENT
Start: 2022-08-15 | End: 2022-08-15 | Stop reason: SURG

## 2022-08-15 RX ORDER — SODIUM CHLORIDE 0.9 % (FLUSH) 0.9 %
10 SYRINGE (ML) INJECTION EVERY 12 HOURS SCHEDULED
Status: DISCONTINUED | OUTPATIENT
Start: 2022-08-15 | End: 2022-08-16 | Stop reason: HOSPADM

## 2022-08-15 RX ORDER — SODIUM CHLORIDE 9 MG/ML
50 INJECTION, SOLUTION INTRAVENOUS CONTINUOUS
Status: DISCONTINUED | OUTPATIENT
Start: 2022-08-15 | End: 2022-08-16 | Stop reason: HOSPADM

## 2022-08-15 RX ORDER — SODIUM CHLORIDE 0.9 % (FLUSH) 0.9 %
10 SYRINGE (ML) INJECTION AS NEEDED
Status: DISCONTINUED | OUTPATIENT
Start: 2022-08-15 | End: 2022-08-16 | Stop reason: HOSPADM

## 2022-08-15 RX ADMIN — PROPOFOL 70 MG: 10 INJECTION, EMULSION INTRAVENOUS at 12:51

## 2022-08-15 RX ADMIN — LIDOCAINE HYDROCHLORIDE 80 MG: 20 INJECTION, SOLUTION EPIDURAL; INFILTRATION; INTRACAUDAL; PERINEURAL at 12:51

## 2022-08-15 NOTE — ANESTHESIA POSTPROCEDURE EVALUATION
Patient: Viktor Mendez    Procedure Summary     Date: 08/15/22 Room / Location: [facility] CATH LAB    Anesthesia Start: 1246 Anesthesia Stop: 1258    Procedure: CARDIOVERSION EXTERNAL IN CARDIOLOGY DEPARTMENT Diagnosis:       PAF (paroxysmal atrial fibrillation) (HCC)      (af)    Scheduled Providers: Carlito Barton MD Provider: Nir Loya CRNA    Anesthesia Type: MAC ASA Status: 3          Anesthesia Type: MAC    Vitals  Vitals Value Taken Time   /77 08/15/22 1311   Temp     Pulse 67 08/15/22 1317   Resp     SpO2 99 % 08/15/22 1317   Vitals shown include unvalidated device data.        Post Anesthesia Care and Evaluation    Patient location during evaluation: PHASE II  Patient participation: complete - patient participated  Level of consciousness: awake and alert  Pain management: adequate    Airway patency: patent  Anesthetic complications: No anesthetic complications    Cardiovascular status: acceptable  Respiratory status: acceptable  Hydration status: acceptable    Comments: There were no vitals taken for this visit.    Pt discharged from PACU based on petr score >8

## 2022-08-19 LAB
QT INTERVAL: 434 MS
QTC INTERVAL: 436 MS

## 2022-08-25 ENCOUNTER — HOSPITAL ENCOUNTER (OUTPATIENT)
Dept: CARDIOLOGY | Facility: HOSPITAL | Age: 62
Discharge: HOME | End: 2022-08-25
Admitting: INTERNAL MEDICINE

## 2022-08-25 VITALS
SYSTOLIC BLOOD PRESSURE: 111 MMHG | DIASTOLIC BLOOD PRESSURE: 80 MMHG | HEIGHT: 73 IN | BODY MASS INDEX: 25.45 KG/M2 | WEIGHT: 192 LBS

## 2022-08-25 DIAGNOSIS — R06.09 DOE (DYSPNEA ON EXERTION): ICD-10-CM

## 2022-08-25 PROCEDURE — 93306 TTE W/DOPPLER COMPLETE: CPT | Performed by: INTERNAL MEDICINE

## 2022-08-25 PROCEDURE — 93356 MYOCRD STRAIN IMG SPCKL TRCK: CPT

## 2022-08-25 PROCEDURE — 93306 TTE W/DOPPLER COMPLETE: CPT

## 2022-08-25 PROCEDURE — 93356 MYOCRD STRAIN IMG SPCKL TRCK: CPT | Performed by: INTERNAL MEDICINE

## 2022-08-30 ENCOUNTER — OFFICE VISIT (OUTPATIENT)
Dept: UROLOGY | Facility: CLINIC | Age: 62
End: 2022-08-30

## 2022-08-30 VITALS — HEIGHT: 74 IN | BODY MASS INDEX: 25.67 KG/M2 | WEIGHT: 200 LBS

## 2022-08-30 DIAGNOSIS — R31.0 GROSS HEMATURIA: ICD-10-CM

## 2022-08-30 DIAGNOSIS — N30.00 ACUTE CYSTITIS WITHOUT HEMATURIA: Primary | ICD-10-CM

## 2022-08-30 LAB
BILIRUB BLD-MCNC: NEGATIVE MG/DL
CLARITY, POC: CLEAR
COLOR UR: YELLOW
GLUCOSE UR STRIP-MCNC: NEGATIVE MG/DL
KETONES UR QL: NEGATIVE
LEUKOCYTE EST, POC: NEGATIVE
NITRITE UR-MCNC: NEGATIVE MG/ML
PH UR: 7 [PH] (ref 5–8)
PROT UR STRIP-MCNC: NEGATIVE MG/DL
RBC # UR STRIP: NEGATIVE /UL
SP GR UR: 1.02 (ref 1–1.03)
UROBILINOGEN UR QL: NORMAL

## 2022-08-30 PROCEDURE — 99214 OFFICE O/P EST MOD 30 MIN: CPT

## 2022-08-30 PROCEDURE — 88112 CYTOPATH CELL ENHANCE TECH: CPT

## 2022-09-01 ENCOUNTER — OFFICE VISIT (OUTPATIENT)
Dept: CARDIOLOGY | Facility: CLINIC | Age: 62
End: 2022-09-01

## 2022-09-01 VITALS
OXYGEN SATURATION: 97 % | WEIGHT: 200 LBS | SYSTOLIC BLOOD PRESSURE: 124 MMHG | DIASTOLIC BLOOD PRESSURE: 80 MMHG | HEIGHT: 74 IN | HEART RATE: 59 BPM | BODY MASS INDEX: 25.67 KG/M2

## 2022-09-01 DIAGNOSIS — I47.1 PSVT (PAROXYSMAL SUPRAVENTRICULAR TACHYCARDIA): ICD-10-CM

## 2022-09-01 DIAGNOSIS — I10 ESSENTIAL HYPERTENSION: ICD-10-CM

## 2022-09-01 DIAGNOSIS — I48.0 PAROXYSMAL ATRIAL FIBRILLATION: Primary | ICD-10-CM

## 2022-09-01 DIAGNOSIS — Z79.01 CURRENT USE OF LONG TERM ANTICOAGULATION: ICD-10-CM

## 2022-09-01 DIAGNOSIS — R00.1 SINUS BRADYCARDIA: ICD-10-CM

## 2022-09-01 PROCEDURE — 99214 OFFICE O/P EST MOD 30 MIN: CPT | Performed by: NURSE PRACTITIONER

## 2022-09-01 PROCEDURE — 93000 ELECTROCARDIOGRAM COMPLETE: CPT | Performed by: NURSE PRACTITIONER

## 2022-09-01 NOTE — PROGRESS NOTES
Subjective:     Encounter Date: 09/01/2022      Patient ID: Viktor Mendez is a 62 y.o. male with paroxysmal atrial fibrillation on chronic anticoagulation, hypertension, colostomy, bradycardia, paroxysmal supraventricular tachycardia and hepatitis C    Chief Complaint:  Atrial Fibrillation  Presents for follow-up visit. Symptoms are negative for bradycardia, chest pain, dizziness, hemodynamic instability, hypertension, hypotension, palpitations, shortness of breath, syncope, tachycardia and weakness. The symptoms have been stable. Past medical history includes atrial fibrillation. There are no medication compliance problems.   Hypertension  The current episode started more than 1 year ago. The problem is controlled. Pertinent negatives include no anxiety, chest pain, malaise/fatigue, orthopnea, palpitations, peripheral edema, PND or shortness of breath. Risk factors for coronary artery disease include male gender. Current antihypertension treatment includes beta blockers.     Patient presents today for management of paroxysmal atrial fibrillation. Patient underwent cardioversion on 8/19/2022 that was successful. He reports that he has been feeling good since cardioversion. He denies any chest pain. He denies any heart racing or palpitations. He denies any leg swelling, orthopnea or PND. He reports that dyspnea on exertion has improved. He reports an unbalanced feeling at times that is unchanged. He denies monitoring his blood pressure at home. Patient denies any Xarelto and denies any bleeding. He reports that he is scheduled to have a scope of his prostate sometime coming up. He follows with Dr Galindo as PCP    The following portions of the patient's history were reviewed and updated as appropriate: allergies, current medications, past family history, past medical history, past social history, past surgical history and problem list.    No Known Allergies    Current Outpatient Medications:   •  busPIRone  (BUSPAR) 7.5 MG tablet, Take 7.5 mg by mouth 2 (two) times a day., Disp: , Rfl:   •  citalopram (CeleXA) 20 MG tablet, Take 20 mg by mouth Daily., Disp: , Rfl:   •  cyclobenzaprine (FLEXERIL) 5 MG tablet, , Disp: , Rfl:   •  dorzolamide-timolol (COSOPT) 22.3-6.8 MG/ML ophthalmic solution, 1 drop Every Morning., Disp: , Rfl:   •  FIBER PO, Take  by mouth., Disp: , Rfl:   •  hydrOXYzine (ATARAX) 10 MG tablet, Take 10 mg by mouth As Needed., Disp: , Rfl:   •  latanoprost (XALATAN) 0.005 % ophthalmic solution, 1 drop Every Night., Disp: , Rfl:   •  lisinopril (PRINIVIL,ZESTRIL) 10 MG tablet, Take 5 mg by mouth Daily., Disp: , Rfl:   •  pantoprazole (PROTONIX) 40 MG EC tablet, Take 1 tablet by mouth Daily., Disp: 30 tablet, Rfl: 11  •  Xarelto 20 MG tablet, Take 1 tablet by mouth once daily, Disp: 90 tablet, Rfl: 3  •  metoprolol tartrate (LOPRESSOR) 25 MG tablet, Take 1 tablet by mouth Daily for 20 days., Disp: 20 tablet, Rfl: 0  Past Medical History:   Diagnosis Date   • Anxiety    • Atrial fibrillation (HCC)    • DDD (degenerative disc disease), lumbar    • Essential hypertension 03/11/2021   • GERD (gastroesophageal reflux disease)    • Glaucoma    • Hepatitis C    • Kidney cysts      Social History     Socioeconomic History   • Marital status:    Tobacco Use   • Smoking status: Former Smoker     Packs/day: 2.00     Years: 30.00     Pack years: 60.00     Types: Cigarettes   • Smokeless tobacco: Never Used   • Tobacco comment: QUIT 10 YEARS AGO    Vaping Use   • Vaping Use: Never used   Substance and Sexual Activity   • Alcohol use: Yes     Comment: occ   • Drug use: Yes     Types: Marijuana   • Sexual activity: Defer       Review of Systems   Constitutional: Negative for malaise/fatigue.   HENT: Negative for nosebleeds.    Cardiovascular: Positive for dyspnea on exertion (improving). Negative for chest pain, irregular heartbeat, leg swelling, near-syncope, orthopnea, palpitations, paroxysmal nocturnal dyspnea  "and syncope.   Respiratory: Negative for shortness of breath.    Hematologic/Lymphatic: Bruises/bleeds easily.   Genitourinary: Negative for hematuria.   Neurological: Negative for dizziness and weakness.   All other systems reviewed and are negative.         Objective:     Vitals reviewed.   Constitutional:       General: Not in acute distress.     Appearance: Normal appearance. Well-developed.   Eyes:      Pupils: Pupils are equal, round, and reactive to light.   HENT:      Head: Normocephalic and atraumatic.      Nose: Nose normal.   Neck:      Vascular: No carotid bruit.   Pulmonary:      Effort: Pulmonary effort is normal. No respiratory distress.      Breath sounds: Normal breath sounds. No wheezing. No rales.   Cardiovascular:      Normal rate. Regular rhythm.      Murmurs: There is a grade 2/6 systolic murmur.   Edema:     Peripheral edema absent.   Abdominal:      General: There is no distension.      Palpations: Abdomen is soft.   Musculoskeletal: Normal range of motion.      Cervical back: Normal range of motion and neck supple. Skin:     General: Skin is warm.      Findings: No erythema or rash.   Neurological:      General: No focal deficit present.      Mental Status: Alert and oriented to person, place, and time.   Psychiatric:         Attention and Perception: Attention normal.         Mood and Affect: Mood normal.         Speech: Speech normal.         Behavior: Behavior normal.         Thought Content: Thought content normal.         Judgment: Judgment normal.         /80 (BP Location: Left arm, Patient Position: Sitting, Cuff Size: Adult)   Pulse 59   Ht 188 cm (74\")   Wt 90.7 kg (200 lb)   SpO2 97%   BMI 25.68 kg/m²       ECG 12 Lead    Date/Time: 9/1/2022 10:30 AM  Performed by: Jose Sarabia APRN  Authorized by: Jose Sarabia APRN   Comparison: compared with previous ECG from 8/15/2022  Similar to previous ECG  Rhythm: sinus bradycardia  Rate: bradycardic  BPM: " 59              Lab Review:     Holter monitor 7/18/2022:  Interpretation Summary     · Average HR: 109. Min HR: 50. Max HR: 233.     · Entire report was reviewed.  Monitoring in days as noted below  · In atrial fibrillation during entire monitoring duration  · Rare premature ventricular contractions with a PVC burden of: < 1%     · No correlated arrhythmia  · No significant pauses                  Conclusion:      In atrial fibrillation during entire monitoring duration  Intermittent increase in ventricular rate  Rates between  bpm average 109 bpm  Slowest rate of 50 bpm while in atrial fibrillation at 5:09 AM presumably during sleep  Single 5 beat run of nonsustained ventricular tachycardia at a maximum rate of 156 bpm and average rate of 136 bpm     Cardioversion 8/15/2022:   Interpretation Summary  ·  Post cardioversion the patient displayed a sinus rhythm.  · The cardioversion was successful.     I have personally reviewed holter monitor, past office notes and cardioversion report prior to patients visit  Assessment:          Diagnosis Plan   1. Paroxysmal atrial fibrillation (HCC)  ECG 12 Lead   2. Current use of long term anticoagulation     3. PSVT (paroxysmal supraventricular tachycardia) (HCC)     4. Sinus bradycardia     5. Essential hypertension            Plan:       1. PAF: paroxysmal atrial fibrillation:s/p Cardioversion 8/19/2022.  EKG today shows sinus bradycardia. Patient is to continue metoprolol. Discussed EP referral and patient would like to wait and see if atrial fibrillation recurs.     2. Chronic anticoagulation: Patient is on Xarelto and denies any bleeding issues. No stopping Xarelto until 4 weeks after cardioversion which would be 9/12/2022    3. PSVT: asymptomatic    4. Sinus bradycardia: EKG shows sinus bradycardia with rate 59.     5. Hypertension: well controlled. Continue current medications    Patient is to follow up in 3 months or sooner if needed    EKG today sinus  bradycardia. EP referral if atiral fibirlltion recurs

## 2022-09-03 LAB
BH CV ECHO LEFT VENTRICLE GLOBAL LONGITUDINAL STRAIN: -20 %
BH CV ECHO MEAS - ACS: 2.1 CM
BH CV ECHO MEAS - AO MAX PG: 9.9 MMHG
BH CV ECHO MEAS - AO MEAN PG: 4 MMHG
BH CV ECHO MEAS - AO ROOT DIAM: 3.8 CM
BH CV ECHO MEAS - AO V2 MAX: 157 CM/SEC
BH CV ECHO MEAS - AO V2 VTI: 34.2 CM
BH CV ECHO MEAS - AVA(I,D): 4.2 CM2
BH CV ECHO MEAS - EDV(CUBED): 139.8 ML
BH CV ECHO MEAS - EDV(MOD-SP2): 116 ML
BH CV ECHO MEAS - EDV(MOD-SP4): 116 ML
BH CV ECHO MEAS - EF(MOD-BP): 61.7 %
BH CV ECHO MEAS - EF(MOD-SP2): 66.3 %
BH CV ECHO MEAS - EF(MOD-SP4): 55.8 %
BH CV ECHO MEAS - EF_3D-VOL: 61 %
BH CV ECHO MEAS - ESV(CUBED): 39.7 ML
BH CV ECHO MEAS - ESV(MOD-SP2): 39.1 ML
BH CV ECHO MEAS - ESV(MOD-SP4): 51.3 ML
BH CV ECHO MEAS - FS: 34.3 %
BH CV ECHO MEAS - IVS/LVPW: 1.27 CM
BH CV ECHO MEAS - IVSD: 0.94 CM
BH CV ECHO MEAS - LA DIMENSION: 3.9 CM
BH CV ECHO MEAS - LAT PEAK E' VEL: 17.3 CM/SEC
BH CV ECHO MEAS - LV DIASTOLIC VOL/BSA (35-75): 54.9 CM2
BH CV ECHO MEAS - LV MASS(C)D: 154.4 GRAMS
BH CV ECHO MEAS - LV MAX PG: 5.9 MMHG
BH CV ECHO MEAS - LV MEAN PG: 2 MMHG
BH CV ECHO MEAS - LV SYSTOLIC VOL/BSA (12-30): 24.3 CM2
BH CV ECHO MEAS - LV V1 MAX: 121 CM/SEC
BH CV ECHO MEAS - LV V1 VTI: 29.4 CM
BH CV ECHO MEAS - LVIDD: 5.2 CM
BH CV ECHO MEAS - LVIDS: 3.4 CM
BH CV ECHO MEAS - LVOT AREA: 4.9 CM2
BH CV ECHO MEAS - LVOT DIAM: 2.5 CM
BH CV ECHO MEAS - LVPWD: 0.74 CM
BH CV ECHO MEAS - MED PEAK E' VEL: 9.6 CM/SEC
BH CV ECHO MEAS - MR MAX PG: 76 MMHG
BH CV ECHO MEAS - MR MAX VEL: 436 CM/SEC
BH CV ECHO MEAS - MV A MAX VEL: 73.3 CM/SEC
BH CV ECHO MEAS - MV DEC SLOPE: 430 CM/SEC2
BH CV ECHO MEAS - MV E MAX VEL: 112 CM/SEC
BH CV ECHO MEAS - MV E/A: 1.53
BH CV ECHO MEAS - MV P1/2T: 76.3 MSEC
BH CV ECHO MEAS - MVA(P1/2T): 2.9 CM2
BH CV ECHO MEAS - PA V2 MAX: 99.9 CM/SEC
BH CV ECHO MEAS - PULM A REVS DUR: 0.13 SEC
BH CV ECHO MEAS - PULM A REVS VEL: 22.7 CM/SEC
BH CV ECHO MEAS - PULM DIAS VEL: 67.7 CM/SEC
BH CV ECHO MEAS - PULM S/D: 1.37
BH CV ECHO MEAS - PULM SYS VEL: 93 CM/SEC
BH CV ECHO MEAS - RAP SYSTOLE: 5 MMHG
BH CV ECHO MEAS - RV MAX PG: 2.7 MMHG
BH CV ECHO MEAS - RV V1 MAX: 81.6 CM/SEC
BH CV ECHO MEAS - RV V1 VTI: 23.8 CM
BH CV ECHO MEAS - RVDD: 4 CM
BH CV ECHO MEAS - RVSP: 28.6 MMHG
BH CV ECHO MEAS - SI(MOD-SP2): 36.4 ML/M2
BH CV ECHO MEAS - SI(MOD-SP4): 30.6 ML/M2
BH CV ECHO MEAS - SV(LVOT): 144.3 ML
BH CV ECHO MEAS - SV(MOD-SP2): 76.9 ML
BH CV ECHO MEAS - SV(MOD-SP4): 64.7 ML
BH CV ECHO MEAS - TAPSE (>1.6): 2.9 CM
BH CV ECHO MEAS - TR MAX PG: 23.6 MMHG
BH CV ECHO MEAS - TR MAX VEL: 243 CM/SEC
BH CV ECHO MEASUREMENTS AVERAGE E/E' RATIO: 8.33
BH CV XLRA - RV BASE: 4.2 CM
CYTO UR: NORMAL
LAB AP CASE REPORT: NORMAL
LEFT ATRIUM VOLUME INDEX: 50.2 ML/M2
LEFT ATRIUM VOLUME: 106 ML
Lab: NORMAL
MAXIMAL PREDICTED HEART RATE: 158 BPM
PATH REPORT.FINAL DX SPEC: NORMAL
PATH REPORT.GROSS SPEC: NORMAL
STRESS TARGET HR: 134 BPM

## 2022-09-06 ENCOUNTER — TELEPHONE (OUTPATIENT)
Dept: UROLOGY | Facility: CLINIC | Age: 62
End: 2022-09-06

## 2022-09-06 NOTE — TELEPHONE ENCOUNTER
Patient is having more burning and hurting since he was here last.   He would like for you to send him something into the pharmacy for that.  He said that you discussed it as his visit, but it wasn't bad (buning and hurting).       Please advise.

## 2022-09-08 NOTE — TELEPHONE ENCOUNTER
Patient called back stating no one has called back. I informed patient of kristina's note and also made him a appointment. Patient states he is in pain and having burning. Patient made a appointment to be see.

## 2022-09-09 ENCOUNTER — OFFICE VISIT (OUTPATIENT)
Dept: UROLOGY | Facility: CLINIC | Age: 62
End: 2022-09-09

## 2022-09-09 VITALS — BODY MASS INDEX: 25.64 KG/M2 | TEMPERATURE: 97.8 F | WEIGHT: 199.8 LBS | HEIGHT: 74 IN

## 2022-09-09 DIAGNOSIS — R39.12 WEAK URINARY STREAM: ICD-10-CM

## 2022-09-09 DIAGNOSIS — R30.0 DYSURIA: Primary | ICD-10-CM

## 2022-09-09 PROCEDURE — 99213 OFFICE O/P EST LOW 20 MIN: CPT

## 2022-09-09 RX ORDER — TAMSULOSIN HYDROCHLORIDE 0.4 MG/1
1 CAPSULE ORAL NIGHTLY
Qty: 30 CAPSULE | Refills: 11 | Status: SHIPPED | OUTPATIENT
Start: 2022-09-09 | End: 2023-09-04

## 2022-09-09 RX ORDER — PHENAZOPYRIDINE HYDROCHLORIDE 100 MG/1
100 TABLET, FILM COATED ORAL 3 TIMES DAILY PRN
Qty: 20 TABLET | Refills: 0 | Status: SHIPPED | OUTPATIENT
Start: 2022-09-09 | End: 2023-01-26

## 2022-09-12 ENCOUNTER — HOSPITAL ENCOUNTER (OUTPATIENT)
Dept: CT IMAGING | Facility: HOSPITAL | Age: 62
Discharge: HOME | End: 2022-09-12

## 2022-09-12 DIAGNOSIS — R31.0 GROSS HEMATURIA: ICD-10-CM

## 2022-09-12 LAB — CREAT BLDA-MCNC: 0.8 MG/DL (ref 0.6–1.3)

## 2022-09-12 PROCEDURE — 82565 ASSAY OF CREATININE: CPT

## 2022-09-12 PROCEDURE — 74178 CT ABD&PLV WO CNTR FLWD CNTR: CPT

## 2022-09-12 PROCEDURE — 0 IOPAMIDOL PER 1 ML

## 2022-09-12 RX ADMIN — IOPAMIDOL 100 ML: 755 INJECTION, SOLUTION INTRAVENOUS at 10:40

## 2022-09-12 NOTE — PROGRESS NOTES
Subjective    Mr. Mendez is 62 y.o. male    Chief Complaint: Dysuria, urine frequency and urgency    History of Present Illness     62-year-old male established patient recently seen on 8/30/2022 as patient was referred for acute cystitis and gross hematuria.  Patient states at this time he continues to have end of urine stream burning and urinary frequency and urgency.  Patient's urinalysis today is again clear.  No signs of infection based on urinalysis.  Patient reports he has been able to notice with certain liquid beverages that he consumes pain seems to worsen.  Patient denies any fever, chills, low back pain.  Patient is awaiting CT urogram and cystoscopy at this time for recent gross hematuria episode.  Patient is on no current alpha-blocker therapy.    The following portions of the patient's history were reviewed and updated as appropriate: allergies, current medications, past family history, past medical history, past social history, past surgical history and problem list.    Review of Systems   Constitutional: Negative for chills, fatigue and fever.   Gastrointestinal: Negative for nausea and vomiting.   Genitourinary: Positive for dysuria. Negative for flank pain and hematuria.         Current Outpatient Medications:   •  busPIRone (BUSPAR) 7.5 MG tablet, Take 7.5 mg by mouth 2 (two) times a day., Disp: , Rfl:   •  citalopram (CeleXA) 20 MG tablet, Take 20 mg by mouth Daily., Disp: , Rfl:   •  cyclobenzaprine (FLEXERIL) 5 MG tablet, , Disp: , Rfl:   •  dorzolamide-timolol (COSOPT) 22.3-6.8 MG/ML ophthalmic solution, 1 drop Every Morning., Disp: , Rfl:   •  FIBER PO, Take  by mouth., Disp: , Rfl:   •  hydrOXYzine (ATARAX) 10 MG tablet, Take 10 mg by mouth As Needed., Disp: , Rfl:   •  latanoprost (XALATAN) 0.005 % ophthalmic solution, 1 drop Every Night., Disp: , Rfl:   •  lisinopril (PRINIVIL,ZESTRIL) 10 MG tablet, Take 5 mg by mouth Daily., Disp: , Rfl:   •  pantoprazole (PROTONIX) 40 MG EC tablet, Take  1 tablet by mouth Daily., Disp: 30 tablet, Rfl: 11  •  Xarelto 20 MG tablet, Take 1 tablet by mouth once daily, Disp: 90 tablet, Rfl: 3  •  metoprolol tartrate (LOPRESSOR) 25 MG tablet, Take 1 tablet by mouth Daily for 20 days., Disp: 20 tablet, Rfl: 0  •  phenazopyridine (PYRIDIUM) 100 MG tablet, Take 1 tablet by mouth 3 (Three) Times a Day As Needed for Bladder Spasms., Disp: 20 tablet, Rfl: 0  •  tamsulosin (FLOMAX) 0.4 MG capsule 24 hr capsule, Take 1 capsule by mouth Every Night for 360 days., Disp: 30 capsule, Rfl: 11  No current facility-administered medications for this visit.    Past Medical History:   Diagnosis Date   • Anxiety    • Atrial fibrillation (HCC)    • DDD (degenerative disc disease), lumbar    • Essential hypertension 03/11/2021   • GERD (gastroesophageal reflux disease)    • Glaucoma    • Hepatitis C    • Kidney cysts        Past Surgical History:   Procedure Laterality Date   • COLONOSCOPY     • COLONOSCOPY N/A 3/21/2022    Procedure: COLONOSCOPY WITH ANESTHESIA;  Surgeon: Teja Parker MD;  Location: [facility] ENDOSCOPY;  Service: Gastroenterology;  Laterality: N/A;  pre RUQ pain; hx colon polyp  post; polyps   Kaz Galindo DO   • COLOSTOMY     • COLOSTOMY REVISION     • ENDOSCOPY     • ENDOSCOPY N/A 3/21/2022    Procedure: ESOPHAGOGASTRODUODENOSCOPY WITH ANESTHESIA;  Surgeon: Teja Parker MD;  Location: [facility] ENDOSCOPY;  Service: Gastroenterology;  Laterality: N/A;  pre GERD  post; normal   Kaz Galindo DO   • EYE SURGERY         Social History     Socioeconomic History   • Marital status:    Tobacco Use   • Smoking status: Former Smoker     Packs/day: 2.00     Years: 30.00     Pack years: 60.00     Types: Cigarettes   • Smokeless tobacco: Never Used   • Tobacco comment: QUIT 10 YEARS AGO    Vaping Use   • Vaping Use: Never used   Substance and Sexual Activity   • Alcohol use: Yes     Comment: occ   • Drug use: Yes     Types: Marijuana   • Sexual  "activity: Defer       Family History   Problem Relation Age of Onset   • Diabetes Mother    • No Known Problems Father    • Colon cancer Neg Hx    • Colon polyps Neg Hx    • Esophageal cancer Neg Hx        Objective    Temp 97.8 °F (36.6 °C)   Ht 188 cm (74\")   Wt 90.6 kg (199 lb 12.8 oz)   BMI 25.65 kg/m²     Physical Exam  Constitutional:       Appearance: Normal appearance.   Abdominal:      Tenderness: There is no abdominal tenderness. There is no right CVA tenderness or left CVA tenderness.   Skin:     General: Skin is warm and dry.   Neurological:      Mental Status: He is alert and oriented to person, place, and time.   Psychiatric:         Mood and Affect: Mood normal.         Behavior: Behavior normal.             Results for orders placed or performed in visit on 08/30/22   POC Urinalysis Dipstick, Multipro    Specimen: Urine   Result Value Ref Range    Color Yellow Yellow, Straw, Dark Yellow, Pretty    Clarity, UA Clear Clear    Glucose, UA Negative Negative mg/dL    Bilirubin Negative Negative    Ketones, UA Negative Negative    Specific Gravity  1.020 1.005 - 1.030    Blood, UA Negative Negative    pH, Urine 7.0 5.0 - 8.0    Protein, POC Negative Negative mg/dL    Urobilinogen, UA 0.2 E.U./dL Normal, 0.2 E.U./dL    Nitrite, UA Negative Negative    Leukocytes Negative Negative   Non-gynecologic Cytology    Specimen: Urine, Clean Catch   Result Value Ref Range    Note to Patients      Case Report       Non-gynecologic Cytology                          Case: [id number]                                  Authorizing Provider:  Tia Zuniga APRN  Collected:           08/30/2022 11:18 AM          Ordering Location:     [facility]     Received:            08/31/2022 09:02 AM                                 [facility] [place]                                                        Pathologist:           Joyce Martin MD                                                        Specimen:  "   Urine, Clean Catch, urine, clean catch                                                     Final Diagnosis       Urine, ThinPrep preparation (1):  A.  Squamous epithelial cells and urothelial cells.  B.  No malignant cells identified.      Gross Description       1. Urine, Clean Catch.  Received in cytolyt labeled with patient name and  designated as urine, clean catch.  60 mls with clear yellow fluid.  1 thin prep slide prepared.      Microscopic Description       ThinPrep preparation of urine reveals squamous epithelial cells and urothelial cells.  Malignant cells are not identified.       Assessment and Plan    Diagnoses and all orders for this visit:    1. Dysuria (Primary)  -     phenazopyridine (PYRIDIUM) 100 MG tablet; Take 1 tablet by mouth 3 (Three) Times a Day As Needed for Bladder Spasms.  Dispense: 20 tablet; Refill: 0  -     tamsulosin (FLOMAX) 0.4 MG capsule 24 hr capsule; Take 1 capsule by mouth Every Night for 360 days.  Dispense: 30 capsule; Refill: 11    2. Weak urinary stream  -     tamsulosin (FLOMAX) 0.4 MG capsule 24 hr capsule; Take 1 capsule by mouth Every Night for 360 days.  Dispense: 30 capsule; Refill: 11    62-year-old male established patient recently seen on 2022 as patient was referred for acute cystitis and gross hematuria.  Patient states at this time he continues to have end of urine stream burning and urinary frequency and urgency.  Patient's urinalysis today is again clear.  No signs of infection based on urinalysis.     is awaiting CT urogram and cystoscopy at this time for recent gross hematuria episode.  Patient is on no current alpha-blocker therapy.    Patient to keep scheduled appointment for CT urogram and cystoscopy for further evaluation.    Will start patient on tamsulosin alpha-blocker therapy 0.4 mg daily to see if any improvement in patient's LUTS symptoms as well as the burning with urination.  We will send patient in Pyridium as needed for pain.    Will  have patient follow-up in 3 months for reevaluation

## 2022-09-14 ENCOUNTER — PROCEDURE VISIT (OUTPATIENT)
Dept: UROLOGY | Facility: CLINIC | Age: 62
End: 2022-09-14

## 2022-09-14 DIAGNOSIS — R31.0 GROSS HEMATURIA: Primary | ICD-10-CM

## 2022-09-14 DIAGNOSIS — N32.9 LESION OF URINARY BLADDER: ICD-10-CM

## 2022-09-14 PROCEDURE — 81001 URINALYSIS AUTO W/SCOPE: CPT | Performed by: UROLOGY

## 2022-09-14 PROCEDURE — 52000 CYSTOURETHROSCOPY: CPT | Performed by: UROLOGY

## 2022-09-14 NOTE — PROGRESS NOTES
Pre- operative diagnosis:  Lower urinary tract symptoms, history of gross hematuria.  CT urogram done 9/12/2022 showed normal upper tracts, no stone mass or hydronephrosis.    Post operative diagnosis:  Several flat erythematous bladder mucosal lesions concerning for possible interstitial cystitis ulcerations/glomerulations versus CIS    Procedure:  The patient was prepped and draped in a normal sterile fashion.  The urethra was anesthetized with 2% lidocaine jelly.  A flexible cystoscope was introduced per urethra.      Urethra:  Normal    Bladder:  mild trabeculation and changes as described above    Ureteral orifices:  Normal position bilaterally and Clear efflux bilaterally    Prostate:  lateral lobe hypertrophy    Patient tolerated the procedure well    Complications: none    Blood loss: minimal    Follow up:    Schedule for OR for cystoscopy/bladder biopsy as well as bladder hydrodistention as both a possible therapeutic and diagnostic procedure for possible interstitial cystitis/bladder pain syndrome.  I recommended he continue his alpha-blocker.  He would like to schedule this for 9/27/2022.  We discussed risks of bladder biopsy including but not limited to infection, bleeding, need for additional procedures, failure to improve his symptoms, possibility of finding/not finding a problem, possible need for postoperative urethral catheter, complications of anesthesia.  He voiced understanding and provided informed consent to proceed.  He was instructed to hold his Xarelto 5 days preoperatively      This document has been signed by SOUTH Melendez MD on September 14, 2022 16:00 CDT

## 2022-09-20 ENCOUNTER — PRE-ADMISSION TESTING (OUTPATIENT)
Dept: PREADMISSION TESTING | Facility: HOSPITAL | Age: 62
End: 2022-09-20

## 2022-09-20 VITALS
HEART RATE: 66 BPM | SYSTOLIC BLOOD PRESSURE: 114 MMHG | OXYGEN SATURATION: 99 % | HEIGHT: 74 IN | BODY MASS INDEX: 25.8 KG/M2 | RESPIRATION RATE: 20 BRPM | DIASTOLIC BLOOD PRESSURE: 73 MMHG | WEIGHT: 201.06 LBS

## 2022-09-20 PROCEDURE — 93010 ELECTROCARDIOGRAM REPORT: CPT | Performed by: INTERNAL MEDICINE

## 2022-09-20 PROCEDURE — 93005 ELECTROCARDIOGRAM TRACING: CPT | Performed by: UROLOGY

## 2022-09-20 PROCEDURE — 81003 URINALYSIS AUTO W/O SCOPE: CPT | Performed by: UROLOGY

## 2022-09-20 PROCEDURE — 80048 BASIC METABOLIC PNL TOTAL CA: CPT | Performed by: UROLOGY

## 2022-09-20 PROCEDURE — 85027 COMPLETE CBC AUTOMATED: CPT | Performed by: UROLOGY

## 2022-09-20 NOTE — DISCHARGE INSTRUCTIONS
Before you come to the hospital        Arrival time: AS DIRECTED BY OFFICE     YOU MAY TAKE THE FOLLOWING MEDICATION(S) THE MORNING OF SURGERY WITH A SIP OF WATER:  Buspirone (BUSPAR)  Metoprolol (LOPRESSOR)    Do not take your Lisinopril within 24 hours of surgery as directed by anesthesia           ALL OTHER HOME MEDICATION CHECK WITH YOUR PHYSICIAN (especially if you are taking diabetes medicines or blood thinners)    Do not take any Erectile Dysfunction medications (EX: CIALIS, VIAGRA) 24 hours prior to surgery.      If you were given and instructed to use a germ- killing soap, use as directed the night before surgery and again the morning of surgery or as directed by your surgeon.    (See attached information for How to Use Chlorhexidine for Bathing if applicable.)            Eating and drinking restrictions prior to scheduled arrival time    2 Hours before arrival time STOP   Drinking Clear liquids (water, apple juice-no pulp)     6 Hours before arrival time STOP   Milk or drinks that contain milk, full liquids    6 Hours before arrival time STOP   Light meals or foods, such as toast or cereal    8 Hours before arrival time STOP   Heavy foods, such as meat, fried foods, or fatty foods    (It is extremely important that you follow these guidelines to prevent delay or cancelation of your procedure)     Clear Liquids  Water and flavored water                                                                      Clear Fruit juices, such as cranberry juice and apple juice.  Black coffee (NO cream of any kind, including powdered).  Plain tea  Clear bouillon or broth.  Flavored gelatin.  Soda.  Gatorade or Powerade.  Full liquid examples  Juices that have pulp.  Frozen ice pops that contain fruit pieces.  Coffee with creamer  Milk.  Yogurt.                MANAGING PAIN AFTER SURGERY    We know you are probably wondering what your pain will be like after surgery.  Following surgery it is unrealistic to expect you will  not have pain.   Pain is how our bodies let us know that something is wrong or cautions us to be careful.  That said, our goal is to make your pain tolerable.    Methods we may use to treat your pain include (oral or IV medications, PCAs, epidurals, nerve blocks, etc.)   While some procedures require IV pain medications for a short time after surgery, transitioning to pain medications by mouth allows for better management of pain.   Your nurse will encourage you to take oral pain medications whenever possible.  IV medications work almost immediately, but only last a short while.  Taking medications by mouth allows for a more constant level of medication in your blood stream for a longer period of time.      Once your pain is out of control it is harder to get back under control.  It is important you are aware when your next dose of pain medication is due.  If you are admitted, your nurse may write the time of your next dose on the white board in your room to help you remember.      We are interested in your pain and encourage you to inform us about aggravating factors during your visit.   Many times a simple repositioning every few hours can make a big difference.    If your physician says it is okay, do not let your pain prevent you from getting out of bed. Be sure to call your nurse for assistance prior to getting up so you do not fall.      Before surgery, please decide your tolerable pain goal.  These faces help describe the pain ratings we use on a 0-10 scale.   Be prepared to tell us your goal and whether or not you take pain or anxiety medications at home.          Preparing for Surgery  Preparing for surgery is an important part of your care. It can make things go more smoothly and help you avoid complications. The steps leading up to surgery may vary among hospitals. Follow all instructions given to you by your health care providers. Ask questions if you do not understand something. Talk about any concerns  that you have.  Here are some questions to consider asking before your surgery:  If my surgery is not an emergency (is elective), when would be the best time to have the surgery?  What arrangements do I need to make for work, home, or school?  What will my recovery be like? How long will it be before I can return to normal activities?  Will I need to prepare my home? Will I need to arrange care for me or my children?  Should I expect to have pain after surgery? What are my pain management options? Are there nonmedical options that I can try for pain?  Tell a health care provider about:  Any allergies you have.  All medicines you are taking, including vitamins, herbs, eye drops, creams, and over-the-counter medicines.  Any problems you or family members have had with anesthetic medicines.  Any blood disorders you have.  Any surgeries you have had.  Any medical conditions you have.  Whether you are pregnant or may be pregnant.  What are the risks?  The risks and complications of surgery depend on the specific procedure that you have. Discuss all the risks with your health care providers before your surgery. Ask about common surgical complications, which may include:  Infection.  Bleeding or a need for blood replacement (transfusion).  Allergic reactions to medicines.  Damage to surrounding nerves, tissues, or structures.  A blood clot.  Scarring.  Failure of the surgery to correct the problem.  Follow these instructions before the procedure:  Several days or weeks before your procedure  You may have a physical exam by your primary health care provider to make sure it is safe for you to have surgery.  You may have testing. This may include a chest X-ray, blood and urine tests, electrocardiogram (ECG), or other testing.  Ask your health care provider about:  Changing or stopping your regular medicines. This is especially important if you are taking diabetes medicines or blood thinners.  Taking medicines such as aspirin  and ibuprofen. These medicines can thin your blood. Do not take these medicines unless your health care provider tells you to take them.  Taking over-the-counter medicines, vitamins, herbs, and supplements.  Do not use any products that contain nicotine or tobacco, such as cigarettes and e-cigarettes. If you need help quitting, ask your health care provider.  Avoid alcohol.  Ask your health care provider if there are exercises you can do to prepare for surgery.  Eat a healthy diet.   Plan to have someone take you home from the hospital or clinic.  Plan to have a responsible adult care for you for at least 24 hours after you leave the hospital or clinic. This is important.  The day before your procedure  You may be given antibiotic medicine to take by mouth to help prevent infection. Take it as told by your health care provider.  You may be asked to shower with a germ-killing soap.  Follow instructions from your health care provider about eating and drinking restrictions. This includes gum, mints and hard candy.  Pack comfortable clothes according to your procedure.   The day of your procedure  You may need to take another shower with a germ-killing soap before you leave home in the morning.  With a small sip of water, take only the medicines that you are told to take.  Remove all jewelry including rings.   Leave anything you consider valuable at home except hearing aids if needed.  Do not wear any makeup, nail polish, powder, deodorant, lotion, hair accessories, or anything on your skin or body except your clothes.  If you will be staying in the hospital, bring a case to hold your glasses, contacts, or dentures. You may also want to bring your robe and non-skid footwear.  If you wear oxygen at home, bring it with you the day of surgery.  If instructed by your health care provider, bring your sleep apnea device with you on the day of your surgery (if this applies to you).  You may want to leave your suitcase and  sleep apnea device in the car until after surgery.   Arrive at the hospital as scheduled.  Bring a friend or family member with you who can help to answer questions and be present while you meet with your health care provider.  At the hospital  When you arrive at the hospital:  Go to registration located at the main entrance of the hospital. You will be registered and given a beeper and a sticker sheet. Take the stickers to the Outpatient nurses desk and place in the black tray. This is to notify staff that you have arrived. Then return to the lobby to wait.   When your beeper lights up and vibrates proceed through the double doors, under the stairs, and a member of the Outpatient Surgery staff will escort you to your preoperative room.  You may have to wear compression sleeves. These help to prevent blood clots and reduce swelling in your legs.  An IV may be inserted into one of your veins.              In the operating room, you may be given one or more of the following:        A medicine to help you relax (sedative).        A medicine to numb the area (local anesthetic).        A medicine to make you fall asleep (general anesthetic).        A medicine that is injected into an area of your body to numb everything below the                      injection site (regional anesthetic).  You may be given an antibiotic through your IV to help prevent infection.  Your surgical site will be marked or identified.    Contact a health care provider if you:  Develop a fever of more than 100.4°F (38°C) or other feelings of illness during the 48 hours before your surgery.  Have symptoms that get worse.  Have questions or concerns about your surgery.  Summary  Preparing for surgery can make the procedure go more smoothly and lower your risk of complications.  Before surgery, make a list of questions and concerns to discuss with your surgeon. Ask about the risks and possible complications.  In the days or weeks before your  surgery, follow all instructions from your health care provider. You may need to stop smoking, avoid alcohol, follow eating restrictions, and change or stop your regular medicines.  Contact your surgeon if you develop a fever or other signs of illness during the few days before your surgery.  This information is not intended to replace advice given to you by your health care provider. Make sure you discuss any questions you have with your health care provider.  Document Revised: 12/21/2018 Document Reviewed: 10/23/2018  Elsevier Patient Education © 2021 [employer]

## 2022-09-22 ENCOUNTER — TELEPHONE (OUTPATIENT)
Dept: UROLOGY | Facility: CLINIC | Age: 62
End: 2022-09-22

## 2022-09-22 NOTE — TELEPHONE ENCOUNTER
Called patient to remind them to arrive at patient registration on 9-27-22 at 730am for the procedure with Dr. Melendez. Left message with patient. Told patient if they had any questions to please contact our office at [phone]. HUB can give pt this information if the pt calls back

## 2022-09-23 LAB
QT INTERVAL: 444 MS
QTC INTERVAL: 432 MS

## 2022-09-27 ENCOUNTER — ANESTHESIA EVENT (OUTPATIENT)
Dept: PERIOP | Facility: HOSPITAL | Age: 62
End: 2022-09-27

## 2022-09-27 ENCOUNTER — HOSPITAL ENCOUNTER (OUTPATIENT)
Facility: HOSPITAL | Age: 62
Setting detail: HOSPITAL OUTPATIENT SURGERY
Discharge: HOME | End: 2022-09-27
Attending: UROLOGY | Admitting: UROLOGY

## 2022-09-27 ENCOUNTER — ANESTHESIA (OUTPATIENT)
Dept: PERIOP | Facility: HOSPITAL | Age: 62
End: 2022-09-27

## 2022-09-27 VITALS
SYSTOLIC BLOOD PRESSURE: 124 MMHG | HEART RATE: 73 BPM | TEMPERATURE: 97.1 F | DIASTOLIC BLOOD PRESSURE: 80 MMHG | OXYGEN SATURATION: 97 % | RESPIRATION RATE: 18 BRPM

## 2022-09-27 DIAGNOSIS — N32.9 LESION OF URINARY BLADDER: ICD-10-CM

## 2022-09-27 PROCEDURE — 25010000002 DROPERIDOL PER 5 MG: Performed by: NURSE ANESTHETIST, CERTIFIED REGISTERED

## 2022-09-27 PROCEDURE — 25010000002 PROPOFOL 10 MG/ML EMULSION: Performed by: NURSE ANESTHETIST, CERTIFIED REGISTERED

## 2022-09-27 PROCEDURE — 25010000002 ONDANSETRON PER 1 MG: Performed by: NURSE ANESTHETIST, CERTIFIED REGISTERED

## 2022-09-27 PROCEDURE — 88305 TISSUE EXAM BY PATHOLOGIST: CPT | Performed by: UROLOGY

## 2022-09-27 PROCEDURE — 52260 CYSTOSCOPY AND TREATMENT: CPT | Performed by: UROLOGY

## 2022-09-27 PROCEDURE — 52234 CYSTOSCOPY AND TREATMENT: CPT | Performed by: UROLOGY

## 2022-09-27 PROCEDURE — 25010000002 LEVOFLOXACIN PER 250 MG: Performed by: UROLOGY

## 2022-09-27 PROCEDURE — 25010000002 FENTANYL CITRATE (PF) 100 MCG/2ML SOLUTION: Performed by: NURSE ANESTHETIST, CERTIFIED REGISTERED

## 2022-09-27 RX ORDER — NITROFURANTOIN 25; 75 MG/1; MG/1
100 CAPSULE ORAL 2 TIMES DAILY
Qty: 6 CAPSULE | Refills: 0 | Status: SHIPPED | OUTPATIENT
Start: 2022-09-27

## 2022-09-27 RX ORDER — FENTANYL CITRATE 50 UG/ML
INJECTION, SOLUTION INTRAMUSCULAR; INTRAVENOUS AS NEEDED
Status: DISCONTINUED | OUTPATIENT
Start: 2022-09-27 | End: 2022-09-27 | Stop reason: SURG

## 2022-09-27 RX ORDER — FLUMAZENIL 0.1 MG/ML
0.2 INJECTION INTRAVENOUS AS NEEDED
Status: DISCONTINUED | OUTPATIENT
Start: 2022-09-27 | End: 2022-09-27 | Stop reason: HOSPADM

## 2022-09-27 RX ORDER — SODIUM CHLORIDE 0.9 % (FLUSH) 0.9 %
3 SYRINGE (ML) INJECTION AS NEEDED
Status: DISCONTINUED | OUTPATIENT
Start: 2022-09-27 | End: 2022-09-27 | Stop reason: HOSPADM

## 2022-09-27 RX ORDER — OXYBUTYNIN CHLORIDE 5 MG/1
5 TABLET ORAL EVERY 8 HOURS PRN
Qty: 30 TABLET | Refills: 1 | Status: SHIPPED | OUTPATIENT
Start: 2022-09-27

## 2022-09-27 RX ORDER — DOCUSATE SODIUM 100 MG/1
100 CAPSULE, LIQUID FILLED ORAL 2 TIMES DAILY
Qty: 60 CAPSULE | Refills: 1 | Status: SHIPPED | OUTPATIENT
Start: 2022-09-27

## 2022-09-27 RX ORDER — HYDROCODONE BITARTRATE AND ACETAMINOPHEN 5; 325 MG/1; MG/1
1 TABLET ORAL ONCE AS NEEDED
Status: DISCONTINUED | OUTPATIENT
Start: 2022-09-27 | End: 2022-09-27 | Stop reason: HOSPADM

## 2022-09-27 RX ORDER — LIDOCAINE HYDROCHLORIDE 20 MG/ML
INJECTION, SOLUTION EPIDURAL; INFILTRATION; INTRACAUDAL; PERINEURAL AS NEEDED
Status: DISCONTINUED | OUTPATIENT
Start: 2022-09-27 | End: 2022-09-27 | Stop reason: SURG

## 2022-09-27 RX ORDER — LIDOCAINE HYDROCHLORIDE 10 MG/ML
0.5 INJECTION, SOLUTION EPIDURAL; INFILTRATION; INTRACAUDAL; PERINEURAL ONCE AS NEEDED
Status: DISCONTINUED | OUTPATIENT
Start: 2022-09-27 | End: 2022-09-27 | Stop reason: SDUPTHER

## 2022-09-27 RX ORDER — PHENAZOPYRIDINE HYDROCHLORIDE 100 MG/1
100 TABLET, FILM COATED ORAL 3 TIMES DAILY PRN
Qty: 20 TABLET | Refills: 1 | Status: SHIPPED | OUTPATIENT
Start: 2022-09-27

## 2022-09-27 RX ORDER — SODIUM CHLORIDE, SODIUM LACTATE, POTASSIUM CHLORIDE, CALCIUM CHLORIDE 600; 310; 30; 20 MG/100ML; MG/100ML; MG/100ML; MG/100ML
9 INJECTION, SOLUTION INTRAVENOUS CONTINUOUS
Status: DISCONTINUED | OUTPATIENT
Start: 2022-09-27 | End: 2022-09-27 | Stop reason: HOSPADM

## 2022-09-27 RX ORDER — ONDANSETRON 2 MG/ML
4 INJECTION INTRAMUSCULAR; INTRAVENOUS ONCE AS NEEDED
Status: DISCONTINUED | OUTPATIENT
Start: 2022-09-27 | End: 2022-09-27 | Stop reason: HOSPADM

## 2022-09-27 RX ORDER — PROPOFOL 10 MG/ML
VIAL (ML) INTRAVENOUS AS NEEDED
Status: DISCONTINUED | OUTPATIENT
Start: 2022-09-27 | End: 2022-09-27 | Stop reason: SURG

## 2022-09-27 RX ORDER — SODIUM CHLORIDE, SODIUM LACTATE, POTASSIUM CHLORIDE, CALCIUM CHLORIDE 600; 310; 30; 20 MG/100ML; MG/100ML; MG/100ML; MG/100ML
1000 INJECTION, SOLUTION INTRAVENOUS CONTINUOUS
Status: DISCONTINUED | OUTPATIENT
Start: 2022-09-27 | End: 2022-09-27 | Stop reason: HOSPADM

## 2022-09-27 RX ORDER — LEVOFLOXACIN 5 MG/ML
500 INJECTION, SOLUTION INTRAVENOUS ONCE
Status: COMPLETED | OUTPATIENT
Start: 2022-09-27 | End: 2022-09-27

## 2022-09-27 RX ORDER — SODIUM CHLORIDE 0.9 % (FLUSH) 0.9 %
10 SYRINGE (ML) INJECTION AS NEEDED
Status: DISCONTINUED | OUTPATIENT
Start: 2022-09-27 | End: 2022-09-27 | Stop reason: HOSPADM

## 2022-09-27 RX ORDER — HYDROCODONE BITARTRATE AND ACETAMINOPHEN 5; 325 MG/1; MG/1
1 TABLET ORAL EVERY 6 HOURS PRN
Qty: 12 TABLET | Refills: 0 | Status: SHIPPED | OUTPATIENT
Start: 2022-09-27 | End: 2022-11-03 | Stop reason: DRUGHIGH

## 2022-09-27 RX ORDER — OXYCODONE AND ACETAMINOPHEN 10; 325 MG/1; MG/1
1 TABLET ORAL ONCE AS NEEDED
Status: COMPLETED | OUTPATIENT
Start: 2022-09-27 | End: 2022-09-27

## 2022-09-27 RX ORDER — NALOXONE HCL 0.4 MG/ML
0.4 VIAL (ML) INJECTION AS NEEDED
Status: DISCONTINUED | OUTPATIENT
Start: 2022-09-27 | End: 2022-09-27 | Stop reason: HOSPADM

## 2022-09-27 RX ORDER — IBUPROFEN 600 MG/1
600 TABLET ORAL ONCE AS NEEDED
Status: DISCONTINUED | OUTPATIENT
Start: 2022-09-27 | End: 2022-09-27 | Stop reason: HOSPADM

## 2022-09-27 RX ORDER — OXYCODONE AND ACETAMINOPHEN 7.5; 325 MG/1; MG/1
2 TABLET ORAL EVERY 4 HOURS PRN
Status: DISCONTINUED | OUTPATIENT
Start: 2022-09-27 | End: 2022-09-27 | Stop reason: HOSPADM

## 2022-09-27 RX ORDER — DROPERIDOL 2.5 MG/ML
0.62 INJECTION, SOLUTION INTRAMUSCULAR; INTRAVENOUS ONCE AS NEEDED
Status: DISCONTINUED | OUTPATIENT
Start: 2022-09-27 | End: 2022-09-27 | Stop reason: HOSPADM

## 2022-09-27 RX ORDER — ONDANSETRON 4 MG/1
4 TABLET, ORALLY DISINTEGRATING ORAL EVERY 6 HOURS PRN
Qty: 6 TABLET | Refills: 1 | Status: SHIPPED | OUTPATIENT
Start: 2022-09-27 | End: 2023-01-26

## 2022-09-27 RX ORDER — LIDOCAINE HYDROCHLORIDE 10 MG/ML
0.5 INJECTION, SOLUTION EPIDURAL; INFILTRATION; INTRACAUDAL; PERINEURAL ONCE AS NEEDED
Status: DISCONTINUED | OUTPATIENT
Start: 2022-09-27 | End: 2022-09-27 | Stop reason: HOSPADM

## 2022-09-27 RX ORDER — DEXTROSE MONOHYDRATE 25 G/50ML
12.5 INJECTION, SOLUTION INTRAVENOUS AS NEEDED
Status: DISCONTINUED | OUTPATIENT
Start: 2022-09-27 | End: 2022-09-27 | Stop reason: HOSPADM

## 2022-09-27 RX ORDER — DROPERIDOL 2.5 MG/ML
INJECTION, SOLUTION INTRAMUSCULAR; INTRAVENOUS AS NEEDED
Status: DISCONTINUED | OUTPATIENT
Start: 2022-09-27 | End: 2022-09-27 | Stop reason: SURG

## 2022-09-27 RX ORDER — MAGNESIUM HYDROXIDE 1200 MG/15ML
LIQUID ORAL AS NEEDED
Status: DISCONTINUED | OUTPATIENT
Start: 2022-09-27 | End: 2022-09-27 | Stop reason: HOSPADM

## 2022-09-27 RX ORDER — ONDANSETRON 2 MG/ML
INJECTION INTRAMUSCULAR; INTRAVENOUS AS NEEDED
Status: DISCONTINUED | OUTPATIENT
Start: 2022-09-27 | End: 2022-09-27 | Stop reason: SURG

## 2022-09-27 RX ORDER — LABETALOL HYDROCHLORIDE 5 MG/ML
5 INJECTION, SOLUTION INTRAVENOUS
Status: DISCONTINUED | OUTPATIENT
Start: 2022-09-27 | End: 2022-09-27 | Stop reason: HOSPADM

## 2022-09-27 RX ORDER — ATROPA BELLADONNA AND OPIUM 16.2; 3 MG/1; MG/1
SUPPOSITORY RECTAL AS NEEDED
Status: DISCONTINUED | OUTPATIENT
Start: 2022-09-27 | End: 2022-09-27 | Stop reason: HOSPADM

## 2022-09-27 RX ORDER — FENTANYL CITRATE 50 UG/ML
25 INJECTION, SOLUTION INTRAMUSCULAR; INTRAVENOUS
Status: DISCONTINUED | OUTPATIENT
Start: 2022-09-27 | End: 2022-09-27 | Stop reason: HOSPADM

## 2022-09-27 RX ORDER — ATROPA BELLADONNA AND OPIUM 16.2; 6 MG/1; MG/1
SUPPOSITORY RECTAL
Status: DISCONTINUED
Start: 2022-09-27 | End: 2022-09-27 | Stop reason: HOSPADM

## 2022-09-27 RX ORDER — SODIUM CHLORIDE 0.9 % (FLUSH) 0.9 %
10 SYRINGE (ML) INJECTION EVERY 12 HOURS SCHEDULED
Status: DISCONTINUED | OUTPATIENT
Start: 2022-09-27 | End: 2022-09-27 | Stop reason: HOSPADM

## 2022-09-27 RX ADMIN — PROPOFOL 200 MG: 10 INJECTION, EMULSION INTRAVENOUS at 10:41

## 2022-09-27 RX ADMIN — ONDANSETRON 8 MG: 2 INJECTION INTRAMUSCULAR; INTRAVENOUS at 10:51

## 2022-09-27 RX ADMIN — SODIUM CHLORIDE, POTASSIUM CHLORIDE, SODIUM LACTATE AND CALCIUM CHLORIDE 1000 ML: 600; 310; 30; 20 INJECTION, SOLUTION INTRAVENOUS at 08:35

## 2022-09-27 RX ADMIN — FENTANYL CITRATE 100 MCG: 50 INJECTION, SOLUTION INTRAMUSCULAR; INTRAVENOUS at 10:41

## 2022-09-27 RX ADMIN — OXYCODONE AND ACETAMINOPHEN 1 TABLET: 325; 10 TABLET ORAL at 11:29

## 2022-09-27 RX ADMIN — DROPERIDOL 1.25 MG: 2.5 INJECTION, SOLUTION INTRAMUSCULAR; INTRAVENOUS at 10:41

## 2022-09-27 RX ADMIN — LIDOCAINE HYDROCHLORIDE 100 MG: 20 INJECTION, SOLUTION EPIDURAL; INFILTRATION; INTRACAUDAL; PERINEURAL at 10:41

## 2022-09-27 RX ADMIN — LEVOFLOXACIN 500 MG: 500 INJECTION, SOLUTION INTRAVENOUS at 10:19

## 2022-09-27 NOTE — ANESTHESIA PROCEDURE NOTES
Airway  Urgency: elective    Date/Time: 9/27/2022 10:41 AM  Airway not difficult    General Information and Staff    Patient location during procedure: OR  CRNA/CAA: Hussein Soto CRNA    Indications and Patient Condition  Indications for airway management: airway protection    Preoxygenated: yes  MILS maintained throughout  Mask difficulty assessment: 1 - vent by mask    Final Airway Details  Final airway type: supraglottic airway      Successful airway: Supreme  Size 4    Number of attempts at approach: 1  Assessment: lips, teeth, and gum same as pre-op and atraumatic intubation

## 2022-09-27 NOTE — ANESTHESIA POSTPROCEDURE EVALUATION
Patient: Viktor Mendez    Procedure Summary     Date: 09/27/22 Room / Location:  [facility]  [facility]    Anesthesia Start: 1038 Anesthesia Stop: 1107    Procedures:       CYSTOSCOPY BLADDER BIOPSY, BLADDER HYDRODISTENSION (N/A Bladder)      BLADDER HYDRODISTENSION (N/A Bladder) Diagnosis:       Lesion of urinary bladder      (Lesion of urinary bladder [N32.9])    Surgeons: Ike Melendez MD Provider: Hussein Soto CRNA    Anesthesia Type: general ASA Status: 3          Anesthesia Type: general    Vitals  Vitals Value Taken Time   /83 09/27/22 1108   Temp     Pulse 66 09/27/22 1108   Resp     SpO2 98 % 09/27/22 1108   Vitals shown include unvalidated device data.        Post Anesthesia Care and Evaluation    Patient location during evaluation: PACU  Patient participation: complete - patient participated  Level of consciousness: awake and alert  Pain management: adequate    Airway patency: patent  Anesthetic complications: No anesthetic complications    Cardiovascular status: acceptable  Respiratory status: acceptable  Hydration status: acceptable    Comments: Blood pressure 126/91, pulse 50, temperature 97 °F (36.1 °C), temperature source Temporal, resp. rate 16, SpO2 100 %.    Pt discharged from PACU based on petr score >8

## 2022-09-27 NOTE — ANESTHESIA PREPROCEDURE EVALUATION
Anesthesia Evaluation     Patient summary reviewed   no history of anesthetic complications:  NPO Solid Status: > 8 hours             Airway   Mallampati: II  TM distance: >3 FB  Neck ROM: full  Dental      Pulmonary    (+) a smoker Former, COPD,   (-) asthma, sleep apnea  Cardiovascular   Exercise tolerance: good (4-7 METS)    (+) hypertension, dysrhythmias Atrial Fib,   (-) pacemaker, past MI, angina, cardiac stents      Neuro/Psych  (-) seizures, TIA, CVA  GI/Hepatic/Renal/Endo    (+)   hepatitis C,   (-) GERD, liver disease, no renal disease, diabetes    Musculoskeletal     Abdominal    Substance History      OB/GYN          Other                        Anesthesia Plan    ASA 3     general     intravenous induction     Anesthetic plan, risks, benefits, and alternatives have been provided, discussed and informed consent has been obtained with: patient.        CODE STATUS:

## 2022-10-04 ENCOUNTER — TELEPHONE (OUTPATIENT)
Dept: UROLOGY | Facility: CLINIC | Age: 62
End: 2022-10-04

## 2022-10-04 NOTE — TELEPHONE ENCOUNTER
Called and spoke with pt and let him know that the path report wasn't back yet and dr rondon would call him as soon as we knew something

## 2022-10-04 NOTE — TELEPHONE ENCOUNTER
Caller: CHRISTIANO SIMPSON    Relationship: SON    Best call back number: [phone]    Caller requesting test results: BLADDER BIOPSY    What test was performed: CYSTOSCOPY BLADDER BIOPSY, BLADDER HYDRODISTENSION    When was the test performed: 9/27/22    Where was the test performed:

## 2022-10-05 ENCOUNTER — TELEPHONE (OUTPATIENT)
Dept: UROLOGY | Facility: CLINIC | Age: 62
End: 2022-10-05

## 2022-10-05 LAB
CYTO UR: NORMAL
LAB AP CASE REPORT: NORMAL
Lab: NORMAL
PATH REPORT.FINAL DX SPEC: NORMAL
PATH REPORT.GROSS SPEC: NORMAL

## 2022-10-05 NOTE — TELEPHONE ENCOUNTER
----- Message from Ike Melendez MD sent at 10/5/2022  3:03 PM CDT -----  Please call and let patient know that his biopsy showed no cancer,  and he should f/u with Tia as scheduled.    Thank you    ----- Message -----  From: Lab, Background User  Sent: 10/5/2022   2:41 PM CDT  To: Ike Melendez MD

## 2022-11-02 NOTE — PROGRESS NOTES
"Subjective    Mr. Mendez is 62 y.o. male    Chief Complaint: Follow-up lesion of Bladder, hematuria, urine frequency    History of Present Illness    62-year-old male established patient in for follow-up as patient is s/p cystoscopy bladder biopsy and hydrodistention for history of bothersome LUTS refractory to alpha-blocker therapy as well as reported gross hematuria for which patient underwent a cystoscopy by Dr. Melendez finding a small erythematous bladder lesion along the posterior bladder wall.  Tissue pathology negative for malignancy.     Patient reports that since the hydrodistention procedure he has been able to notice improvement in his symptoms stating \"there has only been 1 or 2 days since the procedure that I have had increased urinary frequency and slight burning other than that my symptoms are much improved\".  Patient remains on tamsulosin 0.4 mg daily.    The following portions of the patient's history were reviewed and updated as appropriate: allergies, current medications, past family history, past medical history, past social history, past surgical history and problem list.    Review of Systems   Constitutional: Negative for chills and fever.   Gastrointestinal: Negative for abdominal pain, anal bleeding, blood in stool, nausea and vomiting.   Genitourinary: Positive for frequency and urgency. Negative for decreased urine volume, difficulty urinating, dysuria, flank pain, hematuria and penile pain.         Current Outpatient Medications:   •  Acetaminophen (TYLENOL PO), Take 2 tablets by mouth As Needed., Disp: , Rfl:   •  busPIRone (BUSPAR) 15 MG tablet, TAKE 1 TABLET BY MOUTH IN THE MORNING AND AT BEDTIME, Disp: , Rfl:   •  citalopram (CeleXA) 20 MG tablet, Take 20 mg by mouth Daily., Disp: , Rfl:   •  cyclobenzaprine (FLEXERIL) 5 MG tablet, Take 5 mg by mouth 3 (Three) Times a Day As Needed for Muscle Spasms., Disp: , Rfl:   •  docusate sodium (Colace) 100 MG capsule, Take 1 capsule by mouth 2 " (Two) Times a Day., Disp: 60 capsule, Rfl: 1  •  dorzolamide-timolol (COSOPT) 22.3-6.8 MG/ML ophthalmic solution, Administer 1 drop to both eyes Every Morning., Disp: , Rfl:   •  hydrOXYzine (ATARAX) 10 MG tablet, Take 10 mg by mouth Every 6 (Six) Hours As Needed for Anxiety., Disp: , Rfl:   •  latanoprost (XALATAN) 0.005 % ophthalmic solution, Administer 1 drop to both eyes Every Night., Disp: , Rfl:   •  lisinopril (PRINIVIL,ZESTRIL) 10 MG tablet, Take 5 mg by mouth Daily., Disp: , Rfl:   •  nitrofurantoin, macrocrystal-monohydrate, (Macrobid) 100 MG capsule, Take 1 capsule by mouth 2 (Two) Times a Day., Disp: 6 capsule, Rfl: 0  •  ondansetron ODT (Zofran ODT) 4 MG disintegrating tablet, Place 1 tablet on the tongue Every 6 (Six) Hours As Needed for Nausea., Disp: 6 tablet, Rfl: 1  •  oxybutynin (DITROPAN) 5 MG tablet, Take 1 tablet by mouth Every 8 (Eight) Hours As Needed (bladder spasms)., Disp: 30 tablet, Rfl: 1  •  pantoprazole (PROTONIX) 40 MG EC tablet, Take 1 tablet by mouth Daily. (Patient taking differently: Take 1 tablet by mouth Daily As Needed (heartburn).), Disp: 30 tablet, Rfl: 11  •  phenazopyridine (PYRIDIUM) 100 MG tablet, Take 1 tablet by mouth 3 (Three) Times a Day As Needed for Bladder Spasms., Disp: 20 tablet, Rfl: 0  •  phenazopyridine (PYRIDIUM) 100 MG tablet, Take 1 tablet by mouth 3 (Three) Times a Day As Needed (urinary burning)., Disp: 20 tablet, Rfl: 1  •  tamsulosin (FLOMAX) 0.4 MG capsule 24 hr capsule, Take 1 capsule by mouth Every Night for 360 days., Disp: 30 capsule, Rfl: 11    Past Medical History:   Diagnosis Date   • Anxiety    • Asthma    • Atrial fibrillation (HCC)    • DDD (degenerative disc disease), lumbar    • Essential hypertension 03/11/2021   • GERD (gastroesophageal reflux disease)    • Glaucoma    • Hepatitis C    • Kidney cysts    • Low back pain    • Urinary incontinence 2022       Past Surgical History:   Procedure Laterality Date   • COLONOSCOPY     • COLONOSCOPY  "N/A 03/21/2022    Procedure: COLONOSCOPY WITH ANESTHESIA;  Surgeon: Teja Parker MD;  Location:  [facility];  Service: Gastroenterology;  Laterality: N/A;  pre RUQ pain; hx colon polyp  post; polyps   Kaz Galindo DO   • COLOSTOMY     • COLOSTOMY REVISION     • CYSTOSCOPY BLADDER BIOPSY N/A 09/27/2022    Procedure: CYSTOSCOPY BLADDER BIOPSY, BLADDER HYDRODISTENSION;  Surgeon: Ike Melendez MD;  Location:  [facility];  Service: Urology;  Laterality: N/A;   • CYSTOSCOPY BLADDER HYDRODISTENSION N/A 09/27/2022    Procedure: BLADDER HYDRODISTENSION;  Surgeon: Ike Melendez MD;  Location:  [facility];  Service: Urology;  Laterality: N/A;   • ENDOSCOPY     • ENDOSCOPY N/A 03/21/2022    Procedure: ESOPHAGOGASTRODUODENOSCOPY WITH ANESTHESIA;  Surgeon: Teja Parker MD;  Location:  [facility];  Service: Gastroenterology;  Laterality: N/A;  pre GERD  post; normal   Kaz Galindo DO   • EYE SURGERY         Social History     Socioeconomic History   • Marital status:    Tobacco Use   • Smoking status: Former     Packs/day: 2.00     Years: 30.00     Pack years: 60.00     Types: Cigarettes   • Smokeless tobacco: Never   • Tobacco comments:     QUIT 10 YEARS AGO    Vaping Use   • Vaping Use: Never used   Substance and Sexual Activity   • Alcohol use: Not Currently     Comment: occ   • Drug use: Yes     Types: Marijuana   • Sexual activity: Yes     Partners: Female       Family History   Problem Relation Age of Onset   • Diabetes Mother    • No Known Problems Father    • Colon cancer Neg Hx    • Colon polyps Neg Hx    • Esophageal cancer Neg Hx        Objective    Temp 98.1 °F (36.7 °C)   Ht 188 cm (74\")   Wt 90.9 kg (200 lb 6.4 oz)   BMI 25.73 kg/m²     Physical Exam        Results for orders placed or performed in visit on 11/08/22   POC Urinalysis Dipstick, Multipro    Specimen: Urine   Result Value Ref Range    Color Yellow Yellow, Straw, Dark Yellow, Pretty    Clarity, " "UA Clear Clear    Glucose, UA Negative Negative mg/dL    Bilirubin Negative Negative    Ketones, UA Negative Negative    Specific Gravity  1.020 1.005 - 1.030    Blood, UA Negative Negative    pH, Urine 6.0 5.0 - 8.0    Protein, POC Negative Negative mg/dL    Urobilinogen, UA 0.2 E.U./dL Normal, 0.2 E.U./dL    Nitrite, UA Negative Negative    Leukocytes Negative Negative     Assessment and Plan    Diagnoses and all orders for this visit:    1. Lesion of urinary bladder (Primary)  -     POC Urinalysis Dipstick, Multipro    2. Urine frequency      62-year-old male established patient in for follow-up as patient is s/p cystoscopy bladder biopsy and hydrodistention for history of bothersome LUTS refractory to alpha-blocker therapy as well as reported gross hematuria for which patient underwent a cystoscopy by Dr. Melendez finding a small erythematous bladder lesion along the posterior bladder wall.  Tissue pathology negative for malignancy.     Patient reports that since the hydrodistention procedure he has been able to notice improvement in his symptoms stating \"there has only been 1 or 2 days since the procedure that I have had increased urinary frequency and slight burning other than that my symptoms are much improved\".  Patient remains on tamsulosin 0.4 mg daily.    Given patient's symptoms are much improved will have patient continue tamsulosin 0.4 mg daily and follow-up in 6 months for reevaluation          "

## 2022-11-03 ENCOUNTER — OFFICE VISIT (OUTPATIENT)
Dept: NEUROSURGERY | Facility: CLINIC | Age: 62
End: 2022-11-03

## 2022-11-03 VITALS — BODY MASS INDEX: 25.93 KG/M2 | WEIGHT: 202 LBS | HEIGHT: 74 IN

## 2022-11-03 DIAGNOSIS — Z87.891 FORMER SMOKER: ICD-10-CM

## 2022-11-03 DIAGNOSIS — M51.37 DEGENERATION OF LUMBAR OR LUMBOSACRAL INTERVERTEBRAL DISC: Primary | ICD-10-CM

## 2022-11-03 DIAGNOSIS — E66.3 OVERWEIGHT WITH BODY MASS INDEX (BMI) OF 25 TO 25.9 IN ADULT: ICD-10-CM

## 2022-11-03 PROBLEM — M51.379 DEGENERATION OF LUMBAR OR LUMBOSACRAL INTERVERTEBRAL DISC: Status: ACTIVE | Noted: 2022-11-03

## 2022-11-03 PROCEDURE — 99203 OFFICE O/P NEW LOW 30 MIN: CPT | Performed by: NEUROLOGICAL SURGERY

## 2022-11-03 RX ORDER — BUSPIRONE HYDROCHLORIDE 15 MG/1
TABLET ORAL
COMMUNITY
Start: 2022-10-14

## 2022-11-03 NOTE — PROGRESS NOTES
Patient: Viktor Mendez  : 1960    Primary Care Provider: Kaz Churchill DO    Requesting Provider: Kaz Churchill,*        History    Chief Complaint: Back pain  Chief Complaint   Patient presents with   • Back Pain     NEW PATIENT/DR CHURCHILL: patient's imaging @ [facility] ([facility]) on PACS  Patient states pain is not constant, not daily.  He has done therapy in the past but nothing recent.  He has never had PMI.           History of Present Illness: 62-year-old gentleman with a longstanding history of back pain.  He describes 2 or 3 episodes a year where he will a lift or twist and then have a severe exacerbation of his back pain for about 2 weeks.  Right now is really not having any meaningful back pain.  He did physical therapy for his low back he said about 7 months ago but because he can go a few months without any back pain issues at all the therapy was not very effective.  He denies any lower extremity pain numbness or tingling.  He works as is an  and tow truck business.  He has worked with a chiropractor in the past.  He really does not take any pain medication.  He also uses an inversion table at home.    Review of Systems   Musculoskeletal: Positive for arthralgias and back pain.   All other systems reviewed and are negative.      Past Medical History:   Past Medical History:   Diagnosis Date   • Anxiety    • Asthma    • Atrial fibrillation (HCC)    • DDD (degenerative disc disease), lumbar    • Essential hypertension 2021   • GERD (gastroesophageal reflux disease)    • Glaucoma    • Hepatitis C    • Kidney cysts    • Low back pain        Past Surgical History:   Past Surgical History:   Procedure Laterality Date   • COLONOSCOPY     • COLONOSCOPY N/A 2022    Procedure: COLONOSCOPY WITH ANESTHESIA;  Surgeon: Teja Parker MD;  Location: [facility] ENDOSCOPY;  Service: Gastroenterology;  Laterality: N/A;  pre RUQ pain; hx colon  polyp  post; polyps   Kaz Galindo DO   • COLOSTOMY     • COLOSTOMY REVISION     • CYSTOSCOPY BLADDER BIOPSY N/A 9/27/2022    Procedure: CYSTOSCOPY BLADDER BIOPSY, BLADDER HYDRODISTENSION;  Surgeon: Ike Melendez MD;  Location:  [facility] OR;  Service: Urology;  Laterality: N/A;   • CYSTOSCOPY BLADDER HYDRODISTENSION N/A 9/27/2022    Procedure: BLADDER HYDRODISTENSION;  Surgeon: Ike Melendez MD;  Location:  [facility] OR;  Service: Urology;  Laterality: N/A;   • ENDOSCOPY     • ENDOSCOPY N/A 03/21/2022    Procedure: ESOPHAGOGASTRODUODENOSCOPY WITH ANESTHESIA;  Surgeon: Teja Parker MD;  Location:  [facility] ENDOSCOPY;  Service: Gastroenterology;  Laterality: N/A;  pre GERD  post; normal   Kaz Galindo DO   • EYE SURGERY         Family History: family history includes Diabetes in his mother; No Known Problems in his father.    Social History:  reports that he has quit smoking. His smoking use included cigarettes. He has a 60.00 pack-year smoking history. He has never used smokeless tobacco. He reports current alcohol use. He reports current drug use. Drug: Marijuana.    Medications:  (Not in a hospital admission)      Allergies:  Patient has no known allergies.    Physical Exam:     Physical Exam  Eyes:      Extraocular Movements: EOM normal.      Pupils: Pupils are equal, round, and reactive to light.   Cardiovascular:      Rate and Rhythm: Normal rate and regular rhythm.   Pulmonary:      Effort: No respiratory distress.      Breath sounds: Normal breath sounds.   Abdominal:      General: There is no distension.      Palpations: Abdomen is soft.   Neurological:      Mental Status: He is oriented to person, place, and time.      Motor: Motor strength is normal.      Coordination: Finger-Nose-Finger Test normal.      Gait: Gait is intact.   Psychiatric:         Speech: Speech normal.         Neurologic Exam     Mental Status   Oriented to person, place, and time.   Attention: normal.    Speech: speech is normal   Level of consciousness: alert  Knowledge: good.     Cranial Nerves     CN II   Visual fields full to confrontation.     CN III, IV, VI   Pupils are equal, round, and reactive to light.  Extraocular motions are normal.     CN V   Facial sensation intact.     CN VII   Facial expression full, symmetric.     CN VIII   CN VIII normal.     CN IX, X   CN IX normal.   CN X normal.     CN XI   CN XI normal.     CN XII   CN XII normal.     Motor Exam   Muscle bulk: normal  Overall muscle tone: normal  Right arm pronator drift: absent  Left arm pronator drift: absent    Strength   Strength 5/5 throughout.     Sensory Exam   Light touch normal.   Pinprick normal.     Gait, Coordination, and Reflexes     Gait  Gait: normal    Coordination   Finger to nose coordination: normal    Tremor   Resting tremor: absent  Intention tremor: absent  Action tremor: absent    Reflexes   Reflexes 2+ except as noted.         Independent Review of Radiographic Studies:   Plain x-rays and MRI of the lumbar spine show mild degenerative changes no significant neuroforaminal compromise or compression.    ASSESSMENT/PLAN: The patient describes 2 or 3 acute exacerbations of back pain during the course of the year.  This is almost certainly a myofascial pain syndrome.  muscle tension, spasms and inflammation of the connective tissues of his low back.  He has not required any surgical intervention.  Should he get another exacerbation I would recommend acute intervention with steroids muscle relaxer, compounding cream and chiropractic visits.  We discussed this at length.  His questions and concerns were addressed.  1. Degeneration of lumbar or lumbosacral intervertebral disc    2. Former smoker    3. Overweight with body mass index (BMI) of 25 to 25.9 in adult          Return if symptoms worsen or fail to improve.      Wilver Stringer MD

## 2022-11-03 NOTE — PATIENT INSTRUCTIONS
"PATIENT TO CONTINUE TO FOLLOW UP WITH HIS PRIMARY CARE PROVIDER FOR YEARLY PHYSICAL EXAMS TO ENSURE COMPLETE HEALTH MAINTENANCE      BMI for Adults  What is BMI?  Body mass index (BMI) is a number that is calculated from a person's weight and height. BMI can help estimate how much of a person's weight is composed of fat. BMI does not measure body fat directly. Rather, it is an alternative to procedures that directly measure body fat, which can be difficult and expensive.  BMI can help identify people who may be at higher risk for certain medical problems.  What are BMI measurements used for?  BMI is used as a screening tool to identify possible weight problems. It helps determine whether a person is obese, overweight, a healthy weight, or underweight.  BMI is useful for:  Identifying a weight problem that may be related to a medical condition or may increase the risk for medical problems.  Promoting changes, such as changes in diet and exercise, to help reach a healthy weight. BMI screening can be repeated to see if these changes are working.  How is BMI calculated?  BMI involves measuring your weight in relation to your height. Both height and weight are measured, and the BMI is calculated from those numbers. This can be done either in English (U[place] or metric measurements. Note that charts and online BMI calculators are available to help you find your BMI quickly and easily without having to do these calculations yourself.  To calculate your BMI in English (U[place] measurements:    Measure your weight in pounds (lb).  Multiply the number of pounds by 703.  For example, for a person who weighs 180 lb, multiply that number by 703, which equals 126,540.  Measure your height in inches. Then multiply that number by itself to get a measurement called \"inches squared.\"  For example, for a person who is 70 inches tall, the \"inches squared\" measurement is 70 inches x 70 inches, which equals 4,900 inches squared.  Divide the " "total from step 2 (number of lb x 703) by the total from step 3 (inches squared): 126,540 ÷ 4,900 = 25.8. This is your BMI.  To calculate your BMI in metric measurements:  Measure your weight in kilograms (kg).  Measure your height in meters (m). Then multiply that number by itself to get a measurement called \"meters squared.\"  For example, for a person who is 1.75 m tall, the \"meters squared\" measurement is 1.75 m x 1.75 m, which is equal to 3.1 meters squared.  Divide the number of kilograms (your weight) by the meters squared number. In this example: 70 ÷ 3.1 = 22.6. This is your BMI.  What do the results mean?  BMI charts are used to identify whether you are underweight, normal weight, overweight, or obese. The following guidelines will be used:  Underweight: BMI less than 18.5.  Normal weight: BMI between 18.5 and 24.9.  Overweight: BMI between 25 and 29.9.  Obese: BMI of 30 or above.  Keep these notes in mind:  Weight includes both fat and muscle, so someone with a muscular build, such as an athlete, may have a BMI that is higher than 24.9. In cases like these, BMI is not an accurate measure of body fat.  To determine if excess body fat is the cause of a BMI of 25 or higher, further assessments may need to be done by a health care provider.  BMI is usually interpreted in the same way for men and women.  Where to find more information  For more information about BMI, including tools to quickly calculate your BMI, go to these websites:  Centers for Disease Control and Prevention: www.cdc.gov  American Heart Association: www.heart.org  National Heart, Lung, and Blood [place]: www.nhlbi.nih.gov  Summary  Body mass index (BMI) is a number that is calculated from a person's weight and height.  BMI may help estimate how much of a person's weight is composed of fat. BMI can help identify those who may be at higher risk for certain medical problems.  BMI can be measured using English measurements or metric " "measurements.  BMI charts are used to identify whether you are underweight, normal weight, overweight, or obese.  This information is not intended to replace advice given to you by your health care provider. Make sure you discuss any questions you have with your health care provider.  Document Revised: 09/09/2020 Document Reviewed: 07/17/2020  [employer] Patient Education © 2022 [employer]  DASH Eating Plan  DASH stands for Dietary Approaches to Stop Hypertension. The DASH eating plan is a healthy eating plan that has been shown to:  Reduce high blood pressure (hypertension).  Reduce your risk for type 2 diabetes, heart disease, and stroke.  Help with weight loss.  What are tips for following this plan?  Reading food labels  Check food labels for the amount of salt (sodium) per serving. Choose foods with less than 5 percent of the Daily Value of sodium. Generally, foods with less than 300 milligrams (mg) of sodium per serving fit into this eating plan.  To find whole grains, look for the word \"whole\" as the first word in the ingredient list.  Shopping  Buy products labeled as \"low-sodium\" or \"no salt added.\"  Buy fresh foods. Avoid canned foods and pre-made or frozen meals.  Cooking  Avoid adding salt when cooking. Use salt-free seasonings or herbs instead of table salt or sea salt. Check with your health care provider or pharmacist before using salt substitutes.  Do not waite foods. Cook foods using healthy methods such as baking, boiling, grilling, roasting, and broiling instead.  Cook with heart-healthy oils, such as olive, canola, avocado, soybean, or sunflower oil.  Meal planning    Eat a balanced diet that includes:  4 or more servings of fruits and 4 or more servings of vegetables each day. Try to fill one-half of your plate with fruits and vegetables.  6-8 servings of whole grains each day.  Less than 6 oz (170 g) of lean meat, poultry, or fish each day. A 3-oz (85-g) serving of meat is about the same size as " a deck of cards. One egg equals 1 oz (28 g).  2-3 servings of low-fat dairy each day. One serving is 1 cup (237 mL).  1 serving of nuts, seeds, or beans 5 times each week.  2-3 servings of heart-healthy fats. Healthy fats called omega-3 fatty acids are found in foods such as walnuts, flaxseeds, fortified milks, and eggs. These fats are also found in cold-water fish, such as sardines, salmon, and mackerel.  Limit how much you eat of:  Canned or prepackaged foods.  Food that is high in trans fat, such as some fried foods.  Food that is high in saturated fat, such as fatty meat.  Desserts and other sweets, sugary drinks, and other foods with added sugar.  Full-fat dairy products.  Do not salt foods before eating.  Do not eat more than 4 egg yolks a week.  Try to eat at least 2 vegetarian meals a week.  Eat more home-cooked food and less restaurant, buffet, and fast food.  Lifestyle  When eating at a restaurant, ask that your food be prepared with less salt or no salt, if possible.  If you drink alcohol:  Limit how much you use to:  0-1 drink a day for women who are not pregnant.  0-2 drinks a day for men.  Be aware of how much alcohol is in your drink. In the [place] one drink equals one 12 oz bottle of beer (355 mL), one 5 oz glass of wine (148 mL), or one 1½ oz glass of hard liquor (44 mL).  General information  Avoid eating more than 2,300 mg of salt a day. If you have hypertension, you may need to reduce your sodium intake to 1,500 mg a day.  Work with your health care provider to maintain a healthy body weight or to lose weight. Ask what an ideal weight is for you.  Get at least 30 minutes of exercise that causes your heart to beat faster (aerobic exercise) most days of the week. Activities may include walking, swimming, or biking.  Work with your health care provider or dietitian to adjust your eating plan to your individual calorie needs.  What foods should I eat?  Fruits  All fresh, dried, or frozen fruit. Canned  fruit in natural juice (without added sugar).  Vegetables  Fresh or frozen vegetables (raw, steamed, roasted, or grilled). Low-sodium or reduced-sodium tomato and vegetable juice. Low-sodium or reduced-sodium tomato sauce and tomato paste. Low-sodium or reduced-sodium canned vegetables.  Grains  Whole-grain or whole-wheat bread. Whole-grain or whole-wheat pasta. Brown rice. Oatmeal. Quinoa. Bulgur. Whole-grain and low-sodium cereals. Zoë bread. Low-fat, low-sodium crackers. Whole-wheat flour tortillas.  Meats and other proteins  Skinless chicken or turkey. Ground chicken or turkey. Pork with fat trimmed off. Fish and seafood. Egg whites. Dried beans, peas, or lentils. Unsalted nuts, nut butters, and seeds. Unsalted canned beans. Lean cuts of beef with fat trimmed off. Low-sodium, lean precooked or cured meat, such as sausages or meat loaves.  Dairy  Low-fat (1%) or fat-free (skim) milk. Reduced-fat, low-fat, or fat-free cheeses. Nonfat, low-sodium ricotta or cottage cheese. Low-fat or nonfat yogurt. Low-fat, low-sodium cheese.  Fats and oils  Soft margarine without trans fats. Vegetable oil. Reduced-fat, low-fat, or light mayonnaise and salad dressings (reduced-sodium). Canola, safflower, olive, avocado, soybean, and sunflower oils. Avocado.  Seasonings and condiments  Herbs. Spices. Seasoning mixes without salt.  Other foods  Unsalted popcorn and pretzels. Fat-free sweets.  The items listed above may not be a complete list of foods and beverages you can eat. Contact a dietitian for more information.  What foods should I avoid?  Fruits  Canned fruit in a light or heavy syrup. Fried fruit. Fruit in cream or butter sauce.  Vegetables  Creamed or fried vegetables. Vegetables in a cheese sauce. Regular canned vegetables (not low-sodium or reduced-sodium). Regular canned tomato sauce and paste (not low-sodium or reduced-sodium). Regular tomato and vegetable juice (not low-sodium or reduced-sodium). Pickles.  Olives.  Grains  Baked goods made with fat, such as croissants, muffins, or some breads. Dry pasta or rice meal packs.  Meats and other proteins  Fatty cuts of meat. Ribs. Fried meat. Dc. Bologna, salami, and other precooked or cured meats, such as sausages or meat loaves. Fat from the back of a pig (fatback). Bratwurst. Salted nuts and seeds. Canned beans with added salt. Canned or smoked fish. Whole eggs or egg yolks. Chicken or turkey with skin.  Dairy  Whole or 2% milk, cream, and half-and-half. Whole or full-fat cream cheese. Whole-fat or sweetened yogurt. Full-fat cheese. Nondairy creamers. Whipped toppings. Processed cheese and cheese spreads.  Fats and oils  Butter. Stick margarine. Lard. Shortening. Ghee. Dc fat. Tropical oils, such as coconut, palm kernel, or palm oil.  Seasonings and condiments  Onion salt, garlic salt, seasoned salt, table salt, and sea salt. [facility] Hospital[facility] sauce. Tartar sauce. Barbecue sauce. Teriyaki sauce. Soy sauce, including reduced-sodium. Steak sauce. Canned and packaged gravies. Fish sauce. Oyster sauce. Cocktail sauce. Store-bought horseradish. Ketchup. Mustard. Meat flavorings and tenderizers. Bouillon cubes. Hot sauces. Pre-made or packaged marinades. Pre-made or packaged taco seasonings. Relishes. Regular salad dressings.  Other foods  Salted popcorn and pretzels.  The items listed above may not be a complete list of foods and beverages you should avoid. Contact a dietitian for more information.  Where to find more information  National Heart, Lung, and Blood [place]: www.nhlbi.nih.gov  American Heart Association: www.heart.org  Academy of Nutrition and Dietetics: www.eatright.org  National Kidney Foundation: www.kidney.org  Summary  The DASH eating plan is a healthy eating plan that has been shown to reduce high blood pressure (hypertension). It may also reduce your risk for type 2 diabetes, heart disease, and stroke.  When on the DASH eating plan, aim to eat more  fresh fruits and vegetables, whole grains, lean proteins, low-fat dairy, and heart-healthy fats.  With the DASH eating plan, you should limit salt (sodium) intake to 2,300 mg a day. If you have hypertension, you may need to reduce your sodium intake to 1,500 mg a day.  Work with your health care provider or dietitian to adjust your eating plan to your individual calorie needs.  This information is not intended to replace advice given to you by your health care provider. Make sure you discuss any questions you have with your health care provider.  Document Revised: 11/20/2020 Document Reviewed: 11/20/2020  [employer] Patient Education © 2022 [employer]

## 2022-11-08 ENCOUNTER — OFFICE VISIT (OUTPATIENT)
Dept: UROLOGY | Facility: CLINIC | Age: 62
End: 2022-11-08

## 2022-11-08 VITALS — HEIGHT: 74 IN | WEIGHT: 200.4 LBS | BODY MASS INDEX: 25.72 KG/M2 | TEMPERATURE: 98.1 F

## 2022-11-08 DIAGNOSIS — R35.0 URINE FREQUENCY: ICD-10-CM

## 2022-11-08 DIAGNOSIS — N32.9 LESION OF URINARY BLADDER: Primary | ICD-10-CM

## 2022-11-08 LAB
BILIRUB BLD-MCNC: NEGATIVE MG/DL
CLARITY, POC: CLEAR
COLOR UR: YELLOW
GLUCOSE UR STRIP-MCNC: NEGATIVE MG/DL
KETONES UR QL: NEGATIVE
LEUKOCYTE EST, POC: NEGATIVE
NITRITE UR-MCNC: NEGATIVE MG/ML
PH UR: 6 [PH] (ref 5–8)
PROT UR STRIP-MCNC: NEGATIVE MG/DL
RBC # UR STRIP: NEGATIVE /UL
SP GR UR: 1.02 (ref 1–1.03)
UROBILINOGEN UR QL: NORMAL

## 2022-11-08 PROCEDURE — 99213 OFFICE O/P EST LOW 20 MIN: CPT

## 2023-01-26 ENCOUNTER — OFFICE VISIT (OUTPATIENT)
Dept: CARDIOLOGY | Facility: CLINIC | Age: 63
End: 2023-01-26
Payer: COMMERCIAL

## 2023-01-26 VITALS
HEIGHT: 74 IN | OXYGEN SATURATION: 97 % | HEART RATE: 64 BPM | DIASTOLIC BLOOD PRESSURE: 86 MMHG | SYSTOLIC BLOOD PRESSURE: 134 MMHG | BODY MASS INDEX: 25.82 KG/M2 | WEIGHT: 201.2 LBS

## 2023-01-26 DIAGNOSIS — Z79.01 CURRENT USE OF LONG TERM ANTICOAGULATION: ICD-10-CM

## 2023-01-26 DIAGNOSIS — I10 ESSENTIAL HYPERTENSION: ICD-10-CM

## 2023-01-26 DIAGNOSIS — I48.0 PAROXYSMAL ATRIAL FIBRILLATION: Primary | ICD-10-CM

## 2023-01-26 DIAGNOSIS — I47.1 PSVT (PAROXYSMAL SUPRAVENTRICULAR TACHYCARDIA): ICD-10-CM

## 2023-01-26 PROCEDURE — 93000 ELECTROCARDIOGRAM COMPLETE: CPT | Performed by: NURSE PRACTITIONER

## 2023-01-26 PROCEDURE — 99214 OFFICE O/P EST MOD 30 MIN: CPT | Performed by: NURSE PRACTITIONER

## 2023-01-26 RX ORDER — CETIRIZINE HYDROCHLORIDE 10 MG/1
10 TABLET ORAL DAILY
COMMUNITY
Start: 2022-12-08

## 2023-01-26 NOTE — PROGRESS NOTES
Subjective:     Encounter Date: 01/26/2023      Patient ID: Viktor Mendez is a 62 y.o. male with paroxysmal atrial fibrillation on chronic anticoagulation, hypertension, colostomy, bradycardia, paroxysmal supraventricular tachycardia and hepatitis C    Chief Complaint: 3 month follow up  Atrial Fibrillation  Presents for follow-up visit. Symptoms are negative for bradycardia, chest pain, dizziness, hemodynamic instability, hypertension, hypotension, palpitations, shortness of breath, syncope, tachycardia and weakness. The symptoms have been stable. Past medical history includes atrial fibrillation. There are no medication compliance problems.   Hypertension  The current episode started more than 1 year ago. The problem is controlled. Pertinent negatives include no anxiety, chest pain, malaise/fatigue, orthopnea, palpitations, peripheral edema, PND or shortness of breath. Risk factors for coronary artery disease include male gender. Current antihypertension treatment includes beta blockers.     Patient presents today for management of paroxysmal atrial fibrillation. Patient underwent cardioversion on 8/19/2022 that was successful. LOV he was doing well.   Today patient reports that he has been doing well. He denies any chest pain. He reports that he has been having dyspnea on exertion. He reports that he has been coughing and congestion for the past month. He states that he was tested for flu and COVID. He has been treated with antibiotics and steroids that had him feeling better for a week. He reports that he started back to work this week and he has been fatigued. He denies any heart racing or palpitations. He denies any leg swelling, orthopnea or PND. He reports that he doesn't monitor his BP at home. He denies any dizziness or near syncope. Patient denies any Xarelto and denies any bleeding. He follows with Dr Galindo as PCP    The following portions of the patient's history were reviewed and updated as  appropriate: allergies, current medications, past family history, past medical history, past social history, past surgical history and problem list.    No Known Allergies    Current Outpatient Medications:   •  Acetaminophen (TYLENOL PO), Take 2 tablets by mouth As Needed., Disp: , Rfl:   •  busPIRone (BUSPAR) 15 MG tablet, TAKE 1 TABLET BY MOUTH IN THE MORNING AND AT BEDTIME, Disp: , Rfl:   •  cetirizine (zyrTEC) 10 MG tablet, Take 10 mg by mouth Daily., Disp: , Rfl:   •  citalopram (CeleXA) 20 MG tablet, Take 20 mg by mouth Daily., Disp: , Rfl:   •  cyclobenzaprine (FLEXERIL) 5 MG tablet, Take 5 mg by mouth 3 (Three) Times a Day As Needed for Muscle Spasms., Disp: , Rfl:   •  docusate sodium (Colace) 100 MG capsule, Take 1 capsule by mouth 2 (Two) Times a Day., Disp: 60 capsule, Rfl: 1  •  dorzolamide-timolol (COSOPT) 22.3-6.8 MG/ML ophthalmic solution, Administer 1 drop to both eyes Every Morning., Disp: , Rfl:   •  hydrOXYzine (ATARAX) 10 MG tablet, Take 10 mg by mouth Every 6 (Six) Hours As Needed for Anxiety., Disp: , Rfl:   •  latanoprost (XALATAN) 0.005 % ophthalmic solution, Administer 1 drop to both eyes Every Night., Disp: , Rfl:   •  lisinopril (PRINIVIL,ZESTRIL) 10 MG tablet, Take 5 mg by mouth Daily., Disp: , Rfl:   •  nitrofurantoin, macrocrystal-monohydrate, (Macrobid) 100 MG capsule, Take 1 capsule by mouth 2 (Two) Times a Day., Disp: 6 capsule, Rfl: 0  •  oxybutynin (DITROPAN) 5 MG tablet, Take 1 tablet by mouth Every 8 (Eight) Hours As Needed (bladder spasms)., Disp: 30 tablet, Rfl: 1  •  pantoprazole (PROTONIX) 40 MG EC tablet, Take 1 tablet by mouth Daily. (Patient taking differently: Take 40 mg by mouth Daily As Needed (heartburn).), Disp: 30 tablet, Rfl: 11  •  phenazopyridine (PYRIDIUM) 100 MG tablet, Take 1 tablet by mouth 3 (Three) Times a Day As Needed (urinary burning)., Disp: 20 tablet, Rfl: 1  •  rivaroxaban (XARELTO) 20 MG tablet, Take 20 mg by mouth Daily., Disp: , Rfl:   •   tamsulosin (FLOMAX) 0.4 MG capsule 24 hr capsule, Take 1 capsule by mouth Every Night for 360 days., Disp: 30 capsule, Rfl: 11  Past Medical History:   Diagnosis Date   • Anxiety    • Asthma    • Atrial fibrillation (HCC)    • DDD (degenerative disc disease), lumbar    • Essential hypertension 03/11/2021   • GERD (gastroesophageal reflux disease)    • Glaucoma    • Hepatitis C    • Kidney cysts    • Low back pain    • Urinary incontinence 2022     Social History     Socioeconomic History   • Marital status:    Tobacco Use   • Smoking status: Former     Packs/day: 2.00     Years: 30.00     Pack years: 60.00     Types: Cigarettes   • Smokeless tobacco: Never   • Tobacco comments:     QUIT 10 YEARS AGO    Vaping Use   • Vaping Use: Never used   Substance and Sexual Activity   • Alcohol use: Not Currently     Comment: occ   • Drug use: Yes     Types: Marijuana   • Sexual activity: Yes     Partners: Female       Review of Systems   Constitutional: Negative for malaise/fatigue.   HENT: Negative for nosebleeds.    Cardiovascular: Positive for dyspnea on exertion. Negative for chest pain, irregular heartbeat, leg swelling, near-syncope, orthopnea, palpitations, paroxysmal nocturnal dyspnea and syncope.   Respiratory: Positive for cough. Negative for shortness of breath.    Hematologic/Lymphatic: Bruises/bleeds easily.   Genitourinary: Negative for hematuria.   Neurological: Negative for dizziness and weakness.   All other systems reviewed and are negative.         Objective:     Vitals reviewed.   Constitutional:       General: Not in acute distress.     Appearance: Normal appearance. Well-developed.   Eyes:      Pupils: Pupils are equal, round, and reactive to light.   HENT:      Head: Normocephalic and atraumatic.      Nose: Nose normal.   Neck:      Vascular: No carotid bruit.   Pulmonary:      Effort: Pulmonary effort is normal. No respiratory distress.      Breath sounds: Normal breath sounds. No wheezing. No  "rales.   Cardiovascular:      Normal rate. Regular rhythm.      Murmurs: There is a grade 2/6 systolic murmur.   Edema:     Peripheral edema absent.   Abdominal:      General: There is no distension.      Palpations: Abdomen is soft.   Musculoskeletal: Normal range of motion.      Cervical back: Normal range of motion and neck supple. Skin:     General: Skin is warm.      Findings: No erythema or rash.   Neurological:      General: No focal deficit present.      Mental Status: Alert and oriented to person, place, and time.   Psychiatric:         Attention and Perception: Attention normal.         Mood and Affect: Mood normal.         Speech: Speech normal.         Behavior: Behavior normal.         Thought Content: Thought content normal.         Judgment: Judgment normal.         /86   Pulse 64   Ht 188 cm (74\")   Wt 91.3 kg (201 lb 3.2 oz)   SpO2 97%   BMI 25.83 kg/m²       ECG 12 Lead    Date/Time: 1/26/2023 10:17 AM  Performed by: Jose Sarabia APRN  Authorized by: Jose Sarabia APRN   Comparison: compared with previous ECG from 9/20/2022  Similar to previous ECG  Rhythm: sinus rhythm  Rate: normal  BPM: 62              Lab Review:     Holter monitor 7/18/2022:  Interpretation Summary     · Average HR: 109. Min HR: 50. Max HR: 233.     · Entire report was reviewed.  Monitoring in days as noted below  · In atrial fibrillation during entire monitoring duration  · Rare premature ventricular contractions with a PVC burden of: < 1%     · No correlated arrhythmia  · No significant pauses                  Conclusion:    In atrial fibrillation during entire monitoring duration  Intermittent increase in ventricular rate  Rates between  bpm average 109 bpm  Slowest rate of 50 bpm while in atrial fibrillation at 5:09 AM presumably during sleep  Single 5 beat run of nonsustained ventricular tachycardia at a maximum rate of 156 bpm and average rate of 136 bpm     Cardioversion 8/15/2022: "   Interpretation Summary  ·  Post cardioversion the patient displayed a sinus rhythm.  · The cardioversion was successful.         Results for orders placed during the hospital encounter of 08/25/22    Adult Transthoracic Echo Complete w/ Color, Spectral and Contrast if necessary per protocol    Interpretation Summary  · Calculated left ventricular 3D EF = 61% Left ventricular ejection fraction appears to be 56 - 60%. Left ventricular systolic function is normal.  · Left ventricular diastolic function was normal.  · Left atrial volume is severely increased.  · Estimated right ventricular systolic pressure from tricuspid regurgitation is normal (<35 mmHg).      I have personally reviewed holter monitor, past office notes, echo and cardioversion report prior to patients visit  Assessment:          Diagnosis Plan   1. Paroxysmal atrial fibrillation (HCC)  ECG 12 Lead      2. Current use of long term anticoagulation        3. PSVT (paroxysmal supraventricular tachycardia) (HCC)        4. Essential hypertension               Plan:       1. PAF: paroxysmal atrial fibrillation:s/p Cardioversion 8/19/2022.  EKG today shows sinus rhythm rate at 62. Discussed EP referral and patient would like to wait and see if atrial fibrillation recurs.     2. Chronic anticoagulation: [id number] score 1. Patient is on Xarelto and denies any bleeding issues.     3. PSVT: asymptomatic    4. Hypertension: well controlled. Continue current medications    I attest that all portions of this note reviewed and all information has been updated by myself to reflect the patient's current status.      I spent 35 minutes caring for Viktor on this date of service. This time includes time spent by me in the following activities:preparing for the visit, reviewing tests, obtaining and/or reviewing a separately obtained history, performing a medically appropriate examination and/or evaluation , counseling and educating the patient/family/caregiver, ordering  medications, tests, or procedures and documenting information in the medical record    I spent 2 minutes on the separately reported service of EKG. This time is not included in the time used to support the E/M service also reported today.    Patient is to follow up in 6  months or sooner if needed

## 2023-02-02 ENCOUNTER — TELEPHONE (OUTPATIENT)
Dept: CARDIOLOGY | Facility: CLINIC | Age: 63
End: 2023-02-02
Payer: COMMERCIAL

## 2023-02-02 NOTE — TELEPHONE ENCOUNTER
Patient states that he is taking Xarelto and has been without missing for months. He states that he now uses [facility] in [facility] if we send any refills. He's unsure of how many refills he has available.

## 2023-02-02 NOTE — TELEPHONE ENCOUNTER
I attempted to reach patient to confirm if he is currently taking his Xarelto 20 mg tablet. His pharmacy had informed us that medication hadn't been picked up since September 2022.     Okay for [facility]UB to read.    Thank you  WF

## 2023-05-05 NOTE — PROGRESS NOTES
Subjective    Mr. Mendez is 63 y.o. male    Chief Complaint: f/u bothersome LUTS    History of Present Illness     63-year-old male established patient in for follow-up regarding bothersome LUTS.  Patient underwent hydrodistention by Dr. Melendez 9/2022 after patient was found to have small erythematous bladder lesion along the posterior bladder wall during cystoscopy evaluation patient later went for biopsy and hydrodistention.  Tissue pathology was negative for malignancy.  Patient reports voiding symptoms have been much improved since.  Remains on tamsulosin 0.4 mg daily.    The following portions of the patient's history were reviewed and updated as appropriate: allergies, current medications, past family history, past medical history, past social history, past surgical history and problem list.    Review of Systems   Constitutional: Negative for chills and fever.   Gastrointestinal: Negative for abdominal pain, anal bleeding, blood in stool, nausea and vomiting.   Genitourinary: Negative for decreased urine volume, difficulty urinating, dysuria, flank pain, frequency, hematuria and urgency.         Current Outpatient Medications:   •  Acetaminophen (TYLENOL PO), Take 2 tablets by mouth As Needed., Disp: , Rfl:   •  busPIRone (BUSPAR) 15 MG tablet, TAKE 1 TABLET BY MOUTH IN THE MORNING AND AT BEDTIME, Disp: , Rfl:   •  cetirizine (zyrTEC) 10 MG tablet, Take 1 tablet by mouth Daily., Disp: , Rfl:   •  citalopram (CeleXA) 20 MG tablet, Take 1 tablet by mouth Daily., Disp: , Rfl:   •  cyclobenzaprine (FLEXERIL) 5 MG tablet, Take 1 tablet by mouth 3 (Three) Times a Day As Needed for Muscle Spasms., Disp: , Rfl:   •  docusate sodium (Colace) 100 MG capsule, Take 1 capsule by mouth 2 (Two) Times a Day., Disp: 60 capsule, Rfl: 1  •  dorzolamide-timolol (COSOPT) 22.3-6.8 MG/ML ophthalmic solution, Administer 1 drop to both eyes Every Morning., Disp: , Rfl:   •  hydrOXYzine (ATARAX) 10 MG tablet, Take 1 tablet by mouth  Every 6 (Six) Hours As Needed for Anxiety., Disp: , Rfl:   •  latanoprost (XALATAN) 0.005 % ophthalmic solution, Administer 1 drop to both eyes Every Night., Disp: , Rfl:   •  lisinopril (PRINIVIL,ZESTRIL) 10 MG tablet, Take 5 mg by mouth Daily., Disp: , Rfl:   •  oxybutynin (DITROPAN) 5 MG tablet, Take 1 tablet by mouth Every 8 (Eight) Hours As Needed (bladder spasms)., Disp: 30 tablet, Rfl: 1  •  pantoprazole (PROTONIX) 40 MG EC tablet, Take 1 tablet by mouth Daily. (Patient taking differently: Take 1 tablet by mouth Daily As Needed (heartburn).), Disp: 30 tablet, Rfl: 11  •  phenazopyridine (PYRIDIUM) 100 MG tablet, Take 1 tablet by mouth 3 (Three) Times a Day As Needed (urinary burning)., Disp: 20 tablet, Rfl: 1  •  rivaroxaban (XARELTO) 20 MG tablet, Take 1 tablet by mouth Daily., Disp: 90 tablet, Rfl: 3  •  tamsulosin (FLOMAX) 0.4 MG capsule 24 hr capsule, Take 1 capsule by mouth Every Night for 360 days., Disp: 90 capsule, Rfl: 3    Past Medical History:   Diagnosis Date   • Anxiety    • Asthma    • Atrial fibrillation    • DDD (degenerative disc disease), lumbar    • Essential hypertension 03/11/2021   • GERD (gastroesophageal reflux disease)    • Glaucoma    • Hepatitis C    • Kidney cysts    • Low back pain    • Urinary incontinence 2022       Past Surgical History:   Procedure Laterality Date   • COLONOSCOPY     • COLONOSCOPY N/A 03/21/2022    Procedure: COLONOSCOPY WITH ANESTHESIA;  Surgeon: Teja Parker MD;  Location: [facility] ENDOSCOPY;  Service: Gastroenterology;  Laterality: N/A;  pre RUQ pain; hx colon polyp  post; polyps   Kaz Galindo DO   • COLOSTOMY     • COLOSTOMY REVISION     • CYSTOSCOPY BLADDER BIOPSY N/A 09/27/2022    Procedure: CYSTOSCOPY BLADDER BIOPSY, BLADDER HYDRODISTENSION;  Surgeon: Ike Melendez MD;  Location: [facility] OR;  Service: Urology;  Laterality: N/A;   • CYSTOSCOPY BLADDER HYDRODISTENSION N/A 09/27/2022    Procedure: BLADDER HYDRODISTENSION;  Surgeon:  "Ike Melendez MD;  Location: [facility] OR;  Service: Urology;  Laterality: N/A;   • ENDOSCOPY     • ENDOSCOPY N/A 03/21/2022    Procedure: ESOPHAGOGASTRODUODENOSCOPY WITH ANESTHESIA;  Surgeon: Teja Parker MD;  Location: [facility] ENDOSCOPY;  Service: Gastroenterology;  Laterality: N/A;  pre GERD  post; normal   Kaz Galindo, DO   • EYE SURGERY         Social History     Socioeconomic History   • Marital status:    Tobacco Use   • Smoking status: Former     Packs/day: 2.00     Years: 30.00     Pack years: 60.00     Types: Cigarettes   • Smokeless tobacco: Never   • Tobacco comments:     QUIT 10 YEARS AGO    Vaping Use   • Vaping Use: Never used   Substance and Sexual Activity   • Alcohol use: Not Currently     Comment: occ   • Drug use: Yes     Types: Marijuana   • Sexual activity: Yes     Partners: Female       Family History   Problem Relation Age of Onset   • Diabetes Mother    • No Known Problems Father    • Colon cancer Neg Hx    • Colon polyps Neg Hx    • Esophageal cancer Neg Hx        Objective    Temp 98.1 °F (36.7 °C)   Ht 188 cm (74\")   Wt 85.6 kg (188 lb 12.8 oz)   BMI 24.24 kg/m²     Physical Exam  Constitutional:       Appearance: Normal appearance.   Abdominal:      Tenderness: There is no right CVA tenderness or left CVA tenderness.   Skin:     General: Skin is warm and dry.   Neurological:      Mental Status: He is alert and oriented to person, place, and time.   Psychiatric:         Mood and Affect: Mood normal.         Behavior: Behavior normal.             Results for orders placed or performed in visit on 05/09/23   POC Urinalysis Dipstick, Multipro    Specimen: Urine   Result Value Ref Range    Color Yellow Yellow, Straw, Dark Yellow, Pretty    Clarity, UA Clear Clear    Glucose, UA Negative Negative mg/dL    Bilirubin Negative Negative    Ketones, UA Negative Negative    Specific Gravity  1.010 1.005 - 1.030    Blood, UA Negative Negative    pH, Urine 5.5 5.0 - 8.0    " Protein, POC Negative Negative mg/dL    Urobilinogen, UA 0.2 E.U./dL Normal, 0.2 E.U./dL    Nitrite, UA Negative Negative    Leukocytes Negative Negative     Assessment and Plan    Diagnoses and all orders for this visit:    1. Lesion of urinary bladder (Primary)  -     POC Urinalysis Dipstick, Multipro    2. Urine frequency  -     POC Urinalysis Dipstick, Multipro  -     tamsulosin (FLOMAX) 0.4 MG capsule 24 hr capsule; Take 1 capsule by mouth Every Night for 360 days.  Dispense: 90 capsule; Refill: 3      63-year-old male established patient in for follow-up regarding bothersome LUTS.  Patient underwent hydrodistention by Dr. Melendez 9/2022 after patient was found to have small erythematous bladder lesion along the posterior bladder wall during cystoscopy evaluation patient later went for biopsy and hydrodistention.     Remains on tamsulosin 0.4 mg daily.    Doing well following bladder hydrodistention voices no complaints    Continue tamsulosin 0.4 mg daily follow-up 1 year

## 2023-05-09 ENCOUNTER — OFFICE VISIT (OUTPATIENT)
Dept: UROLOGY | Facility: CLINIC | Age: 63
End: 2023-05-09
Payer: COMMERCIAL

## 2023-05-09 VITALS — HEIGHT: 74 IN | BODY MASS INDEX: 24.23 KG/M2 | TEMPERATURE: 98.1 F | WEIGHT: 188.8 LBS

## 2023-05-09 DIAGNOSIS — R35.0 URINE FREQUENCY: ICD-10-CM

## 2023-05-09 DIAGNOSIS — N32.9 LESION OF URINARY BLADDER: Primary | ICD-10-CM

## 2023-05-09 LAB
BILIRUB BLD-MCNC: NEGATIVE MG/DL
CLARITY, POC: CLEAR
COLOR UR: YELLOW
GLUCOSE UR STRIP-MCNC: NEGATIVE MG/DL
KETONES UR QL: NEGATIVE
LEUKOCYTE EST, POC: NEGATIVE
NITRITE UR-MCNC: NEGATIVE MG/ML
PH UR: 5.5 [PH] (ref 5–8)
PROT UR STRIP-MCNC: NEGATIVE MG/DL
RBC # UR STRIP: NEGATIVE /UL
SP GR UR: 1.01 (ref 1–1.03)
UROBILINOGEN UR QL: NORMAL

## 2023-05-09 RX ORDER — TAMSULOSIN HYDROCHLORIDE 0.4 MG/1
1 CAPSULE ORAL NIGHTLY
Qty: 90 CAPSULE | Refills: 3 | Status: SHIPPED | OUTPATIENT
Start: 2023-05-09 | End: 2024-05-03

## 2023-07-28 ENCOUNTER — OFFICE VISIT (OUTPATIENT)
Dept: CARDIOLOGY | Facility: CLINIC | Age: 63
End: 2023-07-28
Payer: COMMERCIAL

## 2023-07-28 VITALS
HEIGHT: 74 IN | HEART RATE: 52 BPM | SYSTOLIC BLOOD PRESSURE: 129 MMHG | DIASTOLIC BLOOD PRESSURE: 71 MMHG | BODY MASS INDEX: 24 KG/M2 | WEIGHT: 187 LBS

## 2023-07-28 DIAGNOSIS — Z86.19 HEPATITIS C VIRUS INFECTION CURED AFTER ANTIVIRAL DRUG THERAPY: ICD-10-CM

## 2023-07-28 DIAGNOSIS — I48.0 PAF (PAROXYSMAL ATRIAL FIBRILLATION): ICD-10-CM

## 2023-07-28 DIAGNOSIS — R06.09 DOE (DYSPNEA ON EXERTION): Primary | ICD-10-CM

## 2023-07-28 DIAGNOSIS — R00.1 SINUS BRADYCARDIA: ICD-10-CM

## 2023-07-28 DIAGNOSIS — Z87.891 FORMER SMOKER: ICD-10-CM

## 2023-07-28 DIAGNOSIS — I10 ESSENTIAL HYPERTENSION: Chronic | ICD-10-CM

## 2023-07-28 DIAGNOSIS — I47.1 PSVT (PAROXYSMAL SUPRAVENTRICULAR TACHYCARDIA): ICD-10-CM

## 2023-07-28 PROBLEM — E66.3 OVERWEIGHT WITH BODY MASS INDEX (BMI) OF 25 TO 25.9 IN ADULT: Status: RESOLVED | Noted: 2022-11-03 | Resolved: 2023-07-28

## 2023-07-28 PROCEDURE — 93000 ELECTROCARDIOGRAM COMPLETE: CPT | Performed by: INTERNAL MEDICINE

## 2023-07-28 PROCEDURE — 3078F DIAST BP <80 MM HG: CPT | Performed by: INTERNAL MEDICINE

## 2023-07-28 PROCEDURE — 1160F RVW MEDS BY RX/DR IN RCRD: CPT | Performed by: INTERNAL MEDICINE

## 2023-07-28 PROCEDURE — 99214 OFFICE O/P EST MOD 30 MIN: CPT | Performed by: INTERNAL MEDICINE

## 2023-07-28 PROCEDURE — 3074F SYST BP LT 130 MM HG: CPT | Performed by: INTERNAL MEDICINE

## 2023-07-28 PROCEDURE — 1159F MED LIST DOCD IN RCRD: CPT | Performed by: INTERNAL MEDICINE

## 2023-07-28 RX ORDER — FLUTICASONE PROPIONATE AND SALMETEROL 50; 250 UG/1; UG/1
1 POWDER RESPIRATORY (INHALATION) 2 TIMES DAILY
COMMUNITY
Start: 2023-07-13

## 2023-07-28 RX ORDER — ALBUTEROL SULFATE 90 UG/1
AEROSOL, METERED RESPIRATORY (INHALATION)
COMMUNITY
Start: 2023-07-24

## 2023-07-28 NOTE — PROGRESS NOTES
Viktor Mendez  [id number]  1960  63 y.o.  male    Referring Provider: Kaz Galindo DO    Reason for Follow-up Visit:   Routine follow up.   Low risk stress test with normal LVEF    Had periop paroxysmal atrial fibrillation during abdominal surgery in [place] for diverticulitis and colostomy  sinus bradycardia  now improved after changing eye drops  Was in ER for atrial fibrillation   Started on on anticoagulation  ECG today shows  sinus rhythm   Prior DC cardioversion       Subjective    Rare palpitations   Intermittent palpitations, once every several days to several weeks lasting for less than 1 minute  No associated symptoms of dizziness, weakness, chest pain,  shortness of breath      Overall feels the same   No new events or complaints since last visit   Overall the patient feels no major change from baseline symptoms   Similar symptoms as during last visit      Mild chronic exertional shortness of breath on exertion relieved with rest  No significant cough or wheezing    No associated chest pain  No significant pedal edema    No fever or chills  No significant expectoration    No hemoptysis  No presyncope or syncope    Tolerating current medications well with no untoward side effects   Compliant with prescribed medication regimen. Tries to adhere to cardiac diet.     No bleeding, excessive bruising, gait instability or fall risks    BP well controlled at home.       Chronic low back pain on muscle relaxants       History of present illness:  Viktor Mendez is a 63 y.o. yo male with paroxysmal atrial fibrillation who presents today for   Chief Complaint   Patient presents with    Atrial Fibrillation     6 mo f/u   .    History  Past Medical History:   Diagnosis Date    Anxiety     Asthma     Atrial fibrillation     DDD (degenerative disc disease), lumbar     Essential hypertension 03/11/2021    GERD (gastroesophageal reflux disease)     Glaucoma     Hepatitis C     Kidney cysts     Low back  pain     Urinary incontinence 2022   ,   Past Surgical History:   Procedure Laterality Date    COLONOSCOPY      COLONOSCOPY N/A 03/21/2022    Procedure: COLONOSCOPY WITH ANESTHESIA;  Surgeon: Teja Parker MD;  Location:  [facility];  Service: Gastroenterology;  Laterality: N/A;  pre RUQ pain; hx colon polyp  post; polyps   Kaz Galindo DO    COLOSTOMY      COLOSTOMY REVISION      CYSTOSCOPY BLADDER BIOPSY N/A 09/27/2022    Procedure: CYSTOSCOPY BLADDER BIOPSY, BLADDER HYDRODISTENSION;  Surgeon: Ike Melendez MD;  Location:  [facility];  Service: Urology;  Laterality: N/A;    CYSTOSCOPY BLADDER HYDRODISTENSION N/A 09/27/2022    Procedure: BLADDER HYDRODISTENSION;  Surgeon: Ike Melendez MD;  Location:  [facility];  Service: Urology;  Laterality: N/A;    ENDOSCOPY      ENDOSCOPY N/A 03/21/2022    Procedure: ESOPHAGOGASTRODUODENOSCOPY WITH ANESTHESIA;  Surgeon: Teja Parker MD;  Location:  [facility];  Service: Gastroenterology;  Laterality: N/A;  pre GERD  post; normal   Kaz Galindo DO    EYE SURGERY     ,   Family History   Problem Relation Age of Onset    Diabetes Mother     No Known Problems Father     Colon cancer Neg Hx     Colon polyps Neg Hx     Esophageal cancer Neg Hx    ,   Social History     Tobacco Use    Smoking status: Former     Packs/day: 2.00     Years: 30.00     Pack years: 60.00     Types: Cigarettes    Smokeless tobacco: Never    Tobacco comments:     QUIT 10 YEARS AGO    Vaping Use    Vaping Use: Never used   Substance Use Topics    Alcohol use: Not Currently     Comment: occ    Drug use: Yes     Types: Marijuana   ,     Medications  Current Outpatient Medications   Medication Sig Dispense Refill    Advair Diskus 250-50 MCG/ACT DISKUS Inhale 1 each 2 (Two) Times a Day.      albuterol sulfate  (90 Base) MCG/ACT inhaler INHALE 2 PUFFS BY MOUTH EVERY 4 HOURS AS NEEDED FOR WHEEZING FOR SHORTNESS OF BREATH      busPIRone (BUSPAR) 15 MG tablet  TAKE 1 TABLET BY MOUTH IN THE MORNING AND AT BEDTIME      citalopram (CeleXA) 20 MG tablet Take 1 tablet by mouth Daily.      dorzolamide-timolol (COSOPT) 22.3-6.8 MG/ML ophthalmic solution Administer 1 drop to both eyes Every Morning.      IBUPROFEN PO Take  by mouth Daily.      latanoprost (XALATAN) 0.005 % ophthalmic solution Administer 1 drop to both eyes Every Night.      lisinopril (PRINIVIL,ZESTRIL) 10 MG tablet Take 0.5 tablets by mouth Daily.      metoprolol tartrate (LOPRESSOR) 25 MG tablet Take 0.5 tablets by mouth 2 (Two) Times a Day.      rivaroxaban (XARELTO) 20 MG tablet Take 1 tablet by mouth Daily. 90 tablet 3    Acetaminophen (TYLENOL PO) Take 2 tablets by mouth As Needed. (Patient not taking: Reported on 7/28/2023)      cetirizine (zyrTEC) 10 MG tablet Take 1 tablet by mouth Daily. (Patient not taking: Reported on 7/28/2023)      cyclobenzaprine (FLEXERIL) 5 MG tablet Take 1 tablet by mouth 3 (Three) Times a Day As Needed for Muscle Spasms. (Patient not taking: Reported on 7/28/2023)      docusate sodium (Colace) 100 MG capsule Take 1 capsule by mouth 2 (Two) Times a Day. (Patient not taking: Reported on 7/28/2023) 60 capsule 1    hydrOXYzine (ATARAX) 10 MG tablet Take 1 tablet by mouth Every 6 (Six) Hours As Needed for Anxiety. (Patient not taking: Reported on 7/28/2023)      oxybutynin (DITROPAN) 5 MG tablet Take 1 tablet by mouth Every 8 (Eight) Hours As Needed (bladder spasms). (Patient not taking: Reported on 7/28/2023) 30 tablet 1    pantoprazole (PROTONIX) 40 MG EC tablet Take 1 tablet by mouth Daily. (Patient not taking: Reported on 7/28/2023) 30 tablet 11    phenazopyridine (PYRIDIUM) 100 MG tablet Take 1 tablet by mouth 3 (Three) Times a Day As Needed (urinary burning). (Patient not taking: Reported on 7/28/2023) 20 tablet 1    tamsulosin (FLOMAX) 0.4 MG capsule 24 hr capsule Take 1 capsule by mouth Every Night for 360 days. (Patient not taking: Reported on 7/28/2023) 90 capsule 3     No  "current facility-administered medications for this visit.   Holter  Average HR: 74. Min HR: 55. Max HR: 120.     The predominant rhythm noted during the testing period was sinus rhythm.  Premature atrial and ventricular contractions occured rarely.  Total ectopy burden during the monitoring period; PVCs:    9    and APCs:    91      5   supraventricular tachycardia episodes  longest  11     beats at maximum rate of  127      BPM      No significant  ventricular tachy or mary arrhythmia.  No correlated arrhythmia  No significant pauses   myocardial perfusion scan    Low risk stress test with normal LVEF     Allergies:  Patient has no known allergies.    Review of Systems  Review of Systems   Constitutional: Positive for decreased appetite. Negative for malaise/fatigue.   HENT: Negative.     Eyes: Negative.    Cardiovascular:  Positive for dyspnea on exertion. Negative for chest pain, claudication, cyanosis, irregular heartbeat, leg swelling, near-syncope, orthopnea, palpitations, paroxysmal nocturnal dyspnea and syncope.   Respiratory: Negative.     Endocrine: Negative.    Hematologic/Lymphatic: Negative.    Skin: Negative.    Musculoskeletal:  Positive for arthritis and back pain.   Gastrointestinal:  Negative for anorexia.   Genitourinary: Negative.    Neurological:  Negative for weakness.   Psychiatric/Behavioral: Negative.       Objective     Physical Exam:  /71   Pulse 52   Ht 188 cm (74\")   Wt 84.8 kg (187 lb)   BMI 24.01 kg/m²   Physical Exam   Constitutional: He appears well-developed.   HENT:   Head: Normocephalic.   Neck: Normal carotid pulses and no JVD present. No tracheal tenderness present. Carotid bruit is not present. No tracheal deviation present.   Cardiovascular: Regular rhythm and normal pulses.   Murmur heard.  Systolic murmur is present with a grade of 2/6.  Pulmonary/Chest: Effort normal. No stridor.   Abdominal: Soft. He exhibits no distension. There is no abdominal tenderness. " There is no rigidity.   Neurological: He is alert. No cranial nerve deficit or sensory deficit.   Skin: Skin is warm.   Psychiatric: His speech is normal and behavior is normal.     Results Review:    Results for orders placed during the hospital encounter of 08/25/22    Adult Transthoracic Echo Complete w/ Color, Spectral and Contrast if necessary per protocol    Interpretation Summary  · Calculated left ventricular 3D EF = 61% Left ventricular ejection fraction appears to be 56 - 60%. Left ventricular systolic function is normal.  · Left ventricular diastolic function was normal.  · Left atrial volume is severely increased.  · Estimated right ventricular systolic pressure from tricuspid regurgitation is normal (<35 mmHg).     myocardial perfusion scan  2017    Myocardial perfusion imaging indicates a normal myocardial perfusion study with no evidence of ischemia.  Left ventricular ejection fraction is normal (Calculated EF = 65%).  Diaphragmatic attenuation and GI artifacts are present.  Raw images reviewed with the following abnormalities noted: vertical motion artifact.  Impressions are consistent with a low risk study.     Holter 2017    Average HR: 74. Min HR: 55. Max HR: 120.     The predominant rhythm noted during the testing period was sinus rhythm.  Premature atrial and ventricular contractions occured rarely.  Total ectopy burden during the monitoring period; PVCs:    9    and APCs:    91      5   supraventricular tachycardia episodes  longest  11     beats at maximum rate of  127      BPM      No significant  ventricular tachy or mary arrhythmia.  No correlated arrhythmia  No significant pauses          ECG 12 Lead    Date/Time: 7/28/2023 10:25 AM  Performed by: Carlito Barton MD  Authorized by: Carlito Barton MD   Comparison: compared with previous ECG from 1/26/2023  Comparison to previous ECG: Ventricular rate changed from 62  to 52 beats per minute      Rhythm: sinus bradycardia  Rate: bradycardic  Q  waves: II, III and aVF    QRS axis: normal    Clinical impression: abnormal EKG        Assessment & Plan   Diagnoses and all orders for this visit:    1. CHAKRABORTY (dyspnea on exertion) (Primary)  -     CBC & Differential; Future  -     Comprehensive Metabolic Panel; Future  -     Thyroid Panel With TSH; Future  -     BNP; Future    2. PAF (paroxysmal atrial fibrillation)  -     CBC & Differential; Future  -     Comprehensive Metabolic Panel; Future  -     Thyroid Panel With TSH; Future  -     BNP; Future    3. PSVT (paroxysmal supraventricular tachycardia)    4. Sinus bradycardia    5. Essential hypertension    6. Body mass index (BMI) 24.0-24.9, adult    7. Former smoker    8. Hepatitis C virus infection cured after antiviral drug therapy    Other orders  -     ECG 12 Lead           Plan      Continue same medications     Check BP and heart rates twice daily initially till blood pressures and heart rates under good control and then at least 3x / week,   If blood pressures continue to be well-controlled then can check week a month  at home and bring a recording for review next visit  If BP >130/85 or < 100/60 persistently over 3 reading 30 mins apart or if heart rates persistently above 100 bpm or less than 55 bpm call sooner for evaluation and advise     Overall doing well     MDM     Amount and/or Complexity of Data Reviewed  Clinical lab tests: reviewed  Tests in the medicine section of CPT®: reviewed  Review and summarize past medical records: no  Independent visualization of images, tracings, or specimens: yes    Risk of Complications, Morbidity, and/or Mortality  Presenting problems: moderate  Diagnostic procedures: moderate  Management options: moderate       Continue on anticoagulation  Monitor for any signs of bleeding including red or dark stools as well as easy bruisabilty. Fall precautions.        Orders Placed This Encounter   Procedures    Comprehensive Metabolic Panel     Standing Status:   Future      Standing Expiration Date:   7/28/2024    Thyroid Panel With TSH     Standing Status:   Future     Standing Expiration Date:   7/28/2024     Order Specific Question:   Release to patient     Answer:   Routine Release    BNP     Standing Status:   Future     Standing Expiration Date:   7/28/2024     Order Specific Question:   Release to patient     Answer:   Routine Release    ECG 12 Lead     This order was created via procedure documentation     Order Specific Question:   Release to patient     Answer:   Routine Release    CBC & Differential     Standing Status:   Future     Standing Expiration Date:   7/28/2024     Order Specific Question:   Manual Differential     Answer:   No     Order Specific Question:   Release to patient     Answer:   Routine Release      Call for results of above testing.     I support the patient's decision to take the Covid -19 vaccine   Had required complete course   No major issues   Now fully immunized    Had booster too             Return in about 6 months (around 1/28/2024).

## 2024-02-14 ENCOUNTER — OFFICE VISIT (OUTPATIENT)
Dept: CARDIOLOGY | Facility: CLINIC | Age: 64
End: 2024-02-14
Payer: COMMERCIAL

## 2024-02-14 VITALS
SYSTOLIC BLOOD PRESSURE: 128 MMHG | WEIGHT: 181 LBS | HEART RATE: 53 BPM | BODY MASS INDEX: 23.23 KG/M2 | HEIGHT: 74 IN | DIASTOLIC BLOOD PRESSURE: 60 MMHG | OXYGEN SATURATION: 99 % | RESPIRATION RATE: 18 BRPM

## 2024-02-14 DIAGNOSIS — I48.0 PAF (PAROXYSMAL ATRIAL FIBRILLATION): Primary | ICD-10-CM

## 2024-02-14 DIAGNOSIS — I47.10 PSVT (PAROXYSMAL SUPRAVENTRICULAR TACHYCARDIA): ICD-10-CM

## 2024-02-14 DIAGNOSIS — I10 ESSENTIAL HYPERTENSION: ICD-10-CM

## 2024-02-14 DIAGNOSIS — Z79.01 CURRENT USE OF LONG TERM ANTICOAGULATION: ICD-10-CM

## 2024-05-09 ENCOUNTER — TELEPHONE (OUTPATIENT)
Dept: CARDIOLOGY | Facility: CLINIC | Age: 64
End: 2024-05-09
Payer: COMMERCIAL

## 2024-05-10 ENCOUNTER — OFFICE VISIT (OUTPATIENT)
Dept: UROLOGY | Facility: CLINIC | Age: 64
End: 2024-05-10
Payer: COMMERCIAL

## 2024-05-10 VITALS — WEIGHT: 179.2 LBS | TEMPERATURE: 97.5 F | BODY MASS INDEX: 23 KG/M2 | HEIGHT: 74 IN

## 2024-05-10 DIAGNOSIS — R35.0 URINARY FREQUENCY: ICD-10-CM

## 2024-05-10 DIAGNOSIS — N32.9 LESION OF URINARY BLADDER: Primary | ICD-10-CM

## 2024-05-10 LAB
BILIRUB BLD-MCNC: NEGATIVE MG/DL
CLARITY, POC: CLEAR
COLOR UR: YELLOW
GLUCOSE UR STRIP-MCNC: NEGATIVE MG/DL
KETONES UR QL: NEGATIVE
LEUKOCYTE EST, POC: NEGATIVE
NITRITE UR-MCNC: NEGATIVE MG/ML
PH UR: 6.5 [PH] (ref 5–8)
PROT UR STRIP-MCNC: NEGATIVE MG/DL
RBC # UR STRIP: NEGATIVE /UL
SP GR UR: 1.01 (ref 1–1.03)
UROBILINOGEN UR QL: NORMAL

## 2024-05-10 RX ORDER — TAMSULOSIN HYDROCHLORIDE 0.4 MG/1
1 CAPSULE ORAL EVERY EVENING
COMMUNITY
Start: 2024-04-01 | End: 2024-05-10 | Stop reason: SDUPTHER

## 2024-05-10 RX ORDER — TAMSULOSIN HYDROCHLORIDE 0.4 MG/1
1 CAPSULE ORAL EVERY EVENING
Qty: 90 CAPSULE | Refills: 3 | Status: SHIPPED | OUTPATIENT
Start: 2024-05-10

## 2024-11-25 ENCOUNTER — TELEPHONE (OUTPATIENT)
Dept: CARDIOLOGY | Facility: CLINIC | Age: 64
End: 2024-11-25
Payer: COMMERCIAL

## 2024-11-25 NOTE — TELEPHONE ENCOUNTER
Caller: Kinga Mendez    Relationship to patient: Emergency Contact    Best call back number: [phone]    Chief complaint: SOB AND AROUND HIS HEART IS BOTHERING HIM    Type of visit: FOLLOW UP    Requested date: ASAP

## 2024-11-25 NOTE — TELEPHONE ENCOUNTER
I CALLED PATIENT AND HE WENT AND SEEN HIS PCP TODAY.     HE CURRENTLY HAS A SINUS INFECTION AND HEAD COLD HE STATED HE HAS BEEN COUGHING AND HIS PCP DID CALL HIM IN SOME MEDICATION. HE SAID HE DOES HAVE SOME PAIN IN HIS CHEST AND HE KNOWS IF IT GET WORSE TO GO TO THE ER. APPT MADE FOR WEDNESDAY 11/27/2024

## 2024-12-16 ENCOUNTER — OFFICE VISIT (OUTPATIENT)
Dept: CARDIOLOGY | Facility: CLINIC | Age: 64
End: 2024-12-16
Payer: COMMERCIAL

## 2024-12-16 VITALS
SYSTOLIC BLOOD PRESSURE: 127 MMHG | WEIGHT: 186 LBS | BODY MASS INDEX: 23.87 KG/M2 | DIASTOLIC BLOOD PRESSURE: 78 MMHG | OXYGEN SATURATION: 99 % | HEIGHT: 74 IN | HEART RATE: 53 BPM

## 2024-12-16 DIAGNOSIS — Z86.19 HEPATITIS C VIRUS INFECTION CURED AFTER ANTIVIRAL DRUG THERAPY: ICD-10-CM

## 2024-12-16 DIAGNOSIS — R00.1 SINUS BRADYCARDIA: ICD-10-CM

## 2024-12-16 DIAGNOSIS — R06.09 DOE (DYSPNEA ON EXERTION): ICD-10-CM

## 2024-12-16 DIAGNOSIS — I47.10 PSVT (PAROXYSMAL SUPRAVENTRICULAR TACHYCARDIA): Primary | ICD-10-CM

## 2024-12-16 DIAGNOSIS — R07.9 CHEST PAIN IN ADULT: ICD-10-CM

## 2024-12-16 PROCEDURE — 3074F SYST BP LT 130 MM HG: CPT | Performed by: INTERNAL MEDICINE

## 2024-12-16 PROCEDURE — 99214 OFFICE O/P EST MOD 30 MIN: CPT | Performed by: INTERNAL MEDICINE

## 2024-12-16 PROCEDURE — 3078F DIAST BP <80 MM HG: CPT | Performed by: INTERNAL MEDICINE

## 2024-12-16 PROCEDURE — 1159F MED LIST DOCD IN RCRD: CPT | Performed by: INTERNAL MEDICINE

## 2024-12-16 PROCEDURE — 1160F RVW MEDS BY RX/DR IN RCRD: CPT | Performed by: INTERNAL MEDICINE

## 2024-12-16 RX ORDER — NITROGLYCERIN 0.4 MG/1
0.8 TABLET SUBLINGUAL
OUTPATIENT
Start: 2024-12-16

## 2024-12-16 RX ORDER — METOPROLOL TARTRATE 50 MG
50 TABLET ORAL
OUTPATIENT
Start: 2024-12-16

## 2024-12-16 RX ORDER — SODIUM CHLORIDE 0.9 % (FLUSH) 0.9 %
10 SYRINGE (ML) INJECTION AS NEEDED
OUTPATIENT
Start: 2024-12-16

## 2024-12-16 RX ORDER — SODIUM CHLORIDE 0.9 % (FLUSH) 0.9 %
10 SYRINGE (ML) INJECTION EVERY 12 HOURS SCHEDULED
OUTPATIENT
Start: 2024-12-16

## 2024-12-16 RX ORDER — METOPROLOL TARTRATE 25 MG/1
150 TABLET, FILM COATED ORAL ONCE
OUTPATIENT
Start: 2024-12-16

## 2024-12-16 RX ORDER — NITROGLYCERIN 0.4 MG/1
0.4 TABLET SUBLINGUAL
OUTPATIENT
Start: 2024-12-16 | End: 2024-12-16

## 2024-12-16 RX ORDER — METOPROLOL TARTRATE 25 MG/1
100 TABLET, FILM COATED ORAL ONCE
OUTPATIENT
Start: 2024-12-16

## 2024-12-16 RX ORDER — METOPROLOL TARTRATE 1 MG/ML
5 INJECTION, SOLUTION INTRAVENOUS
OUTPATIENT
Start: 2024-12-16

## 2024-12-16 RX ORDER — METOPROLOL TARTRATE 25 MG/1
200 TABLET, FILM COATED ORAL ONCE
OUTPATIENT
Start: 2024-12-16 | End: 2024-12-16

## 2024-12-16 RX ORDER — METOPROLOL TARTRATE 25 MG/1
50 TABLET, FILM COATED ORAL ONCE
OUTPATIENT
Start: 2024-12-16

## 2024-12-16 RX ORDER — METOPROLOL TARTRATE 25 MG/1
25 TABLET, FILM COATED ORAL ONCE
OUTPATIENT
Start: 2024-12-16

## 2024-12-16 RX ORDER — SODIUM CHLORIDE 9 MG/ML
40 INJECTION, SOLUTION INTRAVENOUS AS NEEDED
OUTPATIENT
Start: 2024-12-16

## 2024-12-16 NOTE — PROGRESS NOTES
Viktor Mendez  [id number]  1960  64 y.o.  male    Referring Provider: Niesha Ochoa APRN    Reason for Follow-up Visit:   Routine follow up.   Low risk stress test with normal LVEF    Had periop paroxysmal atrial fibrillation during abdominal surgery in [place] for diverticulitis and colostomy  sinus bradycardia  now improved after changing eye drops  Was in ER for atrial fibrillation   Started on on anticoagulation  ECG today shows  sinus rhythm rate 53 BPM   Prior DC cardioversion     Subjective     Chest pain both with exertion as well as at rest.  Dull   Says it may be gas   Feels episodes of chest pain occur no more often with exertion  Mild dull substernal,   Pressure like   Chest pain non pleuritic  Chest pain non positional and non gustatory   Lasts less than 1 minute    Started more than 3 months ago  Occurs once or twice a week on the average but can be variable in frequency  No associated diaphoresis    No associated nausea  No radiation    Relieved with rest or spontaneously  Not positional    No change with intake of food or antacids  No change with breathing  Mild to moderate associated dyspnea    No similar chest pain episodes in the past         History of present illness:  Viktor Mendez is a 64 y.o. yo male with paroxysmal atrial fibrillation who presents today for   Chief Complaint   Patient presents with    Atrial Fibrillation     PAF    Chest Pain     Occasional - no NTG needed; thinks it's gas   .    History  Past Medical History:   Diagnosis Date    Anxiety     Asthma     Atrial fibrillation     DDD (degenerative disc disease), lumbar     Essential hypertension 03/11/2021    GERD (gastroesophageal reflux disease)     Glaucoma     Hepatitis C     Kidney cysts     Low back pain     Urinary incontinence 2022   ,   Past Surgical History:   Procedure Laterality Date    COLONOSCOPY      COLONOSCOPY N/A 03/21/2022    Procedure: COLONOSCOPY WITH ANESTHESIA;  Surgeon: Teja Parker MD;   Location:  [facility];  Service: Gastroenterology;  Laterality: N/A;  pre RUQ pain; hx colon polyp  post; polyps   Kaz Galindo DO    COLOSTOMY      COLOSTOMY REVISION      CYSTOSCOPY BLADDER BIOPSY N/A 09/27/2022    Procedure: CYSTOSCOPY BLADDER BIOPSY, BLADDER HYDRODISTENSION;  Surgeon: Ike Melendez MD;  Location:  [facility];  Service: Urology;  Laterality: N/A;    CYSTOSCOPY BLADDER HYDRODISTENSION N/A 09/27/2022    Procedure: BLADDER HYDRODISTENSION;  Surgeon: Ike Melendez MD;  Location:  [facility];  Service: Urology;  Laterality: N/A;    ENDOSCOPY      ENDOSCOPY N/A 03/21/2022    Procedure: ESOPHAGOGASTRODUODENOSCOPY WITH ANESTHESIA;  Surgeon: Teja Parker MD;  Location:  [facility];  Service: Gastroenterology;  Laterality: N/A;  pre GERD  post; normal   Kaz Galindo DO    EYE SURGERY     ,   Family History   Problem Relation Age of Onset    Diabetes Mother     No Known Problems Father     Colon cancer Neg Hx     Colon polyps Neg Hx     Esophageal cancer Neg Hx    ,   Social History     Tobacco Use    Smoking status: Former     Current packs/day: 2.00     Average packs/day: 2.0 packs/day for 30.0 years (60.0 ttl pk-yrs)     Types: Cigarettes    Smokeless tobacco: Never    Tobacco comments:     QUIT 10 YEARS AGO    Vaping Use    Vaping status: Never Used   Substance Use Topics    Alcohol use: Not Currently     Comment: occ    Drug use: Yes     Types: Marijuana     Comment: occ   ,     Medications  Current Outpatient Medications   Medication Sig Dispense Refill    Advair Diskus 250-50 MCG/ACT DISKUS Inhale 1 each 2 (Two) Times a Day.      albuterol sulfate  (90 Base) MCG/ACT inhaler INHALE 2 PUFFS BY MOUTH EVERY 4 HOURS AS NEEDED FOR WHEEZING FOR SHORTNESS OF BREATH      busPIRone (BUSPAR) 15 MG tablet TAKE 1 TABLET BY MOUTH IN THE MORNING AND AT BEDTIME      cetirizine (zyrTEC) 10 MG tablet Take 1 tablet by mouth Daily.      citalopram (CeleXA) 20 MG tablet  Take 1 tablet by mouth Daily.      cyclobenzaprine (FLEXERIL) 5 MG tablet Take 1 tablet by mouth 3 (Three) Times a Day As Needed for Muscle Spasms.      docusate sodium (Colace) 100 MG capsule Take 1 capsule by mouth 2 (Two) Times a Day. 60 capsule 1    dorzolamide-timolol (COSOPT) 22.3-6.8 MG/ML ophthalmic solution Administer 1 drop to both eyes Every Morning.      hydrOXYzine (ATARAX) 10 MG tablet Take 1 tablet by mouth Every 6 (Six) Hours As Needed for Anxiety.      IBUPROFEN PO Take  by mouth As Needed.      latanoprost (XALATAN) 0.005 % ophthalmic solution Administer 1 drop to both eyes Every Night.      lisinopril (PRINIVIL,ZESTRIL) 10 MG tablet Take 0.5 tablets by mouth Daily.      metoprolol tartrate (LOPRESSOR) 25 MG tablet Take 0.5 tablets by mouth 2 (Two) Times a Day.      rivaroxaban (XARELTO) 20 MG tablet Take 1 tablet by mouth Daily. 90 tablet 3    tamsulosin (FLOMAX) 0.4 MG capsule 24 hr capsule Take 1 capsule by mouth Every Evening. 90 capsule 3     No current facility-administered medications for this visit.   Holter  Average HR: 74. Min HR: 55. Max HR: 120.     The predominant rhythm noted during the testing period was sinus rhythm.  Premature atrial and ventricular contractions occured rarely.  Total ectopy burden during the monitoring period; PVCs:    9    and APCs:    91      5   supraventricular tachycardia episodes  longest  11     beats at maximum rate of  127      BPM      No significant  ventricular tachy or mary arrhythmia.  No correlated arrhythmia  No significant pauses   myocardial perfusion scan    Low risk stress test with normal LVEF     Allergies:  Patient has no known allergies.    Review of Systems  Review of Systems   Constitutional: Positive for decreased appetite. Negative for malaise/fatigue.   HENT: Negative.     Eyes: Negative.    Cardiovascular:  Positive for dyspnea on exertion. Negative for chest pain, claudication, cyanosis, irregular heartbeat, leg swelling,  "near-syncope, orthopnea, palpitations, paroxysmal nocturnal dyspnea and syncope.   Respiratory: Negative.     Endocrine: Negative.    Hematologic/Lymphatic: Negative.    Skin: Negative.    Musculoskeletal:  Positive for arthritis and back pain.   Gastrointestinal:  Negative for anorexia.   Genitourinary: Negative.    Neurological:  Negative for weakness.   Psychiatric/Behavioral: Negative.         Objective     Physical Exam:  /78   Pulse 53   Ht 188 cm (74.02\")   Wt 84.4 kg (186 lb)   SpO2 99%   BMI 23.87 kg/m²   Physical Exam   Constitutional: He appears well-developed.   HENT:   Head: Normocephalic.   Neck: Normal carotid pulses and no JVD present. No tracheal tenderness present. Carotid bruit is not present. No tracheal deviation present.   Cardiovascular: Regular rhythm and normal pulses.   Murmur heard.  Systolic murmur is present with a grade of 2/6.  Pulmonary/Chest: Effort normal. No stridor.   Abdominal: Soft. He exhibits no distension. There is no abdominal tenderness. There is no rigidity.   Neurological: He is alert. No cranial nerve deficit or sensory deficit.   Skin: Skin is warm.   Psychiatric: His speech is normal and behavior is normal.       Results Review:    Results for orders placed during the hospital encounter of 08/25/22    Adult Transthoracic Echo Complete w/ Color, Spectral and Contrast if necessary per protocol    Interpretation Summary  · Calculated left ventricular 3D EF = 61% Left ventricular ejection fraction appears to be 56 - 60%. Left ventricular systolic function is normal.  · Left ventricular diastolic function was normal.  · Left atrial volume is severely increased.  · Estimated right ventricular systolic pressure from tricuspid regurgitation is normal (<35 mmHg).     myocardial perfusion scan  2017    Myocardial perfusion imaging indicates a normal myocardial perfusion study with no evidence of ischemia.  Left ventricular ejection fraction is normal (Calculated EF = " 65%).  Diaphragmatic attenuation and GI artifacts are present.  Raw images reviewed with the following abnormalities noted: vertical motion artifact.  Impressions are consistent with a low risk study.     Holter 2017    Average HR: 74. Min HR: 55. Max HR: 120.     The predominant rhythm noted during the testing period was sinus rhythm.  Premature atrial and ventricular contractions occured rarely.  Total ectopy burden during the monitoring period; PVCs:    9    and APCs:    91      5   supraventricular tachycardia episodes  longest  11     beats at maximum rate of  127      BPM      No significant  ventricular tachy or mary arrhythmia.  No correlated arrhythmia  No significant pauses        Procedures    Assessment & Plan   Diagnoses and all orders for this visit:    1. PSVT (paroxysmal supraventricular tachycardia) (Primary)    2. Sinus bradycardia    3. Hepatitis C virus infection cured after antiviral drug therapy    4. CHAKRABORTY (dyspnea on exertion)    5. Chest pain in adult  -     CT Angiogram Coronary; Future  -     CT Angiogram Coronary-Cardiology Interpretation; Future  -     metoprolol tartrate (LOPRESSOR) tablet 200 mg  -     metoprolol tartrate (LOPRESSOR) tablet 150 mg  -     metoprolol tartrate (LOPRESSOR) tablet 100 mg  -     metoprolol tartrate (LOPRESSOR) tablet 50 mg  -     metoprolol tartrate (LOPRESSOR) tablet 25 mg  -     metoprolol tartrate (LOPRESSOR) tablet 50 mg  -     metoprolol tartrate (LOPRESSOR) injection 5 mg  -     nitroglycerin (NITROSTAT) SL tablet 0.4 mg  -     nitroglycerin (NITROSTAT) SL tablet 0.8 mg  -     No Caffeine or Nicotine 4 Hours Prior to CTA Appointment  -     Nothing to Eat or Drink 4 Hours Prior to CTA Appointment  -     Do Not Take Phosphodiasterase Inhibitors in the 72 Hours Prior to Coronary CTA  -     Obtain Informed Consent - Computed Tomography Angiography of Chest - Angiogram of Coronary Arteries; Standing  -     Vital Signs Upon Arrival; Standing  -     Cardiac  Monitoring; Standing  -     Verify NPO Status - Patient to Be NPO at Least 4 Hours Prior to CTA; Standing  -     Notify CT After Administration of metoprolol tartrate (LOPRESSOR) tablet; Standing  -     Notify Provider If Total Metoprolol Given Equals 300mg & Heart Rate Not At Goal; Standing  -     Notify Provider Prior to Administration of Nitroglycerin if Patient SBP <80; Standing  -     Creatinine Serum (kidney function) GFR component; Standing  -     Insert Peripheral IV; Standing  -     Saline Lock & Maintain IV Access; Standing  -     sodium chloride 0.9 % flush 10 mL  -     sodium chloride 0.9 % flush 10 mL  -     sodium chloride 0.9 % infusion 40 mL  -     Vital Signs - See Instructions; Standing  -     Hold Medication Metformin (Glucophage, Glucophage XR, Fortament, Glumetza);  Metglip (metformin/glipizide);  Glucovance (metformin/glyburide); Avandamet (metformin/rosiglitazone); Standing  -     Patient May Discharge Home After Procedure Complete (If Stable); Standing           Plan    Will get CCTA and CT FFR for chest pain     Orders Placed This Encounter   Procedures    CT Angiogram Coronary     Standing Status:   Future     Standing Expiration Date:   12/16/2025     Order Specific Question:   Indication for Coronary CTA     Answer:   Chest Pain - Possibly Cardiac     Order Specific Question:   History of CABG or Coronary Stent     Answer:   No     Order Specific Question:   Release to patient     Answer:   Routine Release [[id number]]    No Caffeine or Nicotine 4 Hours Prior to CTA Appointment    Nothing to Eat or Drink 4 Hours Prior to CTA Appointment    Do Not Take Phosphodiasterase Inhibitors in the 72 Hours Prior to Coronary CTA     Examples of these medications: Sildenafil (Viagra), Tadalafil (Cialis), Avanafil (Stendra), Vardenafil (Levitra)      Continue same medications     Check BP and heart rates twice daily initially till blood pressures and heart rates under good control and then at least 3x  / week,   If blood pressures continue to be well-controlled then can check week a month  at home and bring a recording for review next visit  If BP >130/85 or < 100/60 persistently over 3 reading 30 mins apart or if heart rates persistently above 100 bpm or less than 55 bpm call sooner for evaluation and advise     I support the patient's decision to take the Covid -19 vaccine   Had required complete course   No major issues   Now fully immunized    Had booster too             Return in about 2 months (around 2/16/2025).

## 2025-01-02 ENCOUNTER — TELEPHONE (OUTPATIENT)
Dept: INTERVENTIONAL RADIOLOGY/VASCULAR | Facility: HOSPITAL | Age: 65
End: 2025-01-02
Payer: COMMERCIAL

## 2025-01-08 ENCOUNTER — HOSPITAL ENCOUNTER (OUTPATIENT)
Dept: CT IMAGING | Facility: HOSPITAL | Age: 65
Discharge: HOME | End: 2025-01-08
Payer: COMMERCIAL

## 2025-01-08 ENCOUNTER — HOSPITAL ENCOUNTER (OUTPATIENT)
Dept: INTERVENTIONAL RADIOLOGY/VASCULAR | Facility: HOSPITAL | Age: 65
Discharge: HOME | End: 2025-01-08
Payer: COMMERCIAL

## 2025-01-08 VITALS
BODY MASS INDEX: 23.99 KG/M2 | OXYGEN SATURATION: 98 % | TEMPERATURE: 98.4 F | HEIGHT: 73 IN | HEART RATE: 57 BPM | RESPIRATION RATE: 18 BRPM | SYSTOLIC BLOOD PRESSURE: 120 MMHG | WEIGHT: 181 LBS | DIASTOLIC BLOOD PRESSURE: 70 MMHG

## 2025-01-08 DIAGNOSIS — R07.9 CHEST PAIN IN ADULT: ICD-10-CM

## 2025-01-08 LAB — CREAT BLDA-MCNC: 1 MG/DL (ref 0.6–1.3)

## 2025-01-08 PROCEDURE — 75574 CT ANGIO HRT W/3D IMAGE: CPT

## 2025-01-08 PROCEDURE — 82565 ASSAY OF CREATININE: CPT

## 2025-01-08 PROCEDURE — 25510000001 IOPAMIDOL PER 1 ML: Performed by: INTERNAL MEDICINE

## 2025-01-08 RX ORDER — SODIUM CHLORIDE 0.9 % (FLUSH) 0.9 %
10 SYRINGE (ML) INJECTION AS NEEDED
Status: DISCONTINUED | OUTPATIENT
Start: 2025-01-08 | End: 2025-01-09 | Stop reason: HOSPADM

## 2025-01-08 RX ORDER — SODIUM CHLORIDE 9 MG/ML
40 INJECTION, SOLUTION INTRAVENOUS AS NEEDED
Status: DISCONTINUED | OUTPATIENT
Start: 2025-01-08 | End: 2025-01-09 | Stop reason: HOSPADM

## 2025-01-08 RX ORDER — METOPROLOL TARTRATE 1 MG/ML
5 INJECTION, SOLUTION INTRAVENOUS
Status: DISCONTINUED | OUTPATIENT
Start: 2025-01-08 | End: 2025-01-09 | Stop reason: HOSPADM

## 2025-01-08 RX ORDER — METOPROLOL TARTRATE 50 MG
50 TABLET ORAL
Status: DISCONTINUED | OUTPATIENT
Start: 2025-01-08 | End: 2025-01-09 | Stop reason: HOSPADM

## 2025-01-08 RX ORDER — NITROGLYCERIN 0.4 MG/1
0.4 TABLET SUBLINGUAL
Status: COMPLETED | OUTPATIENT
Start: 2025-01-08 | End: 2025-01-08

## 2025-01-08 RX ORDER — NITROGLYCERIN 0.4 MG/1
0.8 TABLET SUBLINGUAL
Status: COMPLETED | OUTPATIENT
Start: 2025-01-08 | End: 2025-01-08

## 2025-01-08 RX ORDER — SODIUM CHLORIDE 0.9 % (FLUSH) 0.9 %
10 SYRINGE (ML) INJECTION EVERY 12 HOURS SCHEDULED
Status: DISCONTINUED | OUTPATIENT
Start: 2025-01-08 | End: 2025-01-09 | Stop reason: HOSPADM

## 2025-01-08 RX ORDER — METOPROLOL TARTRATE 50 MG
50 TABLET ORAL ONCE
Status: DISCONTINUED | OUTPATIENT
Start: 2025-01-08 | End: 2025-01-09 | Stop reason: HOSPADM

## 2025-01-08 RX ORDER — METOPROLOL TARTRATE 100 MG/1
200 TABLET ORAL ONCE
Status: DISCONTINUED | OUTPATIENT
Start: 2025-01-08 | End: 2025-01-09 | Stop reason: HOSPADM

## 2025-01-08 RX ORDER — METOPROLOL TARTRATE 100 MG/1
100 TABLET ORAL ONCE
Status: DISCONTINUED | OUTPATIENT
Start: 2025-01-08 | End: 2025-01-09 | Stop reason: HOSPADM

## 2025-01-08 RX ORDER — IOPAMIDOL 755 MG/ML
100 INJECTION, SOLUTION INTRAVASCULAR
Status: COMPLETED | OUTPATIENT
Start: 2025-01-08 | End: 2025-01-08

## 2025-01-08 RX ORDER — METOPROLOL TARTRATE 25 MG/1
25 TABLET, FILM COATED ORAL ONCE
Status: DISCONTINUED | OUTPATIENT
Start: 2025-01-08 | End: 2025-01-09 | Stop reason: HOSPADM

## 2025-01-08 RX ADMIN — IOPAMIDOL 100 ML: 755 INJECTION, SOLUTION INTRAVENOUS at 10:29

## 2025-01-08 RX ADMIN — NITROGLYCERIN 0.8 MG: 0.4 TABLET SUBLINGUAL at 10:13

## 2025-01-10 ENCOUNTER — HOSPITAL ENCOUNTER (OUTPATIENT)
Dept: CT IMAGING | Facility: HOSPITAL | Age: 65
Discharge: HOME | End: 2025-01-10
Admitting: INTERNAL MEDICINE
Payer: COMMERCIAL

## 2025-01-10 DIAGNOSIS — R93.1 ABNORMAL FINDINGS DIAGNOSTIC IMAGING OF HEART AND CORONARY CIRCULATION: ICD-10-CM

## 2025-01-10 DIAGNOSIS — R93.1 ABNORMAL FINDINGS DIAGNOSTIC IMAGING OF HEART AND CORONARY CIRCULATION: Primary | ICD-10-CM

## 2025-01-10 PROCEDURE — 75580 N-INVAS EST C FFR SW ALY CTA: CPT

## 2025-02-27 ENCOUNTER — TELEPHONE (OUTPATIENT)
Dept: CARDIOLOGY | Facility: CLINIC | Age: 65
End: 2025-02-27

## 2025-02-27 NOTE — TELEPHONE ENCOUNTER
[facility] staff attempted to follow warm transfer process and was unsuccessful     Caller: Kinga Mendez    Relationship to patient: Emergency Contact    Best call back number: [phone]    Patient is needing: PATIENT IS EXPERIENCING SOB GOTTEN WORSE IN LAST 2 WEEKS AND CHEST PAIN THAT COMES AND GOES.  WONDERING IF HE IS IN AFIB?

## 2025-03-24 RX ORDER — RIVAROXABAN 20 MG/1
20 TABLET, FILM COATED ORAL DAILY
Refills: 0 | OUTPATIENT
Start: 2025-03-24

## 2025-03-25 DIAGNOSIS — J44.9 CHRONIC OBSTRUCTIVE PULMONARY DISEASE, UNSPECIFIED COPD TYPE (HCC): Primary | ICD-10-CM

## 2025-03-25 RX ORDER — METHOCARBAMOL 750 MG/1
750 TABLET, FILM COATED ORAL 4 TIMES DAILY
COMMUNITY

## 2025-03-25 RX ORDER — ALPRAZOLAM 0.5 MG
0.5 TABLET ORAL NIGHTLY PRN
COMMUNITY

## 2025-03-25 RX ORDER — LISINOPRIL 10 MG/1
10 TABLET ORAL DAILY
COMMUNITY

## 2025-03-26 ENCOUNTER — OFFICE VISIT (OUTPATIENT)
Dept: CARDIOLOGY CLINIC | Age: 65
End: 2025-03-26
Payer: MEDICARE

## 2025-03-26 VITALS
HEART RATE: 57 BPM | BODY MASS INDEX: 24 KG/M2 | WEIGHT: 187 LBS | OXYGEN SATURATION: 96 % | SYSTOLIC BLOOD PRESSURE: 122 MMHG | HEIGHT: 74 IN | DIASTOLIC BLOOD PRESSURE: 80 MMHG

## 2025-03-26 DIAGNOSIS — I48.0 PAF (PAROXYSMAL ATRIAL FIBRILLATION) (HCC): ICD-10-CM

## 2025-03-26 DIAGNOSIS — I25.10 MILD CAD: ICD-10-CM

## 2025-03-26 DIAGNOSIS — I15.9 SECONDARY HYPERTENSION: Primary | ICD-10-CM

## 2025-03-26 PROCEDURE — 93000 ELECTROCARDIOGRAM COMPLETE: CPT | Performed by: CLINICAL NURSE SPECIALIST

## 2025-03-26 PROCEDURE — 1123F ACP DISCUSS/DSCN MKR DOCD: CPT | Performed by: CLINICAL NURSE SPECIALIST

## 2025-03-26 PROCEDURE — 99203 OFFICE O/P NEW LOW 30 MIN: CPT | Performed by: CLINICAL NURSE SPECIALIST

## 2025-03-26 RX ORDER — FLUTICASONE PROPIONATE 50 MCG
1 SPRAY, SUSPENSION (ML) NASAL DAILY PRN
COMMUNITY
Start: 2025-01-14

## 2025-03-26 RX ORDER — BRIMONIDINE TARTRATE 2 MG/ML
1 SOLUTION/ DROPS OPHTHALMIC 3 TIMES DAILY
COMMUNITY

## 2025-03-26 RX ORDER — HYDROXYZINE HYDROCHLORIDE 25 MG/1
25 TABLET, FILM COATED ORAL 3 TIMES DAILY PRN
COMMUNITY

## 2025-03-26 RX ORDER — CITALOPRAM HYDROBROMIDE 20 MG/1
20 TABLET ORAL DAILY
COMMUNITY

## 2025-03-26 RX ORDER — METOPROLOL TARTRATE 25 MG/1
12.5 TABLET, FILM COATED ORAL 2 TIMES DAILY
Qty: 90 TABLET | Refills: 3 | Status: SHIPPED | OUTPATIENT
Start: 2025-03-26

## 2025-03-26 RX ORDER — TAMSULOSIN HYDROCHLORIDE 0.4 MG/1
0.4 CAPSULE ORAL DAILY
COMMUNITY

## 2025-03-26 RX ORDER — METOPROLOL TARTRATE 25 MG/1
12.5 TABLET, FILM COATED ORAL 2 TIMES DAILY
COMMUNITY
End: 2025-03-26 | Stop reason: SDUPTHER

## 2025-03-26 RX ORDER — FLUTICASONE PROPIONATE AND SALMETEROL 100; 50 UG/1; UG/1
1 POWDER RESPIRATORY (INHALATION) EVERY 12 HOURS
COMMUNITY

## 2025-03-26 RX ORDER — HYDROCODONE BITARTRATE AND ACETAMINOPHEN 5; 325 MG/1; MG/1
1 TABLET ORAL EVERY 6 HOURS PRN
COMMUNITY

## 2025-03-26 RX ORDER — LEVOCETIRIZINE DIHYDROCHLORIDE 5 MG/1
5 TABLET, FILM COATED ORAL DAILY
COMMUNITY
Start: 2025-03-07

## 2025-03-26 RX ORDER — CYCLOBENZAPRINE HCL 10 MG
10 TABLET ORAL 3 TIMES DAILY PRN
COMMUNITY

## 2025-03-26 NOTE — PATIENT INSTRUCTIONS
Continue the Xarelto-  watch for bleeding    Watch for increase in heart racing or chest pain       Maintain good blood pressure control-goal<130/80 at rest  Maintain good cholesterol control LDL goal<70 with arterial disease  If you are diabetic work to keep/obtain hemoglobin A1c< 7    Follow up in 6 mos  With Dr. Solano - establishcare   Call with any questions or concerns  Follow up with PCP for non cardiac problems  Report any new problems  Cardiovascular Fitness-Exercise as tolerated.  Strive for 30 minutes of exercise most days of the week.    Cardiac / Healthy Diet  Continue current medications as directed  Continue plan of treatment

## 2025-04-03 ENCOUNTER — TELEPHONE (OUTPATIENT)
Dept: CARDIOLOGY CLINIC | Age: 65
End: 2025-04-03

## 2025-04-03 NOTE — TELEPHONE ENCOUNTER
Per Mercedes's note: EKG today shows sinus bradycardia with rate of 56. No ST abnormalities      Returned call to patient and provided results and he voiced understanding.

## 2025-05-06 ENCOUNTER — TELEPHONE (OUTPATIENT)
Dept: UROLOGY | Facility: CLINIC | Age: 65
End: 2025-05-06
Payer: MEDICARE

## 2025-05-06 NOTE — TELEPHONE ENCOUNTER
Called Patient to remind them to get PSA prior to appointment.  LVM with Patient.  If patient calls back it is ok for the HUB to tell the pt the message.

## 2025-05-09 ENCOUNTER — TELEPHONE (OUTPATIENT)
Dept: UROLOGY | Facility: CLINIC | Age: 65
End: 2025-05-09
Payer: MEDICARE

## 2025-05-09 NOTE — TELEPHONE ENCOUNTER
Patient did not have his PSA drawn.. I faxed the order over to his PCP. And let him know we moved his appointment out a week. Patient verbalized understanding.

## 2025-05-14 NOTE — PROGRESS NOTES
Subjective    Mr. Mendez is 65 y.o. male    Chief Complaint: annual follow up    History of Present Illness    65-year-old male established patient in for follow-up regarding bothersome LUTS. Remains on tamsulosin 0.4mg daily. Nocturia x1. No other complaints at present. Denies creased urinary stream or difficulty emptying.  Patient underwent hydrodistention by Dr. Melendez 9/2022 after patient was found to have small erythematous bladder lesion along the posterior bladder wall during cystoscopy evaluation patient later went for biopsy and hydrodistention.  Tissue pathology was negative for malignancy.      PSA 5/2025-0.51    The following portions of the patient's history were reviewed and updated as appropriate: allergies, current medications, past family history, past medical history, past social history, past surgical history and problem list.    Review of Systems   Constitutional:  Negative for chills and fever.   Gastrointestinal:  Negative for abdominal pain, anal bleeding and blood in stool.   Genitourinary:  Negative for dysuria and hematuria.         Current Outpatient Medications:     Advair Diskus 250-50 MCG/ACT DISKUS, Inhale 1 each 2 (Two) Times a Day., Disp: , Rfl:     albuterol sulfate  (90 Base) MCG/ACT inhaler, INHALE 2 PUFFS BY MOUTH EVERY 4 HOURS AS NEEDED FOR WHEEZING FOR SHORTNESS OF BREATH, Disp: , Rfl:     ALPRAZolam (XANAX) 0.5 MG tablet, TAKE 1/2 (ONE-HALF) TABLET BY MOUTH IN THE MORNING AND 1 AT BEDTIME, Disp: , Rfl:     busPIRone (BUSPAR) 15 MG tablet, TAKE 1 TABLET BY MOUTH IN THE MORNING AND AT BEDTIME, Disp: , Rfl:     cetirizine (zyrTEC) 10 MG tablet, Take 1 tablet by mouth Daily., Disp: , Rfl:     citalopram (CeleXA) 20 MG tablet, Take 1 tablet by mouth Daily., Disp: , Rfl:     cyclobenzaprine (FLEXERIL) 5 MG tablet, Take 1 tablet by mouth 3 (Three) Times a Day As Needed for Muscle Spasms., Disp: , Rfl:     docusate sodium (Colace) 100 MG capsule, Take 1 capsule by mouth 2 (Two)  Times a Day., Disp: 60 capsule, Rfl: 1    dorzolamide (TRUSOPT) 2 % ophthalmic solution, INSTILL 1 DROPS INTO EACH EYE TWICE DAILY, Disp: , Rfl:     dorzolamide-timolol (COSOPT) 22.3-6.8 MG/ML ophthalmic solution, Administer 1 drop to both eyes Every Morning., Disp: , Rfl:     fluticasone (FLONASE) 50 MCG/ACT nasal spray, USE 1 SPRAY(S) IN EACH NOSTRIL ONCE DAILY AT BEDTIME, Disp: , Rfl:     gabapentin (NEURONTIN) 300 MG capsule, Take 1 capsule by mouth 3 (Three) Times a Day., Disp: , Rfl:     hydrOXYzine (ATARAX) 10 MG tablet, Take 1 tablet by mouth Every 6 (Six) Hours As Needed for Anxiety., Disp: , Rfl:     IBUPROFEN PO, Take  by mouth As Needed., Disp: , Rfl:     latanoprost (XALATAN) 0.005 % ophthalmic solution, Administer 1 drop to both eyes Every Night., Disp: , Rfl:     levocetirizine (XYZAL) 5 MG tablet, Take 1 tablet by mouth Daily., Disp: , Rfl:     lisinopril (PRINIVIL,ZESTRIL) 10 MG tablet, Take 0.5 tablets by mouth Daily., Disp: , Rfl:     LORazepam (ATIVAN) 1 MG tablet, Take 0.5 tablets by mouth., Disp: , Rfl:     methocarbamol (ROBAXIN) 750 MG tablet, TAKE 1 TABLET BY MOUTH THREE TIMES DAILY AS NEEDED FOR 10 DAYS FOR MUSCLE PAIN, Disp: , Rfl:     metoprolol tartrate (LOPRESSOR) 25 MG tablet, Take 0.5 tablets by mouth 2 (Two) Times a Day., Disp: , Rfl:     rivaroxaban (XARELTO) 20 MG tablet, Take 1 tablet by mouth Daily., Disp: 90 tablet, Rfl: 3    tamsulosin (FLOMAX) 0.4 MG capsule 24 hr capsule, Take 1 capsule by mouth Every Evening., Disp: 90 capsule, Rfl: 3    Voltaren 1 % gel gel, APPLY 2 GRAMS TO THE AFFECTED AREA(S) BY TOPICAL ROUTE 4 TIMES PER DAY, Disp: , Rfl:     Past Medical History:   Diagnosis Date    Anxiety     Asthma     Atrial fibrillation     DDD (degenerative disc disease), lumbar     Essential hypertension 03/11/2021    GERD (gastroesophageal reflux disease)     Glaucoma     Hepatitis C     Kidney cysts     Low back pain     Urinary incontinence 2022       Past Surgical History:  "  Procedure Laterality Date    COLONOSCOPY      COLONOSCOPY N/A 03/21/2022    Procedure: COLONOSCOPY WITH ANESTHESIA;  Surgeon: Teja Parker MD;  Location:  [facility];  Service: Gastroenterology;  Laterality: N/A;  pre RUQ pain; hx colon polyp  post; polyps   Kaz Galindo DO    COLOSTOMY      COLOSTOMY REVISION      CYSTOSCOPY BLADDER BIOPSY N/A 09/27/2022    Procedure: CYSTOSCOPY BLADDER BIOPSY, BLADDER HYDRODISTENSION;  Surgeon: Ike Melendez MD;  Location:  [facility];  Service: Urology;  Laterality: N/A;    CYSTOSCOPY BLADDER HYDRODISTENSION N/A 09/27/2022    Procedure: BLADDER HYDRODISTENSION;  Surgeon: Ike Melendez MD;  Location:  [facility];  Service: Urology;  Laterality: N/A;    ENDOSCOPY      ENDOSCOPY N/A 03/21/2022    Procedure: ESOPHAGOGASTRODUODENOSCOPY WITH ANESTHESIA;  Surgeon: Teja Parker MD;  Location:  [facility];  Service: Gastroenterology;  Laterality: N/A;  pre GERD  post; normal   Kaz Galindo DO    EYE SURGERY         Social History     Socioeconomic History    Marital status:    Tobacco Use    Smoking status: Former     Current packs/day: 2.00     Average packs/day: 2.0 packs/day for 30.0 years (60.0 ttl pk-yrs)     Types: Cigarettes    Smokeless tobacco: Never    Tobacco comments:     QUIT 10 YEARS AGO    Vaping Use    Vaping status: Never Used   Substance and Sexual Activity    Alcohol use: Not Currently     Comment: occ    Drug use: Yes     Types: Marijuana     Comment: occ    Sexual activity: Yes     Partners: Female       Family History   Problem Relation Age of Onset    Diabetes Mother     No Known Problems Father     Colon cancer Neg Hx     Colon polyps Neg Hx     Esophageal cancer Neg Hx        Objective    Temp 97.8 °F (36.6 °C)   Ht 185.4 cm (72.99\")   Wt 82.6 kg (182 lb)   BMI 24.02 kg/m²     Physical Exam  Constitutional:       Appearance: Normal appearance.   Abdominal:      Tenderness: There is no right CVA " tenderness or left CVA tenderness.   Skin:     General: Skin is warm and dry.   Neurological:      Mental Status: He is alert and oriented to person, place, and time.   Psychiatric:         Mood and Affect: Mood normal.         Behavior: Behavior normal.             Results for orders placed or performed in visit on 05/12/25   POC Urinalysis Dipstick, Multipro    Collection Time: 05/19/25  9:42 AM    Specimen: Urine   Result Value Ref Range    Color Yellow Yellow, Straw, Dark Yellow, Pretty    Clarity, UA Clear Clear    Glucose, UA Negative Negative mg/dL    Bilirubin Small (1+) (A) Negative    Ketones, UA Trace (A) Negative    Specific Gravity  1.015 1.005 - 1.030    Blood, UA Negative Negative    pH, Urine 6.0 5.0 - 8.0    Protein, POC Trace (A) Negative mg/dL    Urobilinogen, UA 2.0 E.U./dL (A) Normal, 0.2 E.U./dL    Nitrite, UA Negative Negative    Leukocytes Negative Negative     Assessment and Plan    Diagnoses and all orders for this visit:    1. Lesion of urinary bladder (Primary)    2. Urinary frequency  -     tamsulosin (FLOMAX) 0.4 MG capsule 24 hr capsule; Take 1 capsule by mouth Every Evening.  Dispense: 90 capsule; Refill: 3  -     PSA DIAGNOSTIC; Future        No complaints. Denies bothersome LUTS       Remains on tamsulosin 0.4 mg daily.     Continue tamsulosin 0.4 mg daily follow-up 1 year    PSA 0.51-WNL    Follow up 1 year PSA prior

## 2025-05-15 DIAGNOSIS — R35.0 URINARY FREQUENCY: ICD-10-CM

## 2025-05-19 ENCOUNTER — OFFICE VISIT (OUTPATIENT)
Dept: UROLOGY | Facility: CLINIC | Age: 65
End: 2025-05-19
Payer: MEDICARE

## 2025-05-19 VITALS — HEIGHT: 73 IN | WEIGHT: 182 LBS | TEMPERATURE: 97.8 F | BODY MASS INDEX: 24.12 KG/M2

## 2025-05-19 DIAGNOSIS — R35.0 URINARY FREQUENCY: ICD-10-CM

## 2025-05-19 DIAGNOSIS — N32.9 LESION OF URINARY BLADDER: Primary | ICD-10-CM

## 2025-05-19 RX ORDER — FLUTICASONE PROPIONATE 50 MCG
SPRAY, SUSPENSION (ML) NASAL
COMMUNITY
Start: 2025-01-14

## 2025-05-19 RX ORDER — ALPRAZOLAM 0.5 MG
TABLET ORAL
COMMUNITY
Start: 2025-05-09

## 2025-05-19 RX ORDER — TAMSULOSIN HYDROCHLORIDE 0.4 MG/1
1 CAPSULE ORAL EVERY EVENING
Qty: 90 CAPSULE | Refills: 3 | Status: SHIPPED | OUTPATIENT
Start: 2025-05-19

## 2025-05-19 RX ORDER — LEVOCETIRIZINE DIHYDROCHLORIDE 5 MG/1
5 TABLET, FILM COATED ORAL DAILY
COMMUNITY
Start: 2025-03-07

## 2025-05-19 RX ORDER — LORAZEPAM 1 MG/1
0.5 TABLET ORAL
COMMUNITY

## 2025-05-19 RX ORDER — DICLOFENAC SODIUM 10 MG/G
GEL TOPICAL
COMMUNITY
Start: 2025-03-11

## 2025-05-19 RX ORDER — METHOCARBAMOL 750 MG/1
TABLET, FILM COATED ORAL
COMMUNITY

## 2025-05-19 RX ORDER — DORZOLAMIDE HCL 20 MG/ML
SOLUTION/ DROPS OPHTHALMIC
COMMUNITY

## 2025-05-19 RX ORDER — GABAPENTIN 300 MG/1
300 CAPSULE ORAL 3 TIMES DAILY
COMMUNITY

## 2025-06-13 NOTE — PROGRESS NOTES
Subjective:     Encounter Date: 06/16/2025      Patient ID: Viktor Mendez is a 65 y.o. male with paroxysmal atrial fibrillation s/p cardioversion 8/15/2022, chronic anticoagulation, hypertension, colostomy, bradycardia, paroxysmal supraventricular tachycardia and hepatitis C    Chief Complaint:  no complaints  History of Present Illness  Patient presents today for management of paroxysmal atrial fibrillation. Patient was seen in our office 12/2024 and then this spring followed with [facility] cardiology due to issues with insurance coverage.  Today patient reports that he has been doing well. He denies any chest pain. He reports that he has been having dyspnea on exertion that has been unchanged. He denies any heart racing or palpitations. He denies any leg swelling, orthopnea or PND. He reports that he has some occasional dizziness. BP has been running normal to a little low; he reports 120/60-70's. He has been having some memory loss issues and is scheduled for a CT head/brain in July 2025. Patient denies any Xarelto and denies any bleeding. He follows with Dr Galindo as PCP    The following portions of the patient's history were reviewed and updated as appropriate: allergies, current medications, past family history, past medical history, past social history, past surgical history and problem list.    No Known Allergies    Current Outpatient Medications:     Advair Diskus 250-50 MCG/ACT DISKUS, Inhale 1 each 2 (Two) Times a Day., Disp: , Rfl:     albuterol sulfate  (90 Base) MCG/ACT inhaler, INHALE 2 PUFFS BY MOUTH EVERY 4 HOURS AS NEEDED FOR WHEEZING FOR SHORTNESS OF BREATH, Disp: , Rfl:     ALPRAZolam (XANAX) 0.5 MG tablet, TAKE 1/2 (ONE-HALF) TABLET BY MOUTH IN THE MORNING AND 1 AT BEDTIME, Disp: , Rfl:     cetirizine (zyrTEC) 10 MG tablet, Take 1 tablet by mouth Daily., Disp: , Rfl:     citalopram (CeleXA) 20 MG tablet, Take 1 tablet by mouth Daily., Disp: , Rfl:     cyclobenzaprine (FLEXERIL) 5 MG  tablet, Take 1 tablet by mouth 3 (Three) Times a Day As Needed for Muscle Spasms., Disp: , Rfl:     dorzolamide (TRUSOPT) 2 % ophthalmic solution, INSTILL 1 DROPS INTO EACH EYE TWICE DAILY, Disp: , Rfl:     dorzolamide-timolol (COSOPT) 22.3-6.8 MG/ML ophthalmic solution, Administer 1 drop to both eyes Every Morning., Disp: , Rfl:     fluticasone (FLONASE) 50 MCG/ACT nasal spray, USE 1 SPRAY(S) IN EACH NOSTRIL ONCE DAILY AT BEDTIME, Disp: , Rfl:     gabapentin (NEURONTIN) 300 MG capsule, Take 1 capsule by mouth 3 (Three) Times a Day., Disp: , Rfl:     hydrOXYzine (ATARAX) 10 MG tablet, Take 1 tablet by mouth Every 6 (Six) Hours As Needed for Anxiety., Disp: , Rfl:     IBUPROFEN PO, Take  by mouth As Needed., Disp: , Rfl:     latanoprost (XALATAN) 0.005 % ophthalmic solution, Administer 1 drop to both eyes Every Night., Disp: , Rfl:     lisinopril (PRINIVIL,ZESTRIL) 10 MG tablet, Take 0.5 tablets by mouth Daily., Disp: , Rfl:     metoprolol tartrate (LOPRESSOR) 25 MG tablet, Take 0.5 tablets by mouth 2 (Two) Times a Day., Disp: , Rfl:     rivaroxaban (XARELTO) 20 MG tablet, Take 1 tablet by mouth Daily., Disp: 90 tablet, Rfl: 3    tamsulosin (FLOMAX) 0.4 MG capsule 24 hr capsule, Take 1 capsule by mouth Every Evening., Disp: 90 capsule, Rfl: 3    Voltaren 1 % gel gel, APPLY 2 GRAMS TO THE AFFECTED AREA(S) BY TOPICAL ROUTE 4 TIMES PER DAY, Disp: , Rfl:       Past Medical History:   Diagnosis Date    Anxiety     Asthma     Atrial fibrillation     DDD (degenerative disc disease), lumbar     Essential hypertension 03/11/2021    GERD (gastroesophageal reflux disease)     Glaucoma     Hepatitis C     Kidney cysts     Low back pain     Urinary incontinence 2022     Social History     Socioeconomic History    Marital status:    Tobacco Use    Smoking status: Former     Current packs/day: 1.00     Average packs/day: 2.0 packs/day for 30.3 years (60.3 ttl pk-yrs)     Types: Cigarettes     Start date: 03/2025     Smokeless tobacco: Never    Tobacco comments:     QUIT 10 YEARS AGO    Vaping Use    Vaping status: Never Used   Substance and Sexual Activity    Alcohol use: Not Currently     Comment: RARELY    Drug use: Yes     Types: Marijuana     Comment: occ    Sexual activity: Yes     Partners: Female       Review of Systems   Constitutional: Negative for malaise/fatigue.   HENT:  Negative for nosebleeds.    Cardiovascular:  Positive for dyspnea on exertion (unchanged). Negative for chest pain, irregular heartbeat, leg swelling, near-syncope, orthopnea, palpitations, paroxysmal nocturnal dyspnea and syncope.   Respiratory:  Negative for cough and shortness of breath.    Hematologic/Lymphatic: Bruises/bleeds easily.   Genitourinary:  Negative for hematuria.   Neurological:  Negative for dizziness and weakness.   All other systems reviewed and are negative.         Objective:     Vitals reviewed.   Constitutional:       General: Not in acute distress.     Appearance: Normal appearance. Well-developed.   Eyes:      Pupils: Pupils are equal, round, and reactive to light.   HENT:      Head: Normocephalic and atraumatic.      Nose: Nose normal.   Neck:      Vascular: No carotid bruit.   Pulmonary:      Effort: Pulmonary effort is normal. No respiratory distress.      Breath sounds: Normal breath sounds. No wheezing. No rales.   Cardiovascular:      Normal rate. Irregularly irregular rhythm.      Murmurs: There is a grade 2/6 systolic murmur.   Edema:     Peripheral edema absent.   Abdominal:      General: There is no distension.      Palpations: Abdomen is soft.   Musculoskeletal: Normal range of motion.      Cervical back: Normal range of motion and neck supple. Skin:     General: Skin is warm.      Findings: No erythema or rash.   Neurological:      General: No focal deficit present.      Mental Status: Alert and oriented to person, place, and time.   Psychiatric:         Attention and Perception: Attention normal.         Mood  "and Affect: Mood normal.         Speech: Speech normal.         Behavior: Behavior normal.         Thought Content: Thought content normal.         Judgment: Judgment normal.         BP 99/78   Pulse 76   Ht 185.4 cm (72.99\")   Wt 83 kg (183 lb)   BMI 24.15 kg/m²       ECG 12 Lead    Date/Time: 6/16/2025 9:22 AM  Performed by: Jose Sarabia APRN    Authorized by: Jose Sarabia APRN  Comparison: compared with previous ECG from 12/16/2024  Comparison to previous ECG: Atrial fibrillation replaces sinus bradycardia   Rhythm: atrial fibrillation  Rate: normal  BPM: 76    Clinical impression: abnormal EKG          Lab Review:     Holter monitor 7/18/2022:  Interpretation Summary     Average HR: 109. Min HR: 50. Max HR: 233.     Entire report was reviewed.  Monitoring in days as noted below  In atrial fibrillation during entire monitoring duration  Rare premature ventricular contractions with a PVC burden of: < 1%     No correlated arrhythmia  No significant pauses                  Conclusion:    In atrial fibrillation during entire monitoring duration  Intermittent increase in ventricular rate  Rates between  bpm average 109 bpm  Slowest rate of 50 bpm while in atrial fibrillation at 5:09 AM presumably during sleep  Single 5 beat run of nonsustained ventricular tachycardia at a maximum rate of 156 bpm and average rate of 136 bpm     Cardioversion 8/15/2022:   Interpretation Summary   Post cardioversion the patient displayed a sinus rhythm.  The cardioversion was successful.      Results for orders placed during the hospital encounter of 08/25/22    Adult Transthoracic Echo Complete w/ Color, Spectral and Contrast if necessary per protocol    Interpretation Summary  · Calculated left ventricular 3D EF = 61% Left ventricular ejection fraction appears to be 56 - 60%. Left ventricular systolic function is normal.  · Left ventricular diastolic function was normal.  · Left atrial volume is severely increased.  · " Estimated right ventricular systolic pressure from tricuspid regurgitation is normal (<35 mmHg).    CT FFR report     Please refer to coronary CT angiography that is already reported and available for coronary anatomy  FFR CT analysis was performed on the original cardiac CT angiogram data set.  Diagrammatic representation of the FFR CT analysis is provided in a separate PDF document in the PACS.  This dictation was created using the PDF document and an interactive 3D model of the results.  3D model is not available in the EMR PACS.  Normal FFR range is more than 0.8     Indication for test:  Signs and symptoms consistent with coronary artery disease including shortness of breath and chest discomfort.  Increased pretest probability for coronary artery disease  Positive CTA impression with potentially flow-limiting coronary artery disease based on CTA report     Detailed findings:             Detailed findings with coronary flow modelling as referenced above         Left main: Normal  Left anterior descending coronary artery: Normal  Diagonal branch: Normal  Left circumflex coronary artery: Normal  Right coronary artery: Normal  Right posterior descending coronary artery: Normal  Right posterior lateral coronary artery: Normal           Impression:      Normal flow by CT FFR with no evidence of any hemodynamically significant stenosis of epicardial coronary arteries in referenced vessels evaluated as above      I have personally reviewed CT FFR, holter monitor, past office notes, echo and cardioversion report prior to patients visit  Assessment:          Diagnosis Plan   1. Atrial fibrillation, persistent  Holter Monitor - 72 Hour Up To 15 Days    ECG 12 Lead    Ambulatory Referral to Cardiac Electrophysiology      2. Current use of long term anticoagulation        3. PSVT (paroxysmal supraventricular tachycardia)        4. Essential hypertension        5. Atherosclerosis of native coronary artery of native heart  without angina pectoris                   Plan:       1. PAF: paroxysmal atrial fibrillation: s/p Cardioversion 8/19/2022.  EKG today shows atrial fibrillation rates 76. Continue metoprolol. Discussed rates control vs rhythm control patient is unsure at the moment how he would like to proceed. Will evaluate further with a holter monitor. Will refer to EP.      2. Chronic anticoagulation: [id number] score 2. Patient is on Xarelto and denies any bleeding issues.     3. PSVT: asymptomatic    4. Hypertension: well controlled. Continue current medications    5. Nonobstructive coronary artery disease: CT FFR 1/2025 showed normal flow with no evidence of any hemodynamically significant stenosis or coronary arteries.     I attest that all portions of this note reviewed and all information has been updated by myself to reflect the patient's current status.      I spent 35 minutes caring for Viktor on this date of service. This time includes time spent by me in the following activities:preparing for the visit, reviewing tests, obtaining and/or reviewing a separately obtained history, performing a medically appropriate examination and/or evaluation , counseling and educating the patient/family/caregiver, ordering medications, tests, or procedures, and documenting information in the medical record    I spent 2 minutes on the separately reported service of EKG. This time is not included in the time used to support the E/M service also reported today.    Patient is to follow up in 6 weeks or sooner if needed

## 2025-06-16 ENCOUNTER — OFFICE VISIT (OUTPATIENT)
Dept: CARDIOLOGY | Facility: CLINIC | Age: 65
End: 2025-06-16
Payer: MEDICARE

## 2025-06-16 VITALS
WEIGHT: 183 LBS | BODY MASS INDEX: 24.25 KG/M2 | HEART RATE: 76 BPM | DIASTOLIC BLOOD PRESSURE: 78 MMHG | HEIGHT: 73 IN | SYSTOLIC BLOOD PRESSURE: 99 MMHG

## 2025-06-16 DIAGNOSIS — I25.10 ATHEROSCLEROSIS OF NATIVE CORONARY ARTERY OF NATIVE HEART WITHOUT ANGINA PECTORIS: ICD-10-CM

## 2025-06-16 DIAGNOSIS — I47.10 PSVT (PAROXYSMAL SUPRAVENTRICULAR TACHYCARDIA): ICD-10-CM

## 2025-06-16 DIAGNOSIS — I10 ESSENTIAL HYPERTENSION: ICD-10-CM

## 2025-06-16 DIAGNOSIS — Z79.01 CURRENT USE OF LONG TERM ANTICOAGULATION: ICD-10-CM

## 2025-06-16 DIAGNOSIS — I48.19 ATRIAL FIBRILLATION, PERSISTENT: Primary | ICD-10-CM

## 2025-06-16 PROCEDURE — 1159F MED LIST DOCD IN RCRD: CPT | Performed by: NURSE PRACTITIONER

## 2025-06-16 PROCEDURE — 3074F SYST BP LT 130 MM HG: CPT | Performed by: NURSE PRACTITIONER

## 2025-06-16 PROCEDURE — 99214 OFFICE O/P EST MOD 30 MIN: CPT | Performed by: NURSE PRACTITIONER

## 2025-06-16 PROCEDURE — 93000 ELECTROCARDIOGRAM COMPLETE: CPT | Performed by: NURSE PRACTITIONER

## 2025-06-16 PROCEDURE — 3078F DIAST BP <80 MM HG: CPT | Performed by: NURSE PRACTITIONER

## 2025-06-16 PROCEDURE — 1160F RVW MEDS BY RX/DR IN RCRD: CPT | Performed by: NURSE PRACTITIONER

## 2025-07-07 ENCOUNTER — TELEPHONE (OUTPATIENT)
Dept: CARDIOLOGY | Facility: CLINIC | Age: 65
End: 2025-07-07
Payer: MEDICARE

## 2025-07-07 NOTE — TELEPHONE ENCOUNTER
Caller: Viktor Mendez    Relationship: Self    Best call back number: [phone]     Caller requesting test results: PATIENT     What test was performed: HOLTER MONITOR     When was the test performed: 7.2.25    Where was the test performed: [facility]    Additional notes:

## 2025-07-09 RX ORDER — METOPROLOL TARTRATE 25 MG/1
25 TABLET, FILM COATED ORAL 2 TIMES DAILY
Qty: 60 TABLET | Refills: 11 | Status: SHIPPED | OUTPATIENT
Start: 2025-07-09

## 2025-07-11 ENCOUNTER — TELEPHONE (OUTPATIENT)
Dept: CARDIOLOGY | Facility: CLINIC | Age: 65
End: 2025-07-11
Payer: MEDICARE

## 2025-07-11 RX ORDER — METOPROLOL TARTRATE 25 MG/1
25 TABLET, FILM COATED ORAL 2 TIMES DAILY
Qty: 60 TABLET | Refills: 11 | Status: CANCELLED | OUTPATIENT
Start: 2025-07-11

## 2025-07-11 NOTE — TELEPHONE ENCOUNTER
"Relay   LVM  \"Spoke with patient this morning. He picked up his medication June. He can take 2 of his 12.5 mg tablets.\"                "

## 2025-07-11 NOTE — TELEPHONE ENCOUNTER
Caller: Viktor Mendez    Relationship: Self    Best call back number: [phone]     Requested Prescriptions:   Requested Prescriptions     Pending Prescriptions Disp Refills    metoprolol tartrate (LOPRESSOR) 25 MG tablet 60 tablet 11     Sig: Take 1 tablet by mouth 2 (Two) Times a Day.        Pharmacy where request should be sent: [facility] [address] [place] [address] [phone] [facility] [phone]     Last office visit with prescribing clinician: 6/16/2025   Last telemedicine visit with prescribing clinician: Visit date not found   Next office visit with prescribing clinician: 8/22/2025     Additional details provided by patient: PATIENT SPOKE TO THE PHARMACY YESTERDAY EVENING AND THEY STATED NO SCRIPT WAS SENT IN. PLEASE RESEND THE SCRIPT TO [facility] IN [place], [place]. PLEASE CALL PATIENT ONCE COMPLETE. THANK YOU!    Does the patient have less than a 3 day supply:  [x] Yes  [] No    Would you like a call back once the refill request has been completed: [x] Yes [] No    If the office needs to give you a call back, can they leave a voicemail: [] Yes [] No    Valery Roberto Rep   07/11/25 08:11 CDT

## 2025-07-11 NOTE — TELEPHONE ENCOUNTER
Reviewed telephone encounter with patient from his request for metoprolol. He states he does not remember picking up the medication from the pharmacy. Advised to contact his pharmacy for any further questions.

## 2025-07-11 NOTE — TELEPHONE ENCOUNTER
"Relay     \"PHARMACY HAS PRESCRIPTION AND IT WAS RECEIVED ON 7/9/25. THE INSURANCE COMPANY SAYS IT IS TOO EARLY FOR A REFILL AS PATIENT RECENTLY PICKED UP A 30 DAY SUPPLY ON 6/20/2025.PLEASE LET PATIENT KNOW HE MAY REQUEST A REFILL AFTER 7/20/2025.THIS REQUEST WILL BE REFUSED AND DID NOT NEED TO BE PENDED\"                "

## 2025-07-16 ENCOUNTER — TELEPHONE (OUTPATIENT)
Dept: CARDIOLOGY | Facility: CLINIC | Age: 65
End: 2025-07-16
Payer: MEDICARE

## 2025-07-16 NOTE — TELEPHONE ENCOUNTER
"Relay   UNABLE TO LVM  \"IF PATIENT IS SYMPTOMATIC WITH AFIB-DIZZINESS, SHORTNESS OF BREATH, ELEVATED HR, CAN'T STAND UP, HE NEEDS TO COME TO THE ER\"    Son has returned call. He reports patient BP 70's/40's. Advised ER.                "

## 2025-08-04 ENCOUNTER — TELEPHONE (OUTPATIENT)
Dept: CARDIOLOGY | Facility: CLINIC | Age: 65
End: 2025-08-04
Payer: MEDICARE

## (undated) DEVICE — GLV SURG BIOGEL M LTX PF 7 1/2

## (undated) DEVICE — THE SINGLE USE ETRAP – POLYP TRAP IS USED FOR SUCTION RETRIEVAL OF ENDOSCOPICALLY REMOVED POLYPS.: Brand: ETRAP

## (undated) DEVICE — THE CHANNEL CLEANING BRUSH IS A NYLON FLEXI BRUSH ATTACHED TO A FLEXIBLE PLASTIC SHEATH DESIGNED TO SAFELY REMOVE DEBRIS FROM FLEXIBLE ENDOSCOPES.

## (undated) DEVICE — CUFF,BP,DISP,1 TUBE,ADULT,HP: Brand: MEDLINE

## (undated) DEVICE — FRCP BIOP ENDO CAPTURAPRO SPK SERR 2.8MM 230CM

## (undated) DEVICE — Device: Brand: DEFENDO AIR/WATER/SUCTION AND BIOPSY VALVE

## (undated) DEVICE — BHS TURNOVER CYSTO: Brand: MEDLINE INDUSTRIES, INC.

## (undated) DEVICE — PK CYSTO 30

## (undated) DEVICE — SNAR POLYP SENSATION MICRO OVL 13 240X40

## (undated) DEVICE — SENSR O2 OXIMAX FNGR A/ 18IN NONSTR

## (undated) DEVICE — CONMED SCOPE SAVER BITE BLOCK, 20X27 MM: Brand: SCOPE SAVER

## (undated) DEVICE — MASK,OXYGEN,MED CONC,ADLT,7' TUB, UC: Brand: PENDING

## (undated) DEVICE — YANKAUER,BULB TIP WITH VENT: Brand: ARGYLE

## (undated) DEVICE — TBG SMPL FLTR LINE NASL 02/C02 A/ BX/100